# Patient Record
Sex: FEMALE | Race: BLACK OR AFRICAN AMERICAN | NOT HISPANIC OR LATINO | Employment: OTHER | URBAN - METROPOLITAN AREA
[De-identification: names, ages, dates, MRNs, and addresses within clinical notes are randomized per-mention and may not be internally consistent; named-entity substitution may affect disease eponyms.]

---

## 2017-01-17 ENCOUNTER — HISTORICAL (OUTPATIENT)
Dept: RADIOLOGY | Facility: HOSPITAL | Age: 62
End: 2017-01-17

## 2017-07-05 ENCOUNTER — HISTORICAL (OUTPATIENT)
Dept: ADMINISTRATIVE | Facility: HOSPITAL | Age: 62
End: 2017-07-05

## 2017-07-05 LAB
APPEARANCE, UA: CLEAR
BACTERIA #/AREA URNS AUTO: ABNORMAL /[HPF]
BILIRUB UR QL STRIP: NEGATIVE
COLOR UR: YELLOW
GLUCOSE (UA): NORMAL
HGB UR QL STRIP: NEGATIVE
HYALINE CASTS #/AREA URNS LPF: ABNORMAL /[LPF]
KETONES UR QL STRIP: NEGATIVE
LEUKOCYTE ESTERASE UR QL STRIP: 25 LEU/UL
NITRITE UR QL STRIP: NEGATIVE
PH UR STRIP: 5.5 [PH] (ref 4.5–8)
PROT UR QL STRIP: 20 MG/DL
RBC #/AREA URNS AUTO: ABNORMAL /[HPF]
SP GR UR STRIP: 1.01 (ref 1–1.03)
SQUAMOUS #/AREA URNS LPF: ABNORMAL /[LPF]
UROBILINOGEN UR STRIP-ACNC: NORMAL
WBC #/AREA URNS AUTO: ABNORMAL /HPF

## 2017-07-14 ENCOUNTER — HISTORICAL (OUTPATIENT)
Dept: ADMINISTRATIVE | Facility: HOSPITAL | Age: 62
End: 2017-07-14

## 2017-09-20 ENCOUNTER — HISTORICAL (OUTPATIENT)
Dept: ADMINISTRATIVE | Facility: HOSPITAL | Age: 62
End: 2017-09-20

## 2017-10-19 ENCOUNTER — HISTORICAL (OUTPATIENT)
Dept: ADMINISTRATIVE | Facility: HOSPITAL | Age: 62
End: 2017-10-19

## 2018-11-26 ENCOUNTER — HISTORICAL (OUTPATIENT)
Dept: RADIOLOGY | Facility: HOSPITAL | Age: 63
End: 2018-11-26

## 2018-12-06 ENCOUNTER — HISTORICAL (OUTPATIENT)
Dept: ADMINISTRATIVE | Facility: HOSPITAL | Age: 63
End: 2018-12-06

## 2019-01-09 ENCOUNTER — HISTORICAL (OUTPATIENT)
Dept: RADIOLOGY | Facility: HOSPITAL | Age: 64
End: 2019-01-09

## 2019-02-12 ENCOUNTER — HISTORICAL (OUTPATIENT)
Dept: ADMINISTRATIVE | Facility: HOSPITAL | Age: 64
End: 2019-02-12

## 2019-03-04 ENCOUNTER — HISTORICAL (OUTPATIENT)
Dept: ADMINISTRATIVE | Facility: HOSPITAL | Age: 64
End: 2019-03-04

## 2019-03-04 LAB
T3FREE SERPL-MCNC: 2.68 PG/ML (ref 2.18–3.98)
T4 FREE SERPL-MCNC: 1.23 NG/DL (ref 0.76–1.46)
TSH SERPL-ACNC: 0.82 MIU/L (ref 0.36–3.74)

## 2019-03-07 ENCOUNTER — HISTORICAL (OUTPATIENT)
Dept: ADMINISTRATIVE | Facility: HOSPITAL | Age: 64
End: 2019-03-07

## 2019-03-28 ENCOUNTER — HISTORICAL (OUTPATIENT)
Dept: LAB | Facility: HOSPITAL | Age: 64
End: 2019-03-28

## 2019-03-28 LAB
ALBUMIN SERPL-MCNC: 3.6 GM/DL (ref 3.4–5)
ALBUMIN/GLOB SERPL: 1 RATIO (ref 1.1–2)
ALP SERPL-CCNC: 96 UNIT/L (ref 45–117)
ALT SERPL-CCNC: 17 UNIT/L (ref 12–78)
AST SERPL-CCNC: 16 UNIT/L (ref 15–37)
BILIRUB SERPL-MCNC: 0.6 MG/DL (ref 0.2–1)
BILIRUBIN DIRECT+TOT PNL SERPL-MCNC: 0.2 MG/DL
BILIRUBIN DIRECT+TOT PNL SERPL-MCNC: 0.4 MG/DL
BUN SERPL-MCNC: 20 MG/DL (ref 7–18)
CALCIUM SERPL-MCNC: 8.9 MG/DL (ref 8.5–10.1)
CHLORIDE SERPL-SCNC: 110 MMOL/L (ref 98–107)
CHOLEST SERPL-MCNC: 176 MG/DL
CHOLEST/HDLC SERPL: 2.3 {RATIO} (ref 0–4.4)
CO2 SERPL-SCNC: 28 MMOL/L (ref 21–32)
CREAT SERPL-MCNC: 1.1 MG/DL (ref 0.6–1.3)
DEPRECATED CALCIDIOL+CALCIFEROL SERPL-MC: 11.18 NG/ML (ref 30–80)
ERYTHROCYTE [DISTWIDTH] IN BLOOD BY AUTOMATED COUNT: 12.2 % (ref 11.5–14.5)
FT4I SERPL CALC-MCNC: 4.78 (ref 2.6–3.6)
GLOBULIN SER-MCNC: 3.6 GM/ML (ref 2.3–3.5)
GLUCOSE SERPL-MCNC: 80 MG/DL (ref 74–106)
HCT VFR BLD AUTO: 38.3 % (ref 35–46)
HDLC SERPL-MCNC: 78 MG/DL
HGB BLD-MCNC: 12.7 GM/DL (ref 12–16)
LDLC SERPL CALC-MCNC: 78 MG/DL (ref 0–130)
MCH RBC QN AUTO: 29.3 PG (ref 26–34)
MCHC RBC AUTO-ENTMCNC: 33.2 GM/DL (ref 31–37)
MCV RBC AUTO: 88.5 FL (ref 80–100)
PLATELET # BLD AUTO: 177 X10(3)/MCL (ref 130–400)
PMV BLD AUTO: 10.4 FL (ref 7.4–10.4)
POTASSIUM SERPL-SCNC: 4.2 MMOL/L (ref 3.5–5.1)
PROT SERPL-MCNC: 7.2 GM/DL (ref 6.4–8.2)
RBC # BLD AUTO: 4.33 X10(6)/MCL (ref 4–5.2)
SODIUM SERPL-SCNC: 143 MMOL/L (ref 136–145)
T3RU NFR SERPL: 29 % (ref 31–39)
T4 SERPL-MCNC: 16.5 MCG/DL (ref 4.7–13.3)
TRIGL SERPL-MCNC: 102 MG/DL
TSH SERPL-ACNC: 0.43 MIU/L (ref 0.36–3.74)
VLDLC SERPL CALC-MCNC: 20 MG/DL
WBC # SPEC AUTO: 5.1 X10(3)/MCL (ref 4.5–11)

## 2019-07-09 ENCOUNTER — HISTORICAL (OUTPATIENT)
Dept: FAMILY MEDICINE | Facility: CLINIC | Age: 64
End: 2019-07-09

## 2019-07-09 LAB
ABS NEUT (OLG): 2.83 X10(3)/MCL (ref 2.1–9.2)
BASOPHILS # BLD AUTO: 0.05 X10(3)/MCL
BASOPHILS NFR BLD AUTO: 1 %
CHOLEST SERPL-MCNC: 175 MG/DL
CHOLEST/HDLC SERPL: 2.3 {RATIO} (ref 0–4.4)
DEPRECATED CALCIDIOL+CALCIFEROL SERPL-MC: 17.8 NG/ML (ref 30–80)
EOSINOPHIL # BLD AUTO: 0.26 X10(3)/MCL
EOSINOPHIL NFR BLD AUTO: 5 %
ERYTHROCYTE [DISTWIDTH] IN BLOOD BY AUTOMATED COUNT: 13 % (ref 11.5–14.5)
HCT VFR BLD AUTO: 37.6 % (ref 35–46)
HDLC SERPL-MCNC: 75 MG/DL
HGB BLD-MCNC: 12 GM/DL (ref 12–16)
IMM GRANULOCYTES # BLD AUTO: 0.01 10*3/UL
IMM GRANULOCYTES NFR BLD AUTO: 0 %
LDLC SERPL CALC-MCNC: 72 MG/DL (ref 0–130)
LYMPHOCYTES # BLD AUTO: 1.61 X10(3)/MCL
LYMPHOCYTES NFR BLD AUTO: 31 % (ref 13–40)
MCH RBC QN AUTO: 29 PG (ref 26–34)
MCHC RBC AUTO-ENTMCNC: 31.9 GM/DL (ref 31–37)
MCV RBC AUTO: 90.8 FL (ref 80–100)
MONOCYTES # BLD AUTO: 0.4 X10(3)/MCL
MONOCYTES NFR BLD AUTO: 8 % (ref 4–12)
NEUTROPHILS # BLD AUTO: 2.83 X10(3)/MCL
NEUTROPHILS NFR BLD AUTO: 55 X10(3)/MCL
PLATELET # BLD AUTO: 179 X10(3)/MCL (ref 130–400)
PMV BLD AUTO: 10.4 FL (ref 7.4–10.4)
RBC # BLD AUTO: 4.14 X10(6)/MCL (ref 4–5.2)
TRIGL SERPL-MCNC: 138 MG/DL
TSH SERPL-ACNC: 0.74 MIU/L (ref 0.36–3.74)
VLDLC SERPL CALC-MCNC: 28 MG/DL
WBC # SPEC AUTO: 5.2 X10(3)/MCL (ref 4.5–11)

## 2019-10-11 ENCOUNTER — HISTORICAL (OUTPATIENT)
Dept: ADMINISTRATIVE | Facility: HOSPITAL | Age: 64
End: 2019-10-11

## 2019-10-11 LAB
ABS NEUT (OLG): 3.56 X10(3)/MCL (ref 2.1–9.2)
ALBUMIN SERPL-MCNC: 3.7 GM/DL (ref 3.4–5)
ALBUMIN/GLOB SERPL: 1.1 RATIO (ref 1.1–2)
ALP SERPL-CCNC: 120 UNIT/L (ref 45–117)
ALT SERPL-CCNC: 23 UNIT/L (ref 12–78)
AST SERPL-CCNC: 20 UNIT/L (ref 15–37)
BASOPHILS # BLD AUTO: 0 X10(3)/MCL (ref 0–0.2)
BASOPHILS NFR BLD AUTO: 1 %
BILIRUB SERPL-MCNC: 0.5 MG/DL (ref 0.2–1)
BILIRUBIN DIRECT+TOT PNL SERPL-MCNC: 0.2 MG/DL (ref 0–0.2)
BILIRUBIN DIRECT+TOT PNL SERPL-MCNC: 0.3 MG/DL
BUN SERPL-MCNC: 17 MG/DL (ref 7–18)
CALCIUM SERPL-MCNC: 8.9 MG/DL (ref 8.5–10.1)
CHLORIDE SERPL-SCNC: 107 MMOL/L (ref 98–107)
CHOLEST SERPL-MCNC: 168 MG/DL
CHOLEST/HDLC SERPL: 2 {RATIO} (ref 0–4.4)
CO2 SERPL-SCNC: 31 MMOL/L (ref 21–32)
CREAT SERPL-MCNC: 1.1 MG/DL (ref 0.6–1.3)
DEPRECATED CALCIDIOL+CALCIFEROL SERPL-MC: 17 NG/ML (ref 30–80)
EOSINOPHIL # BLD AUTO: 0.3 X10(3)/MCL (ref 0–0.9)
EOSINOPHIL NFR BLD AUTO: 5 %
ERYTHROCYTE [DISTWIDTH] IN BLOOD BY AUTOMATED COUNT: 12 % (ref 11.5–14.5)
GLOBULIN SER-MCNC: 3.5 GM/ML (ref 2.3–3.5)
GLUCOSE SERPL-MCNC: 82 MG/DL (ref 74–106)
HCT VFR BLD AUTO: 39.3 % (ref 35–46)
HDLC SERPL-MCNC: 83 MG/DL (ref 40–59)
HGB BLD-MCNC: 12.7 GM/DL (ref 12–16)
IMM GRANULOCYTES # BLD AUTO: 0.01 10*3/UL
IMM GRANULOCYTES NFR BLD AUTO: 0 %
LDLC SERPL CALC-MCNC: 58 MG/DL
LYMPHOCYTES # BLD AUTO: 1.5 X10(3)/MCL (ref 0.6–4.6)
LYMPHOCYTES NFR BLD AUTO: 26 %
MCH RBC QN AUTO: 28.9 PG (ref 26–34)
MCHC RBC AUTO-ENTMCNC: 32.3 GM/DL (ref 31–37)
MCV RBC AUTO: 89.5 FL (ref 80–100)
MONOCYTES # BLD AUTO: 0.4 X10(3)/MCL (ref 0.1–1.3)
MONOCYTES NFR BLD AUTO: 7 %
NEUTROPHILS # BLD AUTO: 3.56 X10(3)/MCL (ref 2.1–9.2)
NEUTROPHILS NFR BLD AUTO: 62 %
PLATELET # BLD AUTO: 183 X10(3)/MCL (ref 130–400)
PMV BLD AUTO: 10.7 FL (ref 7.4–10.4)
POTASSIUM SERPL-SCNC: 4.6 MMOL/L (ref 3.5–5.1)
PROT SERPL-MCNC: 7.2 GM/DL (ref 6.4–8.2)
RBC # BLD AUTO: 4.39 X10(6)/MCL (ref 4–5.2)
SODIUM SERPL-SCNC: 143 MMOL/L (ref 136–145)
TRIGL SERPL-MCNC: 134 MG/DL
VLDLC SERPL CALC-MCNC: 27 MG/DL
WBC # SPEC AUTO: 5.8 X10(3)/MCL (ref 4.5–11)

## 2019-11-11 ENCOUNTER — HISTORICAL (OUTPATIENT)
Dept: RADIOLOGY | Facility: HOSPITAL | Age: 64
End: 2019-11-11

## 2020-06-09 ENCOUNTER — HISTORICAL (OUTPATIENT)
Dept: ADMINISTRATIVE | Facility: HOSPITAL | Age: 65
End: 2020-06-09

## 2020-06-09 LAB
ABS NEUT (OLG): 3.23 X10(3)/MCL (ref 2.1–9.2)
ALBUMIN SERPL-MCNC: 3.8 GM/DL (ref 3.4–5)
ALBUMIN/GLOB SERPL: 1 RATIO (ref 1.1–2)
ALP SERPL-CCNC: 118 UNIT/L (ref 45–117)
ALT SERPL-CCNC: 21 UNIT/L (ref 12–78)
AST SERPL-CCNC: 18 UNIT/L (ref 15–37)
BASOPHILS # BLD AUTO: 0 X10(3)/MCL (ref 0–0.2)
BASOPHILS NFR BLD AUTO: 1 %
BILIRUB SERPL-MCNC: 0.4 MG/DL (ref 0.2–1)
BILIRUBIN DIRECT+TOT PNL SERPL-MCNC: 0.2 MG/DL
BILIRUBIN DIRECT+TOT PNL SERPL-MCNC: 0.2 MG/DL (ref 0–0.2)
BUN SERPL-MCNC: 16 MG/DL (ref 7–18)
CALCIUM SERPL-MCNC: 9.1 MG/DL (ref 8.5–10.1)
CHLORIDE SERPL-SCNC: 107 MMOL/L (ref 98–107)
CHOLEST SERPL-MCNC: 164 MG/DL
CHOLEST/HDLC SERPL: 2.1 {RATIO} (ref 0–4.4)
CO2 SERPL-SCNC: 31 MMOL/L (ref 21–32)
CREAT SERPL-MCNC: 1.1 MG/DL (ref 0.6–1.3)
DEPRECATED CALCIDIOL+CALCIFEROL SERPL-MC: 14.7 NG/ML (ref 30–80)
EOSINOPHIL # BLD AUTO: 0.3 X10(3)/MCL (ref 0–0.9)
EOSINOPHIL NFR BLD AUTO: 5 %
ERYTHROCYTE [DISTWIDTH] IN BLOOD BY AUTOMATED COUNT: 12.6 % (ref 11.5–14.5)
GLOBULIN SER-MCNC: 3.9 GM/ML (ref 2.3–3.5)
GLUCOSE SERPL-MCNC: 82 MG/DL (ref 74–106)
HCT VFR BLD AUTO: 38.7 % (ref 35–46)
HDLC SERPL-MCNC: 77 MG/DL (ref 40–59)
HGB BLD-MCNC: 12.4 GM/DL (ref 12–16)
IMM GRANULOCYTES # BLD AUTO: 0.01 10*3/UL
IMM GRANULOCYTES NFR BLD AUTO: 0 %
LDLC SERPL CALC-MCNC: 63 MG/DL
LYMPHOCYTES # BLD AUTO: 1.7 X10(3)/MCL (ref 0.6–4.6)
LYMPHOCYTES NFR BLD AUTO: 30 %
MCH RBC QN AUTO: 29.1 PG (ref 26–34)
MCHC RBC AUTO-ENTMCNC: 32 GM/DL (ref 31–37)
MCV RBC AUTO: 90.8 FL (ref 80–100)
MONOCYTES # BLD AUTO: 0.4 X10(3)/MCL (ref 0.1–1.3)
MONOCYTES NFR BLD AUTO: 7 %
NEUTROPHILS # BLD AUTO: 3.23 X10(3)/MCL (ref 2.1–9.2)
NEUTROPHILS NFR BLD AUTO: 56 %
PLATELET # BLD AUTO: 200 X10(3)/MCL (ref 130–400)
PMV BLD AUTO: 11.2 FL (ref 7.4–10.4)
POTASSIUM SERPL-SCNC: 4.9 MMOL/L (ref 3.5–5.1)
PROT SERPL-MCNC: 7.7 GM/DL (ref 6.4–8.2)
RBC # BLD AUTO: 4.26 X10(6)/MCL (ref 4–5.2)
SODIUM SERPL-SCNC: 141 MMOL/L (ref 136–145)
TRIGL SERPL-MCNC: 120 MG/DL
VLDLC SERPL CALC-MCNC: 24 MG/DL
WBC # SPEC AUTO: 5.7 X10(3)/MCL (ref 4.5–11)

## 2020-07-21 ENCOUNTER — HISTORICAL (OUTPATIENT)
Dept: RADIOLOGY | Facility: HOSPITAL | Age: 65
End: 2020-07-21

## 2020-08-24 ENCOUNTER — HISTORICAL (OUTPATIENT)
Dept: RADIOLOGY | Facility: HOSPITAL | Age: 65
End: 2020-08-24

## 2020-09-01 ENCOUNTER — HISTORICAL (OUTPATIENT)
Dept: ADMINISTRATIVE | Facility: HOSPITAL | Age: 65
End: 2020-09-01

## 2020-09-01 LAB
APTT PPP: 29 SECOND(S) (ref 23.3–37)
INR PPP: 1 (ref 0.9–1.2)
PROTHROMBIN TIME: 12.8 SECOND(S) (ref 11.9–14.4)

## 2020-09-08 ENCOUNTER — HISTORICAL (OUTPATIENT)
Dept: ADMINISTRATIVE | Facility: HOSPITAL | Age: 65
End: 2020-09-08

## 2020-09-08 LAB
ABS NEUT (OLG): 2.83 X10(3)/MCL (ref 2.1–9.2)
ALBUMIN SERPL-MCNC: 3.7 GM/DL (ref 3.4–5)
ALBUMIN/GLOB SERPL: 0.9 RATIO (ref 1.1–2)
ALP SERPL-CCNC: 113 UNIT/L (ref 45–117)
ALT SERPL-CCNC: 22 UNIT/L (ref 12–78)
AST SERPL-CCNC: 19 UNIT/L (ref 15–37)
BASOPHILS # BLD AUTO: 0.1 X10(3)/MCL (ref 0–0.2)
BASOPHILS NFR BLD AUTO: 1 %
BILIRUB SERPL-MCNC: 0.6 MG/DL (ref 0.2–1)
BILIRUBIN DIRECT+TOT PNL SERPL-MCNC: 0.2 MG/DL (ref 0–0.2)
BILIRUBIN DIRECT+TOT PNL SERPL-MCNC: 0.4 MG/DL
BUN SERPL-MCNC: 14 MG/DL (ref 7–18)
CALCIUM SERPL-MCNC: 8.9 MG/DL (ref 8.5–10.1)
CHLORIDE SERPL-SCNC: 110 MMOL/L (ref 98–107)
CO2 SERPL-SCNC: 24 MMOL/L (ref 21–32)
CREAT SERPL-MCNC: 1.2 MG/DL (ref 0.6–1.3)
EOSINOPHIL # BLD AUTO: 0.5 X10(3)/MCL (ref 0–0.9)
EOSINOPHIL NFR BLD AUTO: 9 %
ERYTHROCYTE [DISTWIDTH] IN BLOOD BY AUTOMATED COUNT: 12.1 % (ref 11.5–14.5)
GLOBULIN SER-MCNC: 4.1 GM/ML (ref 2.3–3.5)
GLUCOSE SERPL-MCNC: 100 MG/DL (ref 74–106)
HCT VFR BLD AUTO: 39.5 % (ref 35–46)
HGB BLD-MCNC: 12.6 GM/DL (ref 12–16)
IMM GRANULOCYTES # BLD AUTO: 0.01 10*3/UL
IMM GRANULOCYTES NFR BLD AUTO: 0 %
LYMPHOCYTES # BLD AUTO: 1.5 X10(3)/MCL (ref 0.6–4.6)
LYMPHOCYTES NFR BLD AUTO: 28 %
MCH RBC QN AUTO: 28.6 PG (ref 26–34)
MCHC RBC AUTO-ENTMCNC: 31.9 GM/DL (ref 31–37)
MCV RBC AUTO: 89.6 FL (ref 80–100)
MONOCYTES # BLD AUTO: 0.3 X10(3)/MCL (ref 0.1–1.3)
MONOCYTES NFR BLD AUTO: 6 %
NEUTROPHILS # BLD AUTO: 2.83 X10(3)/MCL (ref 2.1–9.2)
NEUTROPHILS NFR BLD AUTO: 55 %
PLATELET # BLD AUTO: 178 X10(3)/MCL (ref 130–400)
PMV BLD AUTO: 10.7 FL (ref 7.4–10.4)
POTASSIUM SERPL-SCNC: 4.1 MMOL/L (ref 3.5–5.1)
PROT SERPL-MCNC: 7.8 GM/DL (ref 6.4–8.2)
RBC # BLD AUTO: 4.41 X10(6)/MCL (ref 4–5.2)
SODIUM SERPL-SCNC: 143 MMOL/L (ref 136–145)
T4 FREE SERPL-MCNC: 1.22 NG/DL (ref 0.76–1.46)
TSH SERPL-ACNC: 1.29 MIU/L (ref 0.36–3.74)
WBC # SPEC AUTO: 5.1 X10(3)/MCL (ref 4.5–11)

## 2020-10-15 ENCOUNTER — HISTORICAL (OUTPATIENT)
Dept: RADIOLOGY | Facility: HOSPITAL | Age: 65
End: 2020-10-15

## 2020-10-15 LAB
ABS NEUT (OLG): 2.93 X10(3)/MCL (ref 2.1–9.2)
ALBUMIN SERPL-MCNC: 3.6 GM/DL (ref 3.4–5)
ALBUMIN/GLOB SERPL: 1 RATIO (ref 1.1–2)
ALP SERPL-CCNC: 128 UNIT/L (ref 45–117)
ALT SERPL-CCNC: 20 UNIT/L (ref 12–78)
AST SERPL-CCNC: 14 UNIT/L (ref 15–37)
BASOPHILS # BLD AUTO: 0 X10(3)/MCL (ref 0–0.2)
BASOPHILS NFR BLD AUTO: 1 %
BILIRUB SERPL-MCNC: 0.5 MG/DL (ref 0.2–1)
BILIRUBIN DIRECT+TOT PNL SERPL-MCNC: 0.2 MG/DL (ref 0–0.2)
BILIRUBIN DIRECT+TOT PNL SERPL-MCNC: 0.3 MG/DL
BUN SERPL-MCNC: 26 MG/DL (ref 7–18)
CALCIUM SERPL-MCNC: 8.8 MG/DL (ref 8.5–10.1)
CHLORIDE SERPL-SCNC: 109 MMOL/L (ref 98–107)
CO2 SERPL-SCNC: 28 MMOL/L (ref 21–32)
CREAT SERPL-MCNC: 1.1 MG/DL (ref 0.6–1.3)
EOSINOPHIL # BLD AUTO: 0.4 X10(3)/MCL (ref 0–0.9)
EOSINOPHIL NFR BLD AUTO: 7 %
ERYTHROCYTE [DISTWIDTH] IN BLOOD BY AUTOMATED COUNT: 12.6 % (ref 11.5–14.5)
FERRITIN SERPL-MCNC: 103.7 NG/ML (ref 8–388)
GLOBULIN SER-MCNC: 3.7 GM/ML (ref 2.3–3.5)
GLUCOSE SERPL-MCNC: 87 MG/DL (ref 74–106)
HAV AB SER QL IA: NONREACTIVE
HAV IGM SERPL QL IA: NONREACTIVE
HBV CORE AB SERPL QL IA: NONREACTIVE
HBV SURFACE AB SER-ACNC: 0 M[IU]/ML
HBV SURFACE AB SERPL IA-ACNC: NONREACTIVE M[IU]/ML
HBV SURFACE AG SERPL QL IA: NONREACTIVE
HCT VFR BLD AUTO: 39.1 % (ref 35–46)
HGB BLD-MCNC: 12.4 GM/DL (ref 12–16)
HIV 1+2 AB+HIV1 P24 AG SERPL QL IA: NONREACTIVE
IMM GRANULOCYTES # BLD AUTO: 0.01 10*3/UL
IMM GRANULOCYTES NFR BLD AUTO: 0 %
INR PPP: 0.97 (ref 0.9–1.2)
LYMPHOCYTES # BLD AUTO: 1.5 X10(3)/MCL (ref 0.6–4.6)
LYMPHOCYTES NFR BLD AUTO: 29 %
MCH RBC QN AUTO: 28.6 PG (ref 26–34)
MCHC RBC AUTO-ENTMCNC: 31.7 GM/DL (ref 31–37)
MCV RBC AUTO: 90.1 FL (ref 80–100)
MONOCYTES # BLD AUTO: 0.4 X10(3)/MCL (ref 0.1–1.3)
MONOCYTES NFR BLD AUTO: 7 %
NEUTROPHILS # BLD AUTO: 2.93 X10(3)/MCL (ref 2.1–9.2)
NEUTROPHILS NFR BLD AUTO: 56 %
PLATELET # BLD AUTO: 167 X10(3)/MCL (ref 130–400)
PMV BLD AUTO: 10.8 FL (ref 7.4–10.4)
POTASSIUM SERPL-SCNC: 4.5 MMOL/L (ref 3.5–5.1)
PROT SERPL-MCNC: 7.3 GM/DL (ref 6.4–8.2)
PROTHROMBIN TIME: 12.5 SECOND(S) (ref 11.9–14.4)
RBC # BLD AUTO: 4.34 X10(6)/MCL (ref 4–5.2)
SODIUM SERPL-SCNC: 143 MMOL/L (ref 136–145)
T PALLIDUM AB SER QL: NONREACTIVE
WBC # SPEC AUTO: 5.2 X10(3)/MCL (ref 4.5–11)

## 2020-10-27 ENCOUNTER — HISTORICAL (OUTPATIENT)
Dept: ADMINISTRATIVE | Facility: HOSPITAL | Age: 65
End: 2020-10-27

## 2020-10-27 LAB
CRP SERPL-MCNC: <0.1 MG/DL
ERYTHROCYTE [SEDIMENTATION RATE] IN BLOOD: 6 MM/HR (ref 0–20)
RHEUMATOID FACT SERPL-ACNC: <13 IU/ML
SSA(RO) ANTIBODY (OHS): NEGATIVE

## 2021-01-05 ENCOUNTER — HISTORICAL (OUTPATIENT)
Dept: ADMINISTRATIVE | Facility: HOSPITAL | Age: 66
End: 2021-01-05

## 2021-01-05 LAB
ABS NEUT (OLG): 3.19 X10(3)/MCL (ref 2.1–9.2)
ALBUMIN SERPL-MCNC: 4.2 GM/DL (ref 3.4–4.8)
ALBUMIN/GLOB SERPL: 1.2 RATIO (ref 1.1–2)
ALP SERPL-CCNC: 80 UNIT/L (ref 40–150)
ALT SERPL-CCNC: 11 UNIT/L (ref 0–55)
AST SERPL-CCNC: 14 UNIT/L (ref 5–34)
BASOPHILS # BLD AUTO: 0.1 X10(3)/MCL (ref 0–0.2)
BASOPHILS NFR BLD AUTO: 1 %
BILIRUB SERPL-MCNC: 0.7 MG/DL
BILIRUBIN DIRECT+TOT PNL SERPL-MCNC: 0.3 MG/DL (ref 0–0.5)
BILIRUBIN DIRECT+TOT PNL SERPL-MCNC: 0.4 MG/DL (ref 0–0.8)
BUN SERPL-MCNC: 20 MG/DL (ref 9.8–20.1)
CALCIUM SERPL-MCNC: 9.2 MG/DL (ref 8.4–10.2)
CHLORIDE SERPL-SCNC: 106 MMOL/L (ref 98–107)
CO2 SERPL-SCNC: 27 MMOL/L (ref 23–31)
CREAT SERPL-MCNC: 1.48 MG/DL (ref 0.55–1.02)
EOSINOPHIL # BLD AUTO: 0.4 X10(3)/MCL (ref 0–0.9)
EOSINOPHIL NFR BLD AUTO: 6 %
ERYTHROCYTE [DISTWIDTH] IN BLOOD BY AUTOMATED COUNT: 12.3 % (ref 11.5–14.5)
GLOBULIN SER-MCNC: 3.5 GM/DL (ref 2.4–3.5)
GLUCOSE SERPL-MCNC: 107 MG/DL (ref 82–115)
HCT VFR BLD AUTO: 38.7 % (ref 35–46)
HGB BLD-MCNC: 12.9 GM/DL (ref 12–16)
IMM GRANULOCYTES # BLD AUTO: 0.02 10*3/UL
IMM GRANULOCYTES NFR BLD AUTO: 0 %
LYMPHOCYTES # BLD AUTO: 2.1 X10(3)/MCL (ref 0.6–4.6)
LYMPHOCYTES NFR BLD AUTO: 34 %
MCH RBC QN AUTO: 29.6 PG (ref 26–34)
MCHC RBC AUTO-ENTMCNC: 33.3 GM/DL (ref 31–37)
MCV RBC AUTO: 88.8 FL (ref 80–100)
MONOCYTES # BLD AUTO: 0.4 X10(3)/MCL (ref 0.1–1.3)
MONOCYTES NFR BLD AUTO: 6 %
NEUTROPHILS # BLD AUTO: 3.19 X10(3)/MCL (ref 2.1–9.2)
NEUTROPHILS NFR BLD AUTO: 52 %
PLATELET # BLD AUTO: 180 X10(3)/MCL (ref 130–400)
PMV BLD AUTO: 10.4 FL (ref 7.4–10.4)
POTASSIUM SERPL-SCNC: 5.1 MMOL/L (ref 3.5–5.1)
PROT SERPL-MCNC: 7.7 GM/DL (ref 5.8–7.6)
RBC # BLD AUTO: 4.36 X10(6)/MCL (ref 4–5.2)
SODIUM SERPL-SCNC: 142 MMOL/L (ref 136–145)
WBC # SPEC AUTO: 6.2 X10(3)/MCL (ref 4.5–11)

## 2021-01-19 ENCOUNTER — HISTORICAL (OUTPATIENT)
Dept: FAMILY MEDICINE | Facility: CLINIC | Age: 66
End: 2021-01-19

## 2021-01-19 LAB
ABS NEUT (OLG): 2.97 X10(3)/MCL (ref 2.1–9.2)
ALBUMIN SERPL-MCNC: 4 GM/DL (ref 3.4–4.8)
ALBUMIN/GLOB SERPL: 1.2 RATIO (ref 1.1–2)
ALP SERPL-CCNC: 68 UNIT/L (ref 40–150)
ALT SERPL-CCNC: 9 UNIT/L (ref 0–55)
AST SERPL-CCNC: 14 UNIT/L (ref 5–34)
BASOPHILS # BLD AUTO: 0 X10(3)/MCL (ref 0–0.2)
BASOPHILS NFR BLD AUTO: 1 %
BILIRUB SERPL-MCNC: 0.6 MG/DL
BILIRUBIN DIRECT+TOT PNL SERPL-MCNC: 0.2 MG/DL (ref 0–0.5)
BILIRUBIN DIRECT+TOT PNL SERPL-MCNC: 0.4 MG/DL (ref 0–0.8)
BUN SERPL-MCNC: 12 MG/DL (ref 9.8–20.1)
CALCIUM SERPL-MCNC: 9.1 MG/DL (ref 8.4–10.2)
CHLORIDE SERPL-SCNC: 107 MMOL/L (ref 98–107)
CHOLEST SERPL-MCNC: 167 MG/DL
CHOLEST/HDLC SERPL: 4 {RATIO} (ref 0–5)
CO2 SERPL-SCNC: 25 MMOL/L (ref 23–31)
CREAT SERPL-MCNC: 1.2 MG/DL (ref 0.55–1.02)
EOSINOPHIL # BLD AUTO: 0.4 X10(3)/MCL (ref 0–0.9)
EOSINOPHIL NFR BLD AUTO: 6 %
ERYTHROCYTE [DISTWIDTH] IN BLOOD BY AUTOMATED COUNT: 12.1 % (ref 11.5–14.5)
EST. AVERAGE GLUCOSE BLD GHB EST-MCNC: 119.8 MG/DL
GLOBULIN SER-MCNC: 3.2 GM/DL (ref 2.4–3.5)
GLUCOSE SERPL-MCNC: 92 MG/DL (ref 82–115)
HBA1C MFR BLD: 5.8 %
HCT VFR BLD AUTO: 36.8 % (ref 35–46)
HDLC SERPL-MCNC: 42 MG/DL (ref 35–60)
HGB BLD-MCNC: 12 GM/DL (ref 12–16)
LDLC SERPL CALC-MCNC: 98 MG/DL (ref 50–140)
LYMPHOCYTES # BLD AUTO: 1.9 X10(3)/MCL (ref 0.6–4.6)
LYMPHOCYTES NFR BLD AUTO: 34 %
MCH RBC QN AUTO: 28.9 PG (ref 26–34)
MCHC RBC AUTO-ENTMCNC: 32.6 GM/DL (ref 31–37)
MCV RBC AUTO: 88.7 FL (ref 80–100)
MONOCYTES # BLD AUTO: 0.3 X10(3)/MCL (ref 0.1–1.3)
MONOCYTES NFR BLD AUTO: 6 %
NEUTROPHILS # BLD AUTO: 2.97 X10(3)/MCL (ref 2.1–9.2)
NEUTROPHILS NFR BLD AUTO: 53 %
PLATELET # BLD AUTO: 156 X10(3)/MCL (ref 130–400)
PMV BLD AUTO: 11 FL (ref 7.4–10.4)
POTASSIUM SERPL-SCNC: 4.6 MMOL/L (ref 3.5–5.1)
PROT SERPL-MCNC: 7.2 GM/DL (ref 5.8–7.6)
RBC # BLD AUTO: 4.15 X10(6)/MCL (ref 4–5.2)
SODIUM SERPL-SCNC: 142 MMOL/L (ref 136–145)
T4 FREE SERPL-MCNC: 0.97 NG/DL (ref 0.7–1.48)
TRIGL SERPL-MCNC: 137 MG/DL (ref 37–140)
TSH SERPL-ACNC: 0.94 UIU/ML (ref 0.35–4.94)
VLDLC SERPL CALC-MCNC: 27 MG/DL
WBC # SPEC AUTO: 5.6 X10(3)/MCL (ref 4.5–11)

## 2021-02-02 ENCOUNTER — HISTORICAL (OUTPATIENT)
Dept: ADMINISTRATIVE | Facility: HOSPITAL | Age: 66
End: 2021-02-02

## 2021-03-03 ENCOUNTER — HISTORICAL (OUTPATIENT)
Dept: ADMINISTRATIVE | Facility: HOSPITAL | Age: 66
End: 2021-03-03

## 2021-03-03 LAB
ABS NEUT (OLG): 2.88 X10(3)/MCL (ref 2.1–9.2)
ALBUMIN SERPL-MCNC: 3.9 GM/DL (ref 3.4–4.8)
ALBUMIN/GLOB SERPL: 1.3 RATIO (ref 1.1–2)
ALP SERPL-CCNC: 80 UNIT/L (ref 40–150)
ALT SERPL-CCNC: 8 UNIT/L (ref 0–55)
AST SERPL-CCNC: 13 UNIT/L (ref 5–34)
BASOPHILS # BLD AUTO: 0 X10(3)/MCL (ref 0–0.2)
BASOPHILS NFR BLD AUTO: 1 %
BILIRUB SERPL-MCNC: 1.1 MG/DL
BILIRUBIN DIRECT+TOT PNL SERPL-MCNC: 0.3 MG/DL (ref 0–0.5)
BILIRUBIN DIRECT+TOT PNL SERPL-MCNC: 0.8 MG/DL (ref 0–0.8)
BUN SERPL-MCNC: 21 MG/DL (ref 9.8–20.1)
CALCIUM SERPL-MCNC: 9 MG/DL (ref 8.4–10.2)
CHLORIDE SERPL-SCNC: 105 MMOL/L (ref 98–107)
CO2 SERPL-SCNC: 25 MMOL/L (ref 23–31)
CREAT SERPL-MCNC: 1.11 MG/DL (ref 0.55–1.02)
EOSINOPHIL # BLD AUTO: 0.3 X10(3)/MCL (ref 0–0.9)
EOSINOPHIL NFR BLD AUTO: 5 %
ERYTHROCYTE [DISTWIDTH] IN BLOOD BY AUTOMATED COUNT: 13 % (ref 11.5–14.5)
GLOBULIN SER-MCNC: 3 GM/DL (ref 2.4–3.5)
GLUCOSE SERPL-MCNC: 80 MG/DL (ref 82–115)
HCT VFR BLD AUTO: 36.4 % (ref 35–46)
HGB BLD-MCNC: 11.8 GM/DL (ref 12–16)
IMM GRANULOCYTES # BLD AUTO: 0.01 10*3/UL
IMM GRANULOCYTES NFR BLD AUTO: 0 %
LYMPHOCYTES # BLD AUTO: 1.7 X10(3)/MCL (ref 0.6–4.6)
LYMPHOCYTES NFR BLD AUTO: 32 %
MCH RBC QN AUTO: 28.8 PG (ref 26–34)
MCHC RBC AUTO-ENTMCNC: 32.4 GM/DL (ref 31–37)
MCV RBC AUTO: 88.8 FL (ref 80–100)
MONOCYTES # BLD AUTO: 0.4 X10(3)/MCL (ref 0.1–1.3)
MONOCYTES NFR BLD AUTO: 7 %
NEUTROPHILS # BLD AUTO: 2.88 X10(3)/MCL (ref 2.1–9.2)
NEUTROPHILS NFR BLD AUTO: 55 %
PLATELET # BLD AUTO: 171 X10(3)/MCL (ref 130–400)
PMV BLD AUTO: 10.2 FL (ref 7.4–10.4)
POTASSIUM SERPL-SCNC: 5 MMOL/L (ref 3.5–5.1)
PROT SERPL-MCNC: 6.9 GM/DL (ref 5.8–7.6)
RBC # BLD AUTO: 4.1 X10(6)/MCL (ref 4–5.2)
SODIUM SERPL-SCNC: 140 MMOL/L (ref 136–145)
WBC # SPEC AUTO: 5.3 X10(3)/MCL (ref 4.5–11)

## 2021-03-16 ENCOUNTER — HISTORICAL (OUTPATIENT)
Dept: ADMINISTRATIVE | Facility: HOSPITAL | Age: 66
End: 2021-03-16

## 2021-03-30 ENCOUNTER — HISTORICAL (OUTPATIENT)
Dept: ADMINISTRATIVE | Facility: HOSPITAL | Age: 66
End: 2021-03-30

## 2021-04-12 ENCOUNTER — HISTORICAL (OUTPATIENT)
Dept: RADIOLOGY | Facility: HOSPITAL | Age: 66
End: 2021-04-12

## 2021-06-10 ENCOUNTER — HISTORICAL (OUTPATIENT)
Dept: ADMINISTRATIVE | Facility: HOSPITAL | Age: 66
End: 2021-06-10

## 2021-10-27 ENCOUNTER — HISTORICAL (OUTPATIENT)
Dept: FAMILY MEDICINE | Facility: CLINIC | Age: 66
End: 2021-10-27

## 2021-10-27 LAB
ABS NEUT (OLG): 3.33 X10(3)/MCL (ref 2.1–9.2)
ALBUMIN SERPL-MCNC: 3.9 GM/DL (ref 3.4–4.8)
ALBUMIN/GLOB SERPL: 1.2 RATIO (ref 1.1–2)
ALP SERPL-CCNC: 85 UNIT/L (ref 40–150)
ALT SERPL-CCNC: 7 UNIT/L (ref 0–55)
AST SERPL-CCNC: 11 UNIT/L (ref 5–34)
BASOPHILS # BLD AUTO: 0 X10(3)/MCL (ref 0–0.2)
BASOPHILS NFR BLD AUTO: 1 %
BILIRUB SERPL-MCNC: 0.7 MG/DL
BILIRUBIN DIRECT+TOT PNL SERPL-MCNC: 0.2 MG/DL (ref 0–0.5)
BILIRUBIN DIRECT+TOT PNL SERPL-MCNC: 0.5 MG/DL (ref 0–0.8)
BUN SERPL-MCNC: 12 MG/DL (ref 9.8–20.1)
CALCIUM SERPL-MCNC: 9.5 MG/DL (ref 8.7–10.5)
CHLORIDE SERPL-SCNC: 110 MMOL/L (ref 98–107)
CHOLEST SERPL-MCNC: 200 MG/DL
CHOLEST/HDLC SERPL: 4 {RATIO} (ref 0–5)
CO2 SERPL-SCNC: 28 MMOL/L (ref 23–31)
CREAT SERPL-MCNC: 1.08 MG/DL (ref 0.55–1.02)
EOSINOPHIL # BLD AUTO: 0.2 X10(3)/MCL (ref 0–0.9)
EOSINOPHIL NFR BLD AUTO: 4 %
ERYTHROCYTE [DISTWIDTH] IN BLOOD BY AUTOMATED COUNT: 12.8 % (ref 11.5–14.5)
GLOBULIN SER-MCNC: 3.2 GM/DL (ref 2.4–3.5)
GLUCOSE SERPL-MCNC: 88 MG/DL (ref 82–115)
HCT VFR BLD AUTO: 34.6 % (ref 35–46)
HDLC SERPL-MCNC: 45 MG/DL (ref 35–60)
HGB BLD-MCNC: 11.5 GM/DL (ref 12–16)
IMM GRANULOCYTES # BLD AUTO: 0.01 10*3/UL
IMM GRANULOCYTES NFR BLD AUTO: 0 %
LDLC SERPL CALC-MCNC: 130 MG/DL (ref 50–140)
LYMPHOCYTES # BLD AUTO: 1.6 X10(3)/MCL (ref 0.6–4.6)
LYMPHOCYTES NFR BLD AUTO: 29 %
MCH RBC QN AUTO: 28.9 PG (ref 26–34)
MCHC RBC AUTO-ENTMCNC: 33.2 GM/DL (ref 31–37)
MCV RBC AUTO: 86.9 FL (ref 80–100)
MONOCYTES # BLD AUTO: 0.4 X10(3)/MCL (ref 0.1–1.3)
MONOCYTES NFR BLD AUTO: 6 %
NEUTROPHILS # BLD AUTO: 3.33 X10(3)/MCL (ref 2.1–9.2)
NEUTROPHILS NFR BLD AUTO: 59 %
NRBC BLD AUTO-RTO: 0 % (ref 0–0.2)
PLATELET # BLD AUTO: 178 X10(3)/MCL (ref 130–400)
PMV BLD AUTO: 10.7 FL (ref 7.4–10.4)
POTASSIUM SERPL-SCNC: 4.1 MMOL/L (ref 3.5–5.1)
PROT SERPL-MCNC: 7.1 GM/DL (ref 5.8–7.6)
RBC # BLD AUTO: 3.98 X10(6)/MCL (ref 4–5.2)
SODIUM SERPL-SCNC: 143 MMOL/L (ref 136–145)
T4 FREE SERPL-MCNC: 0.99 NG/DL (ref 0.7–1.48)
TRIGL SERPL-MCNC: 127 MG/DL (ref 37–140)
TSH SERPL-ACNC: 0.78 UIU/ML (ref 0.35–4.94)
VLDLC SERPL CALC-MCNC: 25 MG/DL
WBC # SPEC AUTO: 5.6 X10(3)/MCL (ref 4.5–11)

## 2022-01-31 ENCOUNTER — HISTORICAL (OUTPATIENT)
Dept: ADMINISTRATIVE | Facility: HOSPITAL | Age: 67
End: 2022-01-31

## 2022-01-31 ENCOUNTER — HISTORICAL (OUTPATIENT)
Dept: RADIOLOGY | Facility: HOSPITAL | Age: 67
End: 2022-01-31

## 2022-04-10 ENCOUNTER — HISTORICAL (OUTPATIENT)
Dept: ADMINISTRATIVE | Facility: HOSPITAL | Age: 67
End: 2022-04-10
Payer: MEDICARE

## 2022-04-19 ENCOUNTER — HISTORICAL (OUTPATIENT)
Dept: FAMILY MEDICINE | Facility: CLINIC | Age: 67
End: 2022-04-19
Payer: MEDICARE

## 2022-04-19 LAB
ABS NEUT (OLG): 3.05 (ref 2.1–9.2)
ALBUMIN SERPL-MCNC: 4 G/DL (ref 3.4–4.8)
ALBUMIN/GLOB SERPL: 1.1 {RATIO} (ref 1.1–2)
ALP SERPL-CCNC: 100 U/L (ref 40–150)
ALT SERPL-CCNC: 8 U/L (ref 0–55)
APPEARANCE, UA: CLEAR
AST SERPL-CCNC: 12 U/L (ref 5–34)
BACTERIA SPEC CULT: NORMAL
BASOPHILS # BLD AUTO: 0.1 10*3/UL (ref 0–0.2)
BASOPHILS NFR BLD AUTO: 1 %
BILIRUB SERPL-MCNC: 0.6 MG/DL
BILIRUB UR QL STRIP: NEGATIVE
BILIRUBIN DIRECT+TOT PNL SERPL-MCNC: 0.3 (ref 0–0.5)
BILIRUBIN DIRECT+TOT PNL SERPL-MCNC: 0.3 (ref 0–0.8)
BUN SERPL-MCNC: 13.4 MG/DL (ref 9.8–20.1)
CALCIUM SERPL-MCNC: 9.4 MG/DL (ref 8.7–10.5)
CHLORIDE SERPL-SCNC: 107 MMOL/L (ref 98–107)
CHOLEST SERPL-MCNC: 175 MG/DL
CHOLEST/HDLC SERPL: 4 {RATIO} (ref 0–5)
CO2 SERPL-SCNC: 27 MMOL/L (ref 23–31)
COLOR UR: NORMAL
CREAT SERPL-MCNC: 1.01 MG/DL (ref 0.55–1.02)
EOSINOPHIL # BLD AUTO: 0.3 10*3/UL (ref 0–0.9)
EOSINOPHIL NFR BLD AUTO: 5 %
ERYTHROCYTE [DISTWIDTH] IN BLOOD BY AUTOMATED COUNT: 13.1 % (ref 11.5–14.5)
EST. AVERAGE GLUCOSE BLD GHB EST-MCNC: 114 MG/DL
FLAG2 (OHS): 60
FLAG3 (OHS): 80
FLAGS (OHS): 80
GLOBULIN SER-MCNC: 3.5 G/DL (ref 2.4–3.5)
GLUCOSE (UA): NORMAL
GLUCOSE SERPL-MCNC: 78 MG/DL (ref 82–115)
HBA1C MFR BLD: 5.6 %
HCT VFR BLD AUTO: 35.6 % (ref 35–46)
HDLC SERPL-MCNC: 49 MG/DL (ref 35–60)
HEMOLYSIS INTERF INDEX SERPL-ACNC: 1
HGB BLD-MCNC: 11.3 G/DL (ref 12–16)
HGB UR QL STRIP: NORMAL
HYALINE CASTS #/AREA URNS LPF: NORMAL /[LPF]
ICTERIC INTERF INDEX SERPL-ACNC: 1
IMM GRANULOCYTES # BLD AUTO: 0.01 10*3/UL
IMM GRANULOCYTES NFR BLD AUTO: 0 %
IMM. NE 2 SUSPECT FLAG (OHS): 10
KETONES UR QL STRIP: NEGATIVE
LDLC SERPL CALC-MCNC: 103 MG/DL (ref 50–140)
LEUKOCYTE ESTERASE UR QL STRIP: NEGATIVE
LIPEMIC INTERF INDEX SERPL-ACNC: 2
LOW EVENT # SUSPECT FLAG (OHS): 80
LYMPHOCYTES # BLD AUTO: 1.8 10*3/UL (ref 0.6–4.6)
LYMPHOCYTES NFR BLD AUTO: 33 %
MANUAL DIFF? (OHS): NO
MCH RBC QN AUTO: 27.8 PG (ref 26–34)
MCHC RBC AUTO-ENTMCNC: 31.7 G/DL (ref 31–37)
MCV RBC AUTO: 87.7 FL (ref 80–100)
MO BLASTS SUSPECT FLAG (OHS): 40
MONOCYTES # BLD AUTO: 0.3 10*3/UL (ref 0.1–1.3)
MONOCYTES NFR BLD AUTO: 6 %
NEUTROPHILS # BLD AUTO: 3.05 10*3/UL (ref 2.1–9.2)
NEUTROPHILS NFR BLD AUTO: 55 %
NITRITE UR QL STRIP: NEGATIVE
NRBC BLD AUTO-RTO: 0 % (ref 0–0.2)
PH UR STRIP: 5.5 [PH] (ref 4.5–8)
PLATELET # BLD AUTO: 175 10*3/UL (ref 130–400)
PLATELET CLUMPS SUSPECT FLAG (OHS): 10
PMV BLD AUTO: 11.7 FL (ref 7.4–10.4)
POTASSIUM SERPL-SCNC: 4.7 MMOL/L (ref 3.5–5.1)
PROT SERPL-MCNC: 7.5 G/DL (ref 5.8–7.6)
PROT UR QL STRIP: NORMAL
RBC # BLD AUTO: 4.06 10*6/UL (ref 4–5.2)
RBC #/AREA URNS HPF: NORMAL /[HPF] (ref 0–5)
SODIUM SERPL-SCNC: 143 MMOL/L (ref 136–145)
SP GR UR STRIP: 1.02 (ref 1–1.03)
SQUAMOUS EPITHELIAL, UA: NORMAL
TRIGL SERPL-MCNC: 113 MG/DL (ref 37–140)
TSH SERPL-ACNC: 1.27 M[IU]/L (ref 0.35–4.94)
UROBILINOGEN UR STRIP-ACNC: NORMAL
VLDLC SERPL CALC-MCNC: 23 MG/DL
WBC # SPEC AUTO: 5.6 10*3/UL (ref 4.5–11)
WBC #/AREA URNS HPF: NORMAL /[HPF] (ref 0–5)

## 2022-04-26 VITALS
HEIGHT: 71 IN | BODY MASS INDEX: 38.98 KG/M2 | OXYGEN SATURATION: 100 % | WEIGHT: 278.44 LBS | SYSTOLIC BLOOD PRESSURE: 133 MMHG | DIASTOLIC BLOOD PRESSURE: 62 MMHG

## 2022-04-30 NOTE — PROGRESS NOTES
MNT OUTPATIENT EDUCATION   Nutrition Diagnosis  Problem:   Food & Nutrition Related Knowledge Deficit       Related to:  Medical Diagnosis  As Evidenced by:  Limited prior knowledge / health professional referral    Nutrition Prescription / Intervention  MNT Education Completed on:    Healthy Eating:  Instructed patient on heart healthy eating and weight management; low fat, cholesterol, and sodium foods; better food choices; portion sizes; healthy choices when cooking and / or eating out; reading food labels; increasing activity.       Level of Understanding:   good  Expected Compliance:   good  Appropriate Handouts Given: (age specific / literacy): yes  Barriers to Learning: none    Nutrition Prescription:  Diet order prescribed per MD / RD    Goal:  Patient will adhere to prescribed MNT meal plan as instructed by RD    Monitoring / Evaluation    R.DRomeo will monitor: MOE

## 2022-05-02 NOTE — HISTORICAL [facility] CERNER
This is a historical note converted from Rocco. Formatting and pictures may have been removed.  Please reference Rocco for original formatting and attached multimedia. 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 Reviewed and discussed?labs from?9/1/2020 with the patient  ?   Patient states that?he?is adhering to a?low fat and ?low sodium and medications.  ?   HTN/CAD: Pt adheres to low sodium eating habit. Currently on Coreg 12.5 mg po BID and Entresto 24mg/26 mg po daily. Pt denies HA, chest pain, dizziness, SOB, MEYER. ?She states the cardiologist is trying to increase her Entresto?to taking 2 pills daily. ?Patients blood pressure has been consistently stable?within the clinic with Entresto taking 1 tab p.o. daily.? Patient has?hesitant to take the increased dose. ?Patient?requested that?[facility] to contact??[facility] office?in regards to the increase in the Entresto.  ?   Hepatitis C:?Noticed Hep C AB reactive with RNA 2.9 million. Reviewed with pt hepatitis C genotype (9/1/2020)?was 1 a within normal PT/PTT and INR.discussed with patient who was not aware of her Hep C status. She has no risky behavior-no IVDA, no tattoos, no illicit drug use, and no risk sexual behavior. Did have a blood transfusion in 1970s and 1986. with hysterectomy. ?Reviewed liver US on 10/15/2020?was unremarkable. Reviewed ID? note 10/23/2020 stating patient is a candidate for hepatitis C treatment and?hepatitis C?pretreatment work-up detected a positive SRUTHI?which?ID wants?PCP?to follow-up. ?Pt hesitant to start treatment because of concerns about affects of medication on renal function.  ?   CKD: ( 10/15/2020): GFR 65 and BUN/Cr 26.1.10.  ?   Polyarthropathy/ +SRUTHI: Pain in  hands in b/l hands, neck and right foot intermittently. Pt not taking anything for pain. Stabbing pain from lower left buttock to her lower leg. ?Discussed with patient findings from?10/15/2020?pretreatment work-up by?[facility]?ID?in which a positive SRUTHI was found.? Discussed with the patient?that?the SRUTHI was positive?with a speckled 1:160?ratio and with her issues of polyarthropathy,?this should be followed up?to determine?whether or not the patient has an autoimmune arthritis.? Patient verbally expressed understanding and agreement with the?work-up.  ?   Right ear?tinnitus:?Patient has?a referral to? [facility] audiology from previous visit.? Patient states that she removed?cerumen from her right ear?and thinks she may have increased cerumen production.? Patient denies hearing loss or?pain. ?Patient was advised to follow-up with audiology?in regards to an appointment date.  ?   Patient denies chest pain, shortness of breath,?dyspnea on exertion, palpitations, peripheral edema,?abdominal pain, nausea, vomiting,?diarrhea,?constipation, fatigue, fever, chills,?dysuria,? hematuria, melena,?or hematochezia.  Review of Systems  GENERAL:?Negative for abnormal weight loss or weight gain, fatigue, fever, or chills. Negative except for stated in HPI.  HEENT:??Negative for head trauma,?blurred vision, rhinorrhea, ?nasal congestion, sore throat or hearing deficit. Negative except for stated in HPI.  CARDIOVASCULAR: Negative for? chest pain, palpitations, MEYER, PND, orthopnea, peripheral edema or syncope. Negative except for stated in HPI.  RESPIRATORY: Negative for SOB, MEYER, cough or?wheezing.? Negative except for stated in HPI.  GASTROINTESTINAL: Negative for abdominal pain, nausea,??vomiting, diarrhea, constipation, heartburn, ?hematochezia? or melena.? Negative except for stated in HPI.  GENITOURINARY: Negative for dysuria, hematuria, urinary frequency, urinary urgency, urinary hesitancy or flank pain. Negative except for stated in  HPI.  ENDOCRINE: Negative for fatigue, heat intolerance, cold intolerance, polyuria, polydipsia, or polyphagia.? Negative except for stated in HPI.  PSYCHOLOGICAL: Negative for depression, anxiety, suicidal or homicidal ideations, hallucinations??or?yi.? Negative except for?stated in HPI.  MUSCULOSKELETAL:? Negative except for?stated in HPI.  INTEGUMENT: Negative for rash?or lesions. Negative except for stated in HPI.  NEUROLOGY: Negative for headaches, dizziness, numbness, tingling,?vertigo?or gait disturbances. Negative except for stated in HPI.  ?  ?  Physical Exam  Vitals & Measurements  T:?36.6? ?C (Oral)? HR:?60(Apical)? RR:?18? BP:?110/62? SpO2:?100%?  HT:?180.00?cm? WT:?120.800?kg? BMI:?37.28?  GENERAL:?Alert and oriented to person, place, time and situation,?NAD  HEENT: NCAT, Pupils equally round and reactive to light accommodation,?normal sclera, ?moist mucous membranes,?,??TM clear bilaterally, neck?supple,?thyroid normal,?no lymphadenopathy; right?ear cerumen impaction..  CARDIOVASCULAR:?Regular rateand?rhythm,? S1 and S2 normal;?no murmurs, no rubs, or no gallops; no carotid bruit.  RESPIRATORY:??Lungs clear to auscultation bilaterally;?no wheezes,?no rhonchi,?or? no crackles  ABDOMEN: Soft/ Nontender/ Nondistended; Bowel Sounds x4 - normoactive; no abdominal pulsatile mass, no masses, no hernias; no hepatosplenomegaly  EXTREMITY:? No clubbing, no cyanosis and?no peripheral edema; Dorsalis pedis and tibial ?pulses 2+  MUSCULOSKELETAL: FROM of Upper and Lower extremities; Strength 5/5 of Upper and Lower extremities; slight MCP swelling at 2nd and 3rd digits right hand  PSYCHOLOGICAL: Good judgement and insight; normal affect and mood.  NEUROLOGICAL: Normal gait and ?normal sensation;?no focal deficits; Cranial Nerves 2 -12 grossly intact  ?  Assessment/Plan  1.?Hypertension?I10  Chronic issue/ stable  Goal BP <140/90; goal met!  Continue to?monitor BP daily and bring in log at next visit  Continue low  sodium eating habit of ?less than 2 grams/ day/Dash eating habit  Encourage exercise for 30 minutes 5 days/ week (total 150 minutes/week)  Continue with Coreg 12.5 mg po BID,??Entresto 24mg/26 mg po daily , and ASA 81 mg po daily  Ordered:  Clinic Follow up, *Est. 20 3:00:00 CST, Cerumin disimpaction needing 30 minute visit with chronic issues, Order for future visit, Hypertension  Hepatitis C virus infection  SRUTHI positive  Cerumen impaction, [facility]  Office/Outpatient Visit Level 4 Established [id number] PC, Hypertension  SRUTHI positive  Hepatitis C virus infection  CAD (coronary artery disease)  CKD (chronic kidney disease) stage 2, GFR 60-89 ml/min  Cerumen impaction, [facility], 10/27/20 10:57:00 CDT  ?  2.?CAD (coronary artery disease)?I25.10  Chronic issue  Maintain healthy weight (BMI)  Discussed compliance with low sodium diet, low fat diet, and low carbohydrate ADA diet  Control modifiable risk factors: BP <130/80, LDL <70, and if DM2 HgA1c <7  Continue medications:  -Coreg 12.5 mg po BID,??Entresto 24mg/26 mg po daily , and ASA 81 mg po daily  Keep?follow up appointment?with Cardiologist Dr. Latrell Raymond  Ordered:  Office/Outpatient Visit Level 4 Established [id number] PC, Hypertension  SRUTHI positive  Hepatitis C virus infection  CAD (coronary artery disease)  CKD (chronic kidney disease) stage 2, GFR 60-89 ml/min  Cerumen impaction, [facility], 10/27/20 10:57:00 CDT  ?  3.?Hepatitis C virus infection?B19.20  New problem/ requiring further work up  Internal referral to [facility] ID for new onset Hep C infection-Hep C Ab with HCV RNA Quantitative RCR 2.9 million IU  HCV Genotypin a  Ultrasound Abdomen Limited (10/15/2020)ordered: Liver unremarkable  Contacted via [facility] Elsi VASQUEZ NP about pt concerns about hep C medications affecting renal function (see HPI).  Ordered:  Clinic Follow up, *Est. 20 3:00:00 CST, Cerumin disimpaction needing 30 minute visit with  chronic issues, Order for future visit, Hypertension  Hepatitis C virus infection  SRUTHI positive  Cerumen impaction, [facility]  Office/Outpatient Visit Level 4 Established [id number] PC, Hypertension  SRUTHI positive  Hepatitis C virus infection  CAD (coronary artery disease)  CKD (chronic kidney disease) stage 2, GFR 60-89 ml/min  Cerumen impaction, [facility], 10/27/20 10:57:00 CDT  ?  4.?CKD (chronic kidney disease) stage 2, GFR 60-89 ml/min?N18.2  Chronic issue/stable  GFR?with BUN/ Creatinine  Follow low sodium diet (less than 2 grams a day)  Control diabetes (goal A1C <7.0%)  Control high blood pressure ( goal BP < 130/80, please record BP at home every day and bring log to next office visit)  Exercise at least 30 minutes a day, 5 days a week.  Maintain healthy weight.  Stay well hydrated with water  Receive Pneumovax and Flu? if indicated.  Do not take NSAIDs (Ibuprofen, Naproxen, Aleve, Advil, Toradol, Mobic), may take only Tylenol as needed for pain/headaches.  Take cholesterol-lowering medications as prescribed (LDL goal <100)  Continue Entresto 24mg/26 mg po daily  Ordered:  Office/Outpatient Visit Level 4 Established [id number] PC, Hypertension  SRUTHI positive  Hepatitis C virus infection  CAD (coronary artery disease)  CKD (chronic kidney disease) stage 2, GFR 60-89 ml/min  Cerumen impaction, [facility], 10/27/20 10:57:00 CDT  ?  5.?SRUTHI positive?R76.8  New problem  Found by ID on eval of Hepatitis C  SRUTHI/ Autoimmune workup ordered (See labs ordered below?)  Ordered:  Anti-dsDNA (Double-stranded) Abs-[facility] [id number], Routine collect, *Est. 10/27/20 3:00:00 CDT, Blood, Order for future visit, *Est. Stop date 10/27/20 3:00:00 CDT, Lab Collect, No Charge, SRUTHI positive, 10/27/20 10:54:00 CDT  Anti-Nuclear(SRUTHI) IgG Ab w/ Rflx to [id number]-[facility] [id number], Routine collect, *Est. 10/27/20 3:00:00 CDT, Blood, Order for future visit, *Est. Stop date 10/27/20 3:00:00 CDT, Lab Collect, No Charge, SRUTHI  positive, 10/27/20 10:54:00 CDT  Clinic Follow up, *Est. 11/24/20 3:00:00 CST, Cerumin disimpaction needing 30 minute visit with chronic issues, Order for future visit, Hypertension  Hepatitis C virus infection  SRUTHI positive  Cerumen impaction, [facility]  CRP, Routine collect, *Est. 10/27/20 3:00:00 CDT, Blood, Order for future visit, *Est. Stop date 10/27/20 3:00:00 CDT, Lab Collect, SRUTHI positive, 10/27/20 10:54:00 CDT  HLA B27 Disease [facility] [id number], Routine collect, *Est. 10/27/20 3:00:00 CDT, Blood, Order for future visit, *Est. Stop date 10/27/20 3:00:00 CDT, Lab Collect, No Charge, SRUTHI positive, 10/27/20 10:54:00 CDT  Office/Outpatient Visit Level 4 Established [id number] PC, Hypertension  SRUTHI positive  Hepatitis C virus infection  CAD (coronary artery disease)  CKD (chronic kidney disease) stage 2, GFR 60-89 ml/min  Cerumen impaction, [facility], 10/27/20 10:57:00 CDT  Rheumatoid Factor IgM, IgG, IgA by [id number], Routine collect, *Est. 10/27/20 3:00:00 CDT, Blood, Order for future visit, *Est. Stop date 10/27/20 3:00:00 CDT, Lab Collect, No Charge, SRUTHI positive, 10/27/20 10:54:00 CDT  Rheumatoid Factor Quantitative, Routine collect, *Est. 10/27/20 3:00:00 CDT, Blood, Order for future visit, *Est. Stop date 10/27/20 3:00:00 CDT, Lab Collect, No Charge, SRUTHI positive, 10/27/20 10:54:00 CDT  Sedimentation Rate, Routine collect, *Est. 10/27/20 3:00:00 CDT, Blood, Order for future visit, *Est. Stop date 10/27/20 3:00:00 CDT, Lab Collect, No Charge, SRUTHI positive, 10/27/20 10:54:00 CDT  Smith Antibodies-[facility] [id number], Routine collect, *Est. 10/27/20 3:00:00 CDT, Blood, Order for future visit, *Est. Stop date 10/27/20 3:00:00 CDT, Lab Collect, No Charge, SRUTHI positive, 10/27/20 10:54:00 CDT  SSA(RO) Antibody, Routine collect, *Est. 10/27/20 3:00:00 CDT, Blood, Order for future visit, *Est. Stop date 10/27/20 3:00:00 CDT, Lab Collect, SRUTHI positive, 10/27/20 10:55:00 CDT  SSB  (La) (SUMMER) IgG Antibody-[facility] [id number], Routine collect, *Est. 10/27/20 3:00:00 CDT, Blood, Order for future visit, *Est. Stop date 10/27/20 3:00:00 CDT, Lab Collect, SRUTHI positive, 10/27/20 10:55:00 CDT  ?  6.?Cerumen impaction?H61.20  Chronic issue/reoccurring  Apply Debrox drops?taking 5 drops milliliters 3 times daily?at least 4 days ?prior to?disimpaction/next visit  Ordered:  carbamide peroxide otic, 5 drop(s), Ear-both, TID, 4 days prior to cerumen disimpaction/ next visit, X 4 day(s), # 30 mL, 0 Refill(s), Pharmacy: [id number], 180, cm, Height/Length Dosing, 10/27/20 9:25:00 CDT, 120.8, kg, Weight Dosing, 10/27/20 9:25:00 CDT  Clinic Follow up, *Est. 11/24/20 3:00:00 CST, Cerumin disimpaction needing 30 minute visit with chronic issues, Order for future visit, Hypertension  Hepatitis C virus infection  SRUTHI positive  Cerumen impaction, [facility]  Office/Outpatient Visit Level 4 Established [id number] PC, Hypertension  SRUTHI positive  Hepatitis C virus infection  CAD (coronary artery disease)  CKD (chronic kidney disease) stage 2, GFR 60-89 ml/min  Cerumen impaction, [facility], 10/27/20 10:57:00 CDT  ?  Return to clinic in 6 weeks?for cerumen disimpaction and SRUTHI positive?laboratory work-up?polyarthritis  Referrals  Clinic Follow up, *Est. 11/24/20 3:00:00 CST, Cerumin disimpaction needing 30 minute visit with chronic issues, Order for future visit, Hypertension  Hepatitis C virus infection  SRUTHI positive  Cerumen impaction, [facility]   Problem List/Past Medical History  Ongoing  Ankle pain, right  CAD (coronary artery disease)  CKD (chronic kidney disease) stage 2, GFR 60-89 ml/min  Degenerative disk disease  Dizziness(  Confirmed  )  Foot pain, right  Glomus tympanicum tumor  H/O ventricular tachycardia  History of automatic internal cardiac defibrillator (AICD)  Hypertension  Impacted cerumen of both ears  Irregular heartbeat  Noncompliance with  medication regimen  Nonischemic cardiomyopathy  Obesity  Seborrheic keratoses  Swelling of hand  Tinea corporis  Tinnitus  Vitamin D deficiency  Historical  Conductive hearing loss  Dermatophytosis of nail  Hyperlipidemia  Otitis media  Pregnant  Pregnant  Ventricular fibrillation 427.41  Procedure/Surgical History  Reposition Pacing Defibrillator Lead (10/21/2015)  Repositioning of previously implanted transvenous pacemaker or implantable defibrillator (right atrial or right ventricular) electrode (10/21/2015)  Revision of Cardiac Lead in Heart, Percutaneous Approach (10/21/2015)  Implantation or replacement of automatic cardioverter/defibrillator, total system [AICD] (07/24/2015)  Insertion Single/Dual Anahi Pulse Generator Leads (07/24/2015)  Angiocardiography of left heart structures (07/20/2015)  Cardiopulmonary resuscitation, not otherwise specified (07/20/2015)  Continuous invasive mechanical ventilation for less than 96 consecutive hours (07/20/2015)  Coronary arteriography using two catheters (07/20/2015)  Insertion of endotracheal tube (07/20/2015)  Left heart cardiac catheterization (07/20/2015)  Left Heart Cath w/COR Angio Ventriculography (None) (07/20/2015)  Benign neoplasm of skin of arm (1980)  Dermoid cyst (1980)  Hysterectomy (1980)  Dental extraction   Medications  carvedilol 12.5 mg oral tablet, See Instructions  Debrox 6.5% otic solution, 5 drop(s), Ear-Both, TID  Entresto 24 mg-26 mg oral tablet, 1 tab(s), Oral, BID  Epclusa 400 mg-100 mg oral tablet, 1 tab(s), Oral, Daily, 2 refills,? ?Not taking  EQ Aspirin Adult Low Dose 81 MG Oral Tablet Delayed Release, See Instructions  Allergies  Vitamin D3?(Swelling - edema - symptom, Hand and joint of hands swelling)  Toradol?(Tongue finding)  penicillins?(Hives)  Vitamin D2?(Pain)  Social History  Abuse/Neglect  No, No, Yes, 10/27/2020  Alcohol - Denies Alcohol Use, 07/14/2014  Never, 04/09/2019  Employment/School - No Risk, 02/28/2015  Unemployed,  04/09/2019  Unemployed, Highest education level: High school., 01/04/2017  Exercise - Does not exercise, 02/28/2015  Exercise duration: 10. Exercise frequency: Daily. Self assessment: Fair condition. Exercise type: Walking., 01/04/2017  Financial/Legal Situation  None, 09/15/2020  Home/Environment  Lives with Children., 04/09/2019  Lives with Children. Living situation: Home/Independent. Home equipment: Walker/Cane, blood pressure machine. Alcohol abuse in household: No. Substance abuse in household: No. Smoker in household: No. Injuries/Abuse/Neglect in household: No. Feels unsafe at home: No. Safe place to go: Yes. Agency(s)/Others notified: No. Family/Friends available for support: Yes. Concern for family members at home: No. Major illness in household: No. Financial concerns: Yes. TV/Computer concerns: No., 01/04/2017    Never in , 08/03/2020  Nutrition/Health  Type of diet: Low Sodium Diet. Regular, Caffeine intake amount: Coffee and Soda on occassion. Diet restrictions: None. Vitamin/Supplements: Vitamin D3. Wants to lose weight: Yes. Sleeping concerns: No., 04/09/2019  fruits and vegetables, Wants to lose weight: Yes. Sleeping concerns: Yes. Feels highly stressed: No., 01/04/2017  Sexual  Spiritual/Cultural  [facility], 12/03/2019  Substance Use - Denies Substance Abuse, 07/14/2014  Never, 04/09/2019  Tobacco - Denies Tobacco Use, 07/14/2014  Former smoker, quit more than 30 days ago, N/A, 10/27/2020  Family History  Diabetes mellitus type 2: Sister.  Hypertension.: Mother and Father.  Kidney failure: Father.  Thyroid: Mother.  Health Maintenance  Health Maintenance  ???Pending?(in the next year)  ??? ??Due?  ??? ? ? ?Aspirin Therapy for CVD Prevention due??10/11/20??and every 1??year(s)  ??? ? ? ?Colorectal Screening due??10/27/20??Unknown Frequency  ??? ? ? ?Medicare Annual Wellness Exam due??10/27/20??and every 1??year(s)  ??? ? ? ?Zoster Vaccine due??10/27/20??Unknown  Frequency  ??? ??Refused?  ??? ? ? ?Tetanus Vaccine due??10/27/20??and every 10??year(s)  ??? ??Due In Future?  ??? ? ? ?Obesity Screening not due until??01/01/21??and every 1??year(s)  ??? ? ? ?Alcohol Misuse Screening not due until??01/02/21??and every 1??year(s)  ??? ? ? ?ADL Screening not due until??09/15/21??and every 1??year(s)  ??? ? ? ?Hypertension Management-BMP not due until??10/15/21??and every 1??year(s)  ??? ? ? ?Depression Screening not due until??10/23/21??and every 1??year(s)  ???Satisfied?(in the past 1 year)  ??? ??Satisfied?  ??? ? ? ?ADL Screening on??09/15/20.??Satisfied by Melinda Bolaños LPN  ??? ? ? ?Alcohol Misuse Screening on??06/16/20.??Satisfied by Chela Alas  ??? ? ? ?Blood Pressure Screening on??10/27/20.??Satisfied by Melinda Bolaños LPN  ??? ? ? ?Body Mass Index Check on??10/27/20.??Satisfied by Melinda Bolaños LPN  ??? ? ? ?Breast Cancer Screening on??07/21/20.??Satisfied by Ashlee Martinez  ??? ? ? ?Depression Screening on??10/23/20.??Satisfied by Kemi Walsh  ??? ? ? ?Diabetes Screening on??10/15/20.??Satisfied by Roe Ortiz Jr.  ??? ? ? ?Hypertension Management-Blood Pressure on??10/27/20.??Satisfied by Melinda Bolaños LPN  ??? ? ? ?Influenza Vaccine on??10/27/20.??Satisfied by Melinda Bolaños LPN  ??? ? ? ?Lipid Screening on??10/15/20.??Satisfied by [facility]  ??? ? ? ?Obesity Screening on??10/27/20.??Satisfied by Melinda Bolaños LPN  ??? ??Refused?  ??? ? ? ?Influenza Vaccine on??12/05/19.??Recorded by Jai Rosado MD  ??? ? ? ?Tetanus Vaccine on??10/27/20.??Recorded by Melinda Bolaños LPN??Reason: Patient Refuses  ??? ? ? ?Zoster Vaccine on??10/27/20.??Recorded by Melinda Bolaños LPN??Reason: Patient Refuses  ?  Diagnostic Results  (10/15/2020 08:33 CDT US Abdomen Limited)  * Final Report *  ?  Reason For Exam  B19.20;Hepatitis  ?  Radiology Report  LIMITED ABDOMINAL ULTRASOUND 10/15/2020  ?  INDICATION: Hepatitis.  ?  TECHNIQUE: Multiple  longitudinal and transverse grayscale and color  and spectral Doppler images were obtained for sonographic evaluation  of the right upper abdomen.  ?  COMPARISON: None available.  ?  ?  FINDINGS:  The imaged segments of the inferior vena cava in the upper abdomen are  unremarkable.  ?  Portions of the pancreas are obscured by bowel gas. The visualized  pancreatic parenchyma is normal in echogenicity and without suspicious  mass lesion, pancreatic ductal dilatation, or peripancreatic free  fluid.  ?  The liver is normal in size, right lobe measuring approximately 16.2  cm in length. Hepatic contour is smooth, and parenchymal echogenicity  is within normal limits. No suspicious hepatic parenchymal mass lesion  is identified. There is no intrahepatic biliary ductal dilatation or  perihepatic free fluid. The main portal vein is patent, with  hepatopedal flow and grossly normal waveform by color and spectral  Doppler.  ?  The gallbladder is moderately distended with fluid and without  echogenic intraluminal sludge or shadowing intraluminal gallstones.  There is no gallbladder wall thickening, wall hyperemia, or  pericholecystic free fluid. Sonographic Mi sign is negative. The  common bile duct measures within normal limits at 0.2 cm.  ?  The right kidney is diminished in size, measuring approximately 8.6 cm  in length. Right renal parenchymal echogenicity is within normal  limits. No suspicious right renal parenchymal mass lesions or  shadowing stones are identified. There is no right hydronephrosis.  Color Doppler demonstrates flow to the right kidney.  ?  ?  IMPRESSION:  1. Normal size and echotexture of the liver. No suspicious hepatic  mass lesion or intrahepatic/extrahepatic biliary ductal dilatation.  Patent main portal vein.  2. No biliary sludge, cholelithiasis, or sonographic evidence of acute  cholecystitis.  3. Unremarkable sonographic evaluation of the right kidney.  ?  Signature Line  Electronically  Signed By: Jeremi Cordero MD  Date/Time Signed: 10/15/2020 09:02  ?  ? [1]     [1]?US Abdomen Limited; Jeremi Cordero MD 10/15/2020 08:33 CDT

## 2022-05-02 NOTE — HISTORICAL [facility] CERNER
This is a historical note converted from [place]. Formatting and pictures may have been removed.  Please reference [place] for original formatting and attached multimedia. 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 ?Review of patients outside cardiology records states she has nonischemic cardiomyopathy?and??4/24/19 encounter ?reflects pt was on Coreg 12.5 mg po BID, lisinopril 20 mg q day as well as lasix and vit D. Pt states her cardiologist took her off vit D and lasix on last visit. Pt stated on her initial [facility] clinic visit on 7/8/2019 that she was taking her Coreg?12.5 mg once daily??and? she had decreased her?lisinopril 40 mg down to?lisinopril 20 mg p.o. daily?because of dizziness. ?She is currently on Coreg 6.25 mg twice daily and lisinopril 20 mg daily.?? [employer] checked AICD and per pt, her cardiologist states it is functioning well. Patient states that ?did a stress test in January 2019?and wants her to do?cardiac?angiogram.? Patient is hesitant to the cardiac angiogram because she takes care of her?grandchildren?while her daughter is?holding?in the . ?She states has a full non working schedule and refuses the angiogram.  ?  HTN: Blood pressure is uncontrolled?180/84. ?Pt is on Coreg?6.25 ?mg po BID and lisinopril 20 mg po q day.? Patient states that she?adheres to a low-sodium diet.?  ?   Vit D deficiency. Pt has been standing in the sun per pt increase her vitamin D.??She states that her?fingers have swollen up from?the vitamin D supplements; therefore, she?stopped her vitamin D supplements.??Pt Patient refuses to take a vitamin D supplement despite having a?vitamin D level of?17.8?on 7/9/19.  ?  ?  Review of Systems  GENERAL:?Negative for abnormal weight loss or weight gain, fatigue, fever,  or chills. Rest of system Negative except for stated in HPI.  HEENT:??Negative for head trauma, blurred vision,??sore throat, earache, sinus congestion,??or hearing loss. Rest of system Negative except for stated in HPI.  CARDIOVASCULAR: Negative for palpitations,MEYER, ?or chest pain. Rest of system Negative except for stated in HPI.  RESPIRATORY: Negative for SOB, MEYER, cough or?wheezing. Rest of system Negative except for stated in HPI.  GASTROINTESTINAL: Negative for abdominal pain, nausea, vomiting, hematochezia, melena, ?change in bowel patterns,?constipation or diarrhea. Rest of system Negative except for stated in HPI.  GENITOURINARY: Negative for dysuria, hematuria, urinary frequency, urinary urgency, urinary hesitancy or flank pain. Rest of system Negative except for stated in HPI.  ENDOCRINE: Negative for fatigue, heat intolerance, cold intolerance, polyuria, polydipsia, or polyphagia. Rest of system Negative except for stated in HPI.  PSYCHOLOGICAL: Negative for depression, anxiety, suicidal or homicidal ideations, or ?yi. Rest of system Negative except for?stated in HPI.  MUSCULOSKELETAL: Negative for myalgia, joint pain, or joint swelling. Rest of system Negative except for?stated in HPI.  INTEGUMENT: Negative for rash and lesions. Negative except for stated in HPI.  NEUROLOGY: Negative for headaches, dizziness, numbness/ tingling in extremities, syncope, vertigo, ?or gait disturbances. Rest of system Negative except for stated in HPI.  ?  ?  Physical Exam  Vitals & Measurements  T:?36.7? ?C (Oral)? HR:?91(Peripheral)? RR:?18? BP:?180/84? SpO2:?100%?  HT:?180?cm? WT:?114.7?kg? BMI:?35.4?  GENERAL: Alert and oriented to person, place, time and situation, NAD  HEENT: NCAT, Pupils equally round and reactive to light accommodation,?normal sclera, ?moist mucous membranes, oropharynx normal, normal?nasal turbinates/ nares, ?TM clear bilaterally, neck?supple, thyroid normal, no cervical LAD.  CARDIOVASCULAR:  Regular rate and rhythm,? S1 and S2 normal; no murmurs, no rubs, or no gallops; no carotid bruit.  RESPIRATORY:? Lungs clear to auscultation bilaterally; no wheezes, no rales, or no crackles  ABDOMEN: Soft/ Nontender/ Nondistended; Bowel Sounds- normoactive; no abdominal pulsatile mass, no masses, no hernias  EXTREMITY:? No clubbing, no cyanosis and no edema; Dorsalis pedis pulses 2+  MUSCULOSKELETAL: FROM of Upper and Lower extremities; Strength 5/5 of Upper and Lower extremities  PSYCHOLOGICAL: Good judgement and insight; normal affect and mood.  NEUROLOGICAL: Normal gait and ?normal sensation;?no focal deficits; Cranial Nerves 2 -12 grossly intact  ?  Assessment/Plan  1.?Hypertension?I10  ?Pt agrees to take Coreg 12. 5 mg po BID. Continue lisinopril 20 mg po q day. ?CBC with differential and CMP ordered. ?Labs resulted after?clinic visit?within normal limits.  Ordered:  1160F- Medication reconciliation completed during visit, Hypertension  Vitamin D deficiency  Nonischemic cardiomyopathy  H/O ventricular tachycardia  History of automatic internal cardiac defibrillator (AICD), [facility], 10/11/19 10:49:00 CDT  Clinic Follow Up [facility], *Est. 10/25/19 10:15:00 CDT, Order for future visit, [facility], [facility]  Office/Outpatient Visit Level 4 Established [id number] PC, Hypertension  Vitamin D deficiency  Nonischemic cardiomyopathy  H/O ventricular tachycardia  History of automatic internal cardiac defibrillator (AICD), [facility], 10/11/19 10:49:00 CDT  ?  2.?Vitamin D deficiency?E55.9  ?Vitamin D level ordered.  Ordered:  1160F- Medication reconciliation completed during visit, Hypertension  Vitamin D deficiency  Nonischemic cardiomyopathy  H/O ventricular tachycardia  History of automatic internal cardiac defibrillator (AICD), [facility], 10/11/19 10:49:00 CDT  Clinic Follow Up [facility], *Est. 10/25/19 10:15:00 CDT, Order for future visit, [facility]  Office/Outpatient Visit Level 4 Established [id number] PC, Hypertension  Vitamin D deficiency  Nonischemic cardiomyopathy  H/O ventricular tachycardia  History of automatic internal cardiac defibrillator (AICD), [facility], 10/11/19 10:49:00 CDT  Vitamin D, 25-Hydroxy Level, Routine collect, 10/11/19 14:34:00 CDT, Blood, Order for future visit, Stop date 10/11/19 14:34:00 CDT, Lab Collect, Vitamin D deficiency, 10/11/19 14:34:00 CDT  ?  3.?Nonischemic cardiomyopathy?I42.8  Follow-up with ?Oscar Raymond, cardiologist. ?FLP ordered and within normal limits.? Start Coreg?12.5 mg?taking 1 tab p.o. twice daily?and continue lisinopril 20 mg?taking 1 tab p.o.?daily. ?Continue aspirin daily.  Ordered:  1160F- Medication reconciliation completed during visit, Hypertension  Vitamin D deficiency  Nonischemic cardiomyopathy  H/O ventricular tachycardia  History of automatic internal cardiac defibrillator (AICD), [facility], 10/11/19 10:49:00 CDT  [place] [facility]Est. 10/25/19 10:15:00 CDT, Order for future visit, Hypertension, [facility]  Office/Outpatient Visit Level 4 Established [id number] PC, Hypertension  Vitamin D deficiency  Nonischemic cardiomyopathy  H/O ventricular tachycardia  History of automatic internal cardiac defibrillator (AICD), [facility], 10/11/19 10:49:00 CDT  ?  4.?H/O ventricular tachycardia?Z86.79  ?Follow-up with Dr. Oscar Raymond.? Patient in regular rate and rhythm?on examination.  Ordered:  1160F- Medication reconciliation completed during visit, Hypertension  Vitamin D deficiency  Nonischemic cardiomyopathy  H/O ventricular tachycardia  History of automatic internal cardiac defibrillator (AICD), [facility], 10/11/19 10:49:00 CDT  Clinic Follow [place] [facility], *Est. 10/25/19 10:15:00 CDT, Order for future visit, Hypertension, [facility]  Office/Outpatient Visit Level 4 Established [id number] PC, Hypertension  Vitamin D deficiency  Nonischemic  cardiomyopathy  H/O ventricular tachycardia  History of automatic internal cardiac defibrillator (AICD), [facility], 10/11/19 10:49:00 CDT  ?  5.?History of automatic internal cardiac defibrillator (AICD)?Z95.810  ?Continue to follow-up with Dr. Oscar Raymond and check?AICD?as needed with [employer].  Ordered:  1160F- Medication reconciliation completed during visit, Hypertension  Vitamin D deficiency  Nonischemic cardiomyopathy  H/O ventricular tachycardia  History of automatic internal cardiac defibrillator (AICD), [facility], 10/11/19 10:49:00 CDT  Clinic Follow Up [facility], *Est. 10/25/19 10:15:00 CDT, Order for future visit, Hypertension, [facility]  Office/Outpatient Visit Level 4 Established [id number] PC, Hypertension  Vitamin D deficiency  Nonischemic cardiomyopathy  H/O ventricular tachycardia  History of automatic internal cardiac defibrillator (AICD), [facility], 10/11/19 10:49:00 CDT  ?  Orders:  aspirin, 81 mg = 1 tab(s), Oral, Daily, # 30 tab(s), 0 Refill(s), Pharmacy: [id number]  carvedilol, 12.5 mg = 1 tab(s), Oral, BID, # 60 tab(s), 0 Refill(s), Pharmacy: [id number]  lisinopril, 20 mg = 1 tab(s), Oral, Daily, # 30 tab(s), 0 Refill(s), Pharmacy: [id number]  Routine Spec Collect Venipuncture - Lab blood collect, 10/11/19 11:39:00 CDT, [facility], Routine, 10/11/19 11:39:00 CDT  RTC in 2 weeks for HTN  Referrals  Clinic Follow Up [facility], *Est. 10/25/19 10:15:00 CDT, Order for future visit, Hypertension, [facility]   Problem List/Past Medical History  Ongoing  Degenerative disk disease  Dizziness(  Confirmed  )  Glomus tympanicum tumor  H/O ventricular tachycardia  History of automatic internal cardiac defibrillator (AICD)  Hypertension  Irregular heartbeat  Noncompliance with medication regimen  Nonischemic cardiomyopathy  Obesity  Tinnitus  Vitamin D deficiency  Historical  Conductive hearing loss  Dermatophytosis of  nail  Hyperlipidemia  Otitis media  Ventricular fibrillation 427.41  Procedure/Surgical History  Reposition Pacing Defibrillator Lead (10/21/2015)  Repositioning of previously implanted transvenous pacemaker or implantable defibrillator (right atrial or right ventricular) electrode (10/21/2015)  Revision of Cardiac Lead in Heart, Percutaneous Approach (10/21/2015)  Implantation or replacement of automatic cardioverter/defibrillator, total system [AICD] (07/24/2015)  Insertion Single/Dual Anahi Pulse Generator Leads (07/24/2015)  Angiocardiography of left heart structures (07/20/2015)  Cardiopulmonary resuscitation, not otherwise specified (07/20/2015)  Continuous invasive mechanical ventilation for less than 96 consecutive hours (07/20/2015)  Coronary arteriography using two catheters (07/20/2015)  Insertion of endotracheal tube (07/20/2015)  Left heart cardiac catheterization (07/20/2015)  Left Heart Cath w/COR Angio Ventriculography (None) (07/20/2015)  Benign neoplasm of skin of arm (1980)  Dermoid cyst (1980)  Hysterectomy (1980)  Dental extraction   Medications  aspirin 81 mg oral Delayed Release (EC) tablet, 81 mg= 1 tab(s), Oral, Daily  Coreg 12.5 mg oral tablet, 12.5 mg= 1 tab(s), Oral, BID  lisinopril 20 mg oral tablet, 20 mg= 1 tab(s), Oral, Daily  Allergies  Toradol?(Tongue finding)  penicillins?(Hives)  Social History  Alcohol - Denies Alcohol Use, 07/14/2014  Never, 04/09/2019  Employment/School - No Risk, 02/28/2015  Unemployed, 04/09/2019  Unemployed, Highest education level: High school., 01/04/2017  Exercise - Does not exercise, 02/28/2015  Exercise frequency: Daily. Self assessment: Fair condition. Exercise type: Walking., 04/09/2019  Exercise duration: 10. Exercise frequency: Daily. Self assessment: Fair condition. Exercise type: Walking., 01/04/2017  Home/Environment  Lives with Children., 04/09/2019  Lives with Children. Living situation: Home/Independent. Home equipment: Walker/Cane, blood  pressure machine. Alcohol abuse in household: No. Substance abuse in household: No. Smoker in household: No. Injuries/Abuse/Neglect in household: No. Feels unsafe at home: No. Safe place to go: Yes. Agency(s)/Others notified: No. Family/Friends available for support: Yes. Concern for family members at home: No. Major illness in household: No. Financial concerns: Yes. TV/Computer concerns: No., 01/04/2017  Nutrition/Health  Type of diet: Low Sodium Diet. Regular, Caffeine intake amount: Coffee and Soda on occassion. Diet restrictions: None. Vitamin/Supplements: Vitamin D3. Wants to lose weight: Yes. Sleeping concerns: No., 04/09/2019  fruits and vegetables, Wants to lose weight: Yes. Sleeping concerns: Yes. Feels highly stressed: No., 01/04/2017  Sexual  Substance Use - Denies Substance Abuse, 07/14/2014  Never, 04/09/2019  Tobacco - Denies Tobacco Use, 07/14/2014  Former smoker, quit more than 30 days ago, Cigarettes, N/A, 07/08/2019  Family History  Diabetes mellitus type 2: Sister.  Hypertension.: Mother and Father.  Kidney failure: Father.  Thyroid: Mother.  Health Maintenance  Health Maintenance  ???Pending?(in the next year)  ??? ??OverDue  ??? ? ? ?Coronary Artery Disease Maintenance-Antiplatelet Agent Prescribed due??and every?  ??? ? ? ?Diabetes Screening due??and every?  ??? ??Due?  ??? ? ? ?Colorectal Screening due??10/11/19??and every?  ??? ? ? ?Coronary Artery Disease Maintenance-Lipid Lowering Therapy due??10/11/19??and every?  ??? ? ? ?Influenza Vaccine due??10/11/19??and every?  ??? ? ? ?Tetanus Vaccine due??10/11/19??and every 10??year(s)  ??? ? ? ?Zoster Vaccine due??10/11/19??and every 100??year(s)  ??? ??Refused?  ??? ? ? ?Alcohol Misuse Screening due??01/01/19??and every 1??year(s)  ??? ??Due In Future?  ??? ? ? ?Obesity Screening not due until??01/01/20??and every 1??year(s)  ??? ? ? ?Depression Screening not due until??07/07/20??and every 1??year(s)  ??? ? ? ?ADL Screening not due  until??07/08/20??and every 1??year(s)  ??? ? ? ?Blood Pressure Screening not due until??10/10/20??and every 1??year(s)  ??? ? ? ?Body Mass Index Check not due until??10/10/20??and every 1??year(s)  ??? ? ? ?Hypertension Management-Blood Pressure not due until??10/10/20??and every 1??year(s)  ??? ? ? ?Hypertension Management-BMP not due until??10/10/20??and every 1??year(s)  ???Satisfied?(in the past 1 year)  ??? ??Satisfied?  ??? ? ? ?ADL Screening on??07/08/19.??Satisfied by Tawanna Zeng LPN  ??? ? ? ?Aspirin Therapy for CVD Prevention on??10/11/19.??Satisfied by Jai Rosado MD  ??? ? ? ?Blood Pressure Screening on??10/11/19.??Satisfied by Serene Navas  ??? ? ? ?Body Mass Index Check on??10/11/19.??Satisfied by Serene Navas  ??? ? ? ?Breast Cancer Screening on??01/09/19.??Satisfied by Ashlee Martinez  ??? ? ? ?Coronary Artery Disease Maintenance-Antiplatelet Agent Prescribed on??10/11/19.??Satisfied by Jai Rosado MD  ??? ? ? ?Depression Screening on??07/08/19.??Satisfied by Tawanna Zeng LPN  ??? ? ? ?Diabetes Screening on??10/11/19.??Satisfied by Josselyn Mariee  ??? ? ? ?Hypertension Management-BMP on??10/11/19.??Satisfied by Josselyn Mariee  ??? ? ? ?Influenza Vaccine on??12/06/18.??Satisfied by Bishnu Hopson RN  ??? ? ? ?Lipid Screening on??10/11/19.??Satisfied by Josselyn Mariee  ??? ? ? ?Obesity Screening on??10/11/19.??Satisfied by Serene Navas  ??? ??Refused?  ??? ? ? ?Alcohol Misuse Screening on??10/11/19.??Recorded by Serene Navas??Reason: Patient Refuses  ?

## 2022-05-02 NOTE — HISTORICAL [facility] CERNER
This is a historical note converted from [employer]. Formatting and pictures may have been removed.  Please reference Rocco for original formatting and attached multimedia. 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 Reviewed and discussed?labs from 8/17/2020 ordered by Cardiologist.  ?  Patient states that?he?is adhering to a?low fat and ?low sodium and medications.  ?   HTN: Pt adheres to low sodium eating habit. Currently on Coreg 12.5 mg po BID and Entresto 24mg/26 mg po daily. Pt denies HA, chest pain, dizziness, SOB, MEYER.  ?  Hepatitis C:?Noticed Hep C AB reactive with RNA 2.9 million. discussed with patient who was not aware of her Hep C status. She has no risky behavior-no IVDA, no tattoos, no illicit drug use, and no risk sexual behavior. Did have a blood transfusion in 1970s and 1986. with hysterectomy.  ?  Left thumb pain/ De Quervains tenosynovitis: Duration:? 1wk; associated ?with swelling and?aching pain with slight numbness and tingling in the left thumb. Pt states hurts to ; nothing makes it feel better. Pain 7/10. Pt denies trauma.  ?  SK: Duration: 12 yrs?; has grown; no pruritics, no bleeding of lesion.  ?  Vit D deficiency: Vitamin D (8/17/2020):?18.9. ?Patient was prescribed? ergocalciferol 68339 units q week? for Vit D deficiency?by cardiologist.? Discussed this with the patient and the patient refuses to take the ergocalciferol because she states?it makes her hands swell.  ?  Review of Systems  GENERAL:?Negative for abnormal weight loss or weight gain, fatigue, fever, or chills. Negative except for stated in HPI.  HEENT:??Negative for head trauma,?blurred vision, rhinorrhea, ?nasal congestion, sore throat or hearing  deficit. Negative except for stated in HPI.  CARDIOVASCULAR: Negative for? chest pain, palpitations, MEYRE, PND, orthopnea, peripheral edema or syncope. Negative except for stated in HPI.  RESPIRATORY: Negative for SOB, MEYER, cough or?wheezing.? Negative except for stated in HPI.  GASTROINTESTINAL: Negative for abdominal pain, nausea,??vomiting, diarrhea, constipation, heartburn, ?hematochezia? or melena.? Negative except for stated in HPI.  GENITOURINARY: Negative for dysuria, hematuria, urinary frequency, urinary urgency, urinary hesitancy or flank pain. Negative except for stated in HPI.  ENDOCRINE: Negative for fatigue, heat intolerance, cold intolerance, polyuria, polydipsia, or polyphagia.? Negative except for stated in HPI.  PSYCHOLOGICAL: Negative for depression, anxiety, suicidal or homicidal ideations, hallucinations??or?yi.? Negative except for?stated in HPI.  MUSCULOSKELETAL:? Negative except for?stated in HPI.  INTEGUMENT: .Negative except for stated in HPI.  NEUROLOGY: Negative for headaches, dizziness, numbness, tingling,?vertigo?or gait disturbances. Negative except for stated in HPI.  ?  ?  Physical Exam  Vitals & Measurements  T:?36.9? ?C (Oral)? HR:?36(Peripheral)? RR:?18? BP:?128/61? SpO2:?100%?  HT:?180.00?cm? WT:?120.100?kg? BMI:?37.07?  GENERAL:?Alert and oriented to person, place, time and situation,?NAD  HEENT: NCAT, Pupils equally round and reactive to light accommodation,?normal sclera, ?moist mucous membranes,??TM clear bilaterally, neck?supple,?thyroid normal,?no lymphadenopathy.  CARDIOVASCULAR:?Regular rateand?Regular irregular rhythm,? S1 and S2 normal;?no murmurs, no rubs, or no gallops; no carotid bruit.  RESPIRATORY:??Lungs clear to auscultation bilaterally;?no wheezes,?no rhonchi,?or? no crackles  ABDOMEN: Soft/ Nontender/ Nondistended; Bowel Sounds x4 - normoactive; no abdominal pulsatile mass, no masses, no hernias; no hepatosplenomegaly  EXTREMITY:? No clubbing, no cyanosis  and?no peripheral edema; Dorsalis pedis and tibial ?pulses 2+  MUSCULOSKELETAL: FROM of Upper and Lower extremities; Strength 5/5 of Upper and Lower extremities; Left thumb: + Finkelstein.  PSYCHOLOGICAL: Good judgement and insight; normal affect and mood.  NEUROLOGICAL: Normal gait and ?normal sensation;?no focal deficits; Cranial Nerves 2 -12 grossly intact  INTEGUMENTARY: 1.5 x 1.5 stuck on waxy lesion at 5 oclock on right breast  Assessment/Plan  1.?Hypertension?I10  Chronic issue/ stable  Goal BP <140/90  Continue to?monitor BP daily and bring in log at next visit  Continue low sodium eating habit of ?less than 2 grams/ day/Dash eating habit  Encourage exercise for 30 minutes 5 days/ week (total 150 minutes/week)  Continue Coreg 12.5 mg po BID,??Entresto 24mg/26 mg po daily , and ASA 81 mg po daily  CBC with differential, CMP, TSH, FT4, FLP and HgA1c ordered in 3 months  Ordered:  CBC w/ Auto Diff, Routine collect, *Est. 12/01/20 3:00:00 CST, Blood, Order for future visit, *Est. Stop date 12/01/20 3:00:00 CST, Lab Collect, Hypertension  CAD (coronary artery disease)  CKD (chronic kidney disease) stage 2, GFR 60-89 ml/min  Hyperglycemia  Hepa...  Comprehensive Metabolic Panel, Routine collect, *Est. 12/01/20 3:00:00 CST, Blood, Order for future visit, *Est. Stop date 12/01/20 3:00:00 CST, Lab Collect, Hypertension  CAD (coronary artery disease)  CKD (chronic kidney disease) stage 2, GFR 60-89 ml/min  Hyperglycemia  Hepa...  Free T4, Routine collect, *Est. 12/01/20 3:00:00 CST, Blood, Order for future visit, *Est. Stop date 12/01/20 3:00:00 CST, Lab Collect, Hypertension, 09/01/20 15:02:00 CDT  Hemoglobin A1C [facility], Routine collect, *Est. 12/01/20 3:00:00 CST, Blood, Order for future visit, *Est. Stop date 12/01/20 3:00:00 CST, Lab Collect, Hypertension, 09/01/20 15:02:00 CDT  Lipid Panel, Routine collect, *Est. 12/01/20 3:00:00 CST, Blood, Order for future visit, *Est. Stop date 12/01/20 3:00:00 CST, Lab  Collect, Hypertension, 09/01/20 15:01:00 CDT  Thyroid Stimulating Hormone, Routine collect, *Est. 12/01/20 3:00:00 CST, Blood, Order for future visit, *Est. Stop date 12/01/20 3:00:00 CST, Lab Collect, Hypertension, 09/01/20 15:01:00 CDT  ?  2.?Seborrheic keratoses?L82.1  Chronic issue/stable  Asymptomatic- non pruritic  Will continue to monitor  ?  3.?CAD (coronary artery disease)?I25.10  Chronic issue  Maintain healthy weight (BMI)  Discussed compliance with low sodium diet, low fat diet, and low carbohydrate ADA diet  Control modifiable risk factors: BP <130/80, LDL <70, and if DM2 HgA1c <7  Continue medications:  -Coreg 12.5 mg po BID, ?lisinopril 10 mg po daily, and ASA 81 mg po daily  Keep?follow up appointment?with Cardiologist Dr. Latrell Raymond  CBC with differential and CMPordered in 3 months  Ordered:  CBC w/ Auto Diff, Routine collect, *Est. 12/01/20 3:00:00 CST, Blood, Order for future visit, *Est. Stop date 12/01/20 3:00:00 CST, Lab Collect, Hypertension  CAD (coronary artery disease)  CKD (chronic kidney disease) stage 2, GFR 60-89 ml/min  Hyperglycemia  Hepa...  Comprehensive Metabolic Panel, Routine collect, *Est. 12/01/20 3:00:00 CST, Blood, Order for future visit, *Est. Stop date 12/01/20 3:00:00 CST, Lab Collect, Hypertension  CAD (coronary artery disease)  CKD (chronic kidney disease) stage 2, GFR 60-89 ml/min  Hyperglycemia  Hepa...  ?  4.?CKD (chronic kidney disease) stage 2, GFR 60-89 ml/min?N18.2  Chronic issue/stable  GFR?with BUN/ Creatinine  Follow low sodium diet (less than 2 grams a day)  Control diabetes (goal A1C <7.0%)  Control high blood pressure ( goal BP < 130/80, please record BP at home every day and bring log to next office visit)  Exercise at least 30 minutes a day, 5 days a week.  Maintain healthy weight.  Stay well hydrated with water  Receive Pneumovax and Flu? if indicated.  Do not take NSAIDs (Ibuprofen, Naproxen, Aleve, Advil, Toradol, Mobic), may take only  Tylenol as needed for pain/headaches.  Take cholesterol-lowering medications as prescribed (LDL goal <100)  Continue lisinopril 10 mg po daily  CBC with differential and CMP ordered in 3 months  Ordered:  CBC w/ Auto Diff, Routine collect, *Est. 12/01/20 3:00:00 CST, Blood, Order for future visit, *Est. Stop date 12/01/20 3:00:00 CST, Lab Collect, Hypertension  CAD (coronary artery disease)  CKD (chronic kidney disease) stage 2, GFR 60-89 ml/min  Hyperglycemia  Hepa...  Comprehensive Metabolic Panel, Routine collect, *Est. 12/01/20 3:00:00 CST, Blood, Order for future visit, *Est. Stop date 12/01/20 3:00:00 CST, Lab Collect, Hypertension  CAD (coronary artery disease)  CKD (chronic kidney disease) stage 2, GFR 60-89 ml/min  Hyperglycemia  Hepa...  ?  5.?Hepatitis C virus infection?B19.20  New problem/ requiring further work up  Internal referral to [facility] for new onset Hep C infection-Hep C Ab with HCV RNA Quantitative RCR 2.9 million IU  HCV Genotyping with PCR, PT, PTT?ordered  Ultrasound Abdomen Limited ordered.  CBC with differential and CMPordered in 3 months  Ordered:  CBC w/ Auto Diff, Routine collect, *Est. 12/01/20 3:00:00 CST, Blood, Order for future visit, *Est. Stop date 12/01/20 3:00:00 CST, Lab Collect, Hypertension  CAD (coronary artery disease)  CKD (chronic kidney disease) stage 2, GFR 60-89 ml/min  Hyperglycemia  Hepa...  Comprehensive Metabolic Panel, Routine collect, *Est. 12/01/20 3:00:00 CST, Blood, Order for future visit, *Est. Stop date 12/01/20 3:00:00 CST, Lab Collect, Hypertension  CAD (coronary artery disease)  CKD (chronic kidney disease) stage 2, GFR 60-89 ml/min  Hyperglycemia  Hepa...  HCV Genotyping by PCR and Sequencing-[facility] [id number], Routine collect, 09/01/20 14:46:00 CDT, Blood, Order for future visit, Stop date 09/01/20 14:46:00 CDT, Lab Collect, Hepatitis C virus infection, 09/01/20 14:46:00 CDT  PT, Routine collect, 09/01/20 15:04:00 CDT, Blood, Order for  future visit, Stop date 09/01/20 15:04:00 CDT, Lab Collect, Hepatitis C virus infection, 09/01/20 15:04:00 CDT  PTT, Routine collect, 09/01/20 15:04:00 CDT, Blood, Order for future visit, Stop date 09/01/20 15:04:00 CDT, Lab Collect, Hepatitis C virus infection, 09/01/20 15:04:00 CDT  Referral to Infectious Disease, Hepatitis C infection, 64 yo female with new onset HepC infection-Hebp C Ab with HCV RNA Quantiative RCR 2.9 million IU. Please eval and treat. Thank you, Dr. JACKELINE Rosado, Hepatitis C virus infection  US Abdomen Limited, Routine, *Est. 09/15/20 3:00:00 CDT, Hepatitis, None, Ambulatory, Rad Type, Order for future visit, Hepatitis C virus infection, Schedule this test, [facility], *Est. 09/15/20 3:00:00 CDT  ?  6.?Hyperglycemia?R73.9  New Problem  HgA1c (8/17/2020): ?5.7  Discussed and encouraged?low carbohydrate eating habit  Hemoglobin A1c ordered in 3 months  Ordered:  CBC w/ Auto Diff, Routine collect, *Est. 12/01/20 3:00:00 CST, Blood, Order for future visit, *Est. Stop date 12/01/20 3:00:00 CST, Lab Collect, Hypertension  CAD (coronary artery disease)  CKD (chronic kidney disease) stage 2, GFR 60-89 ml/min  Hyperglycemia  Hepa...  Comprehensive Metabolic Panel, Routine collect, *Est. 12/01/20 3:00:00 CST, Blood, Order for future visit, *Est. Stop date 12/01/20 3:00:00 CST, Lab Collect, Hypertension  CAD (coronary artery disease)  CKD (chronic kidney disease) stage 2, GFR 60-89 ml/min  Hyperglycemia  Hepa...  ?  RTC in 3 months CAD, HTN, HLD, Hep C and lab results  ?  Referrals  Referral to Infectious Disease, Hepatitis C infection, 64 yo female with new onset HepC infection-Hebp C Ab with HCV RNA Quantiative RCR 2.9 million IU. Please eval and treat. Thank you, Dr. JACKELINE Rosado, Hepatitis C virus infection   Problem List/Past Medical History  Ongoing  Ankle pain, right  CAD (coronary artery disease)  CKD (chronic kidney disease) stage 2, GFR 60-89  ml/min  Degenerative disk disease  Dizziness(  Confirmed  )  Foot pain, right  Glomus tympanicum tumor  H/O ventricular tachycardia  History of automatic internal cardiac defibrillator (AICD)  Hypertension  Impacted cerumen of both ears  Irregular heartbeat  Noncompliance with medication regimen  Nonischemic cardiomyopathy  Obesity  Seborrheic keratoses  Swelling of hand  Tinea corporis  Tinnitus  Vitamin D deficiency  Historical  Conductive hearing loss  Dermatophytosis of nail  Hyperlipidemia  Otitis media  Pregnant  Pregnant  Ventricular fibrillation 427.41  Procedure/Surgical History  Reposition Pacing Defibrillator Lead (10/21/2015)  Repositioning of previously implanted transvenous pacemaker or implantable defibrillator (right atrial or right ventricular) electrode (10/21/2015)  Revision of Cardiac Lead in Heart, Percutaneous Approach (10/21/2015)  Implantation or replacement of automatic cardioverter/defibrillator, total system [AICD] (07/24/2015)  Insertion Single/Dual Anahi Pulse Generator Leads (07/24/2015)  Angiocardiography of left heart structures (07/20/2015)  Cardiopulmonary resuscitation, not otherwise specified (07/20/2015)  Continuous invasive mechanical ventilation for less than 96 consecutive hours (07/20/2015)  Coronary arteriography using two catheters (07/20/2015)  Insertion of endotracheal tube (07/20/2015)  Left heart cardiac catheterization (07/20/2015)  Left Heart Cath w/COR Angio Ventriculography (None) (07/20/2015)  Benign neoplasm of skin of arm (1980)  Dermoid cyst (1980)  Hysterectomy (1980)  Dental extraction   Medications  carvedilol 12.5 mg oral tablet, See Instructions  Entresto 24 mg-26 mg oral tablet, 1 tab(s), Oral, BID  EQ Aspirin Adult Low Dose 81 MG Oral Tablet Delayed Release, See Instructions  left thumb spica splint, See Instructions,? ?Not taking: Has not received yet  Allergies  Toradol?(Tongue finding)  penicillins?(Hives)  Vitamin D2?(Pain)  Social  History  Abuse/Neglect  No, No, Yes, 09/01/2020  Alcohol - Denies Alcohol Use, 07/14/2014  Never, 04/09/2019  Employment/School - No Risk, 02/28/2015  Unemployed, 04/09/2019  Unemployed, Highest education level: High school., 01/04/2017  Exercise - Does not exercise, 02/28/2015  Exercise duration: 10. Exercise frequency: Daily. Self assessment: Fair condition. Exercise type: Walking., 01/04/2017  Home/Environment  Lives with Children., 04/09/2019  Lives with Children. Living situation: Home/Independent. Home equipment: Walker/Cane, blood pressure machine. Alcohol abuse in household: No. Substance abuse in household: No. Smoker in household: No. Injuries/Abuse/Neglect in household: No. Feels unsafe at home: No. Safe place to go: Yes. Agency(s)/Others notified: No. Family/Friends available for support: Yes. Concern for family members at home: No. Major illness in household: No. Financial concerns: Yes. TV/Computer concerns: No., 01/04/2017    Never in , 08/03/2020  Nutrition/Health  Type of diet: Low Sodium Diet. Regular, Caffeine intake amount: Coffee and Soda on occassion. Diet restrictions: None. Vitamin/Supplements: Vitamin D3. Wants to lose weight: Yes. Sleeping concerns: No., 04/09/2019  fruits and vegetables, Wants to lose weight: Yes. Sleeping concerns: Yes. Feels highly stressed: No., 01/04/2017  Sexual  Spiritual/Cultural  [employer], 12/03/2019  Substance Use - Denies Substance Abuse, 07/14/2014  Never, 04/09/2019  Tobacco - Denies Tobacco Use, 07/14/2014  Former smoker, quit more than 30 days ago, No, 09/01/2020  Family History  Diabetes mellitus type 2: Sister.  Hypertension.: Mother and Father.  Kidney failure: Father.  Thyroid: Mother.  Health Maintenance  Health Maintenance  ???Pending?(in the next year)  ??? ??OverDue  ??? ? ? ?Coronary Artery Disease Maintenance-Antiplatelet Agent Prescribed due??and every?  ??? ??Due?  ??? ? ? ?Colorectal Screening due??09/01/20??and  every?  ??? ? ? ?Medicare Annual Wellness Exam due??09/01/20??and every 1??year(s)  ??? ? ? ?Zoster Vaccine due??09/01/20??and every?  ??? ??Refused?  ??? ? ? ?Tetanus Vaccine due??09/01/20??and every 10??year(s)  ??? ??Due In Future?  ??? ? ? ?Aspirin Therapy for CVD Prevention not due until??10/11/20??and every 1??year(s)  ??? ? ? ?Obesity Screening not due until??01/01/21??and every 1??year(s)  ??? ? ? ?Alcohol Misuse Screening not due until??01/02/21??and every 1??year(s)  ??? ? ? ?Hypertension Management-BMP not due until??06/09/21??and every 1??year(s)  ???Satisfied?(in the past 1 year)  ??? ??Satisfied?  ??? ? ? ?ADL Screening on??09/01/20.??Satisfied by Melinda Bolaños LPN  ??? ? ? ?Alcohol Misuse Screening on??06/16/20.??Satisfied by Chela Alas  ??? ? ? ?Aspirin Therapy for CVD Prevention on??10/11/19.??Satisfied by Jai Rosado MD  ??? ? ? ?Blood Pressure Screening on??09/01/20.??Satisfied by Melinda Bolaños LPN  ??? ? ? ?Body Mass Index Check on??09/01/20.??Satisfied by Melinda Bolaños LPN  ??? ? ? ?Breast Cancer Screening on??07/21/20.??Satisfied by Ashlee Martinez  ??? ? ? ?Coronary Artery Disease Maintenance-Lipid Lowering Therapy on??10/25/19.??Satisfied by Jai Rosado MD??Reason: Expectation Satisfied Elsewhere  ??? ? ? ?Depression Screening on??09/01/20.??Satisfied by Melinda Bolaños LPN  ??? ? ? ?Diabetes Screening on??06/09/20.??Satisfied by Roly Whitman  ??? ? ? ?Hypertension Management-Blood Pressure on??09/01/20.??Satisfied by Melinda Bolaños LPN  ??? ? ? ?Lipid Screening on??06/09/20.??Satisfied by Roly Whitman  ??? ? ? ?Obesity Screening on??09/01/20.??Satisfied by Melinda Bolaños LPN  ??? ??Refused?  ??? ? ? ?Alcohol Misuse Screening on??10/11/19.??Recorded by Serene Navas LPN??Reason: Patient Refuses  ??? ? ? ?Coronary Artery Disease Maintenance-Lipid Lowering Therapy on??10/25/19.??Recorded by Alonzo LUZ, Jai BARRY  ??? ? ? ?Influenza Vaccine  on??12/05/19.??Recorded by Alonzo LUZ, Jai BARRY  ??? ? ? ?Tetanus Vaccine on??09/01/20.??Recorded by Melinda Bolaños LPN??Reason: Patient Refuses  ??? ? ? ?Zoster Vaccine on??09/01/20.??Recorded by Melinda Bolaños LPN??Reason: Patient Refuses  ?

## 2022-05-02 NOTE — HISTORICAL [facility]ER
This is a historical note converted from [place]. Formatting and pictures may have been removed.  Please reference [employer] for original formatting and attached multimedia. Chief Complaint  ANNUAL  History of Present Illness  Pt is  here for annual gyn exam. Denies hx of dysplasia. Hx of hyst with BSO in  secondary to AUB-L. States took premarin for one year post surgery. Denies vaginal bleeding or discharge. C/o stress/urge incontinence. Denies dysuria/hematuria. Reports urinary frequency and nocturia. Pt currently on lasix. Pt also c/o left breast pain. States onset of pain was after ICD placement in . States she thinks I feel something. Denies change in size. Denies skin changes. Denies nipple discharge. Denies fly hx of breast, ovarian, or uterine cancer.  Review of Systems  Negative except HPI  Physical Exam  Vitals & Measurements  T:?97.8? ?F ?(Oral)? HR:?70?(Peripheral)? BP:?138/90? HT:?180?cm? HT:?180?cm? WT:?124?kg? WT:?124?kg? BMI:?38.27?  General: Alert and oriented, No acute distress.  Breast: rt-No mass, No tenderness, No discharge. left ICD noted; mild tenderness along ICD site; no palpable mass,nipple discharge, or skin changes  Gastrointestinal: Soft, Non-tender.  Gynecology:  Labia: Bilateral, Majora, Minora, Within normal limits.  Vagina: Within normal limits.  Cervix: absent.  Uterus:absent.  Ovaries:absent.  Integumentary: Warm, Dry.  Neurologic: Alert, Oriented.  Psychiatric: Cooperative, Appropriate mood & affect.  Assessment/Plan  1.?Visit for routine gyn exam  ?pelvic; pap deferred secondary to hyst for benign reasons  rtc one year  Ordered:  Clinic Follow up, *Est. 18 3:00:00 CDT, 1 Year, Order for future visit, Visit for routine gyn exam, [facility]  Preventative Health Care Est 40-64 years [id number] PC, Visit for routine gyn exam, [facility], 17 8:43:00 CDT  ?  2.?Breast pain  ?not likely related to breast as pain is localized near ICD site;will check  diagnostic mammo-f/u with cardiology  Ordered:  MG Diagnostic Left, Routine, *Est. 07/12/17 3:00:00 CDT, Other (please specify), None, Ambulatory, Rad Type, Order for future visit, Breast pain, Schedule this test, [facility], day(s), 1, week(s), In Approximately, *Est. 07/12/17 3:00:00 CDT  ?  3.?Stress incontinence  ?kegel exercises; wt loss; avoid caffeine; refer to uro/gyn  Ordered:  Internal Referral to Uro-Gynecology, see diagnosis, *Est. 08/04/17 3:00:00 CDT, Future Visit?, Stress incontinence  Urge incontinence  Urinalysis with Microscopic if Indicated, Routine collect, Urine, Order for future visit, 07/05/17 8:43:00 CDT, Stop date 07/05/17 8:43:00 CDT, Nurse collect, Stress incontinence  Urge incontinence, 07/05/17 8:43:00 CDT  Urine Culture [id number], Routine collect, 07/05/17 8:43:00 CDT, Order for future visit, Urine, Clean Catch, Nurse collect, Stop date 07/05/17 8:43:00 CDT, Stress incontinence  Urge incontinence  ?  4.?Urge incontinence  Ordered:  Internal Referral to Uro-Gynecology, see diagnosis, *Est. 08/04/17 3:00:00 CDT, Future Visit?, Stress incontinence  Urge incontinence  Urinalysis with Microscopic if Indicated, Routine collect, Urine, Order for future visit, 07/05/17 8:43:00 CDT, Stop date 07/05/17 8:43:00 CDT, Nurse collect, Stress incontinence  Urge incontinence, 07/05/17 8:43:00 CDT  Urine Culture [id number], Routine collect, 07/05/17 8:43:00 CDT, Order for future visit, Urine, Clean Catch, Nurse collect, Stop date 07/05/17 8:43:00 CDT, Stress incontinence  Urge incontinence  ?  5.?Obesity  ??Encouraged healthy diet and exercise. Recommended? limiting fried and fast foods, processed foods, and foods containing high amounts of saturated fats and sodium. Also encouraged limiting sugar intake and avoiding carbonated beverages. Recommend a diet rich in protein, vegetables, and fruits.  Ordered:  Internal Referral to Nutrition, wt loss, *Est. 08/04/17 3:00:00 CDT, Future  Visit?, Obesity  ?   Problem List/Past Medical History  Degenerative disk disease  Dizziness(  Confirmed  )  Glomus tympanicum tumor  Hypertension  Irregular heartbeat  Historical  Conductive hearing loss  Dermatophytosis of nail  Hyperlipidemia  Obesity  Otitis media  Tinnitus  Vitamin D deficiency  Procedure/Surgical History  Reposition Pacing Defibrillator Lead (10/21/2015), Repositioning of previously implanted transvenous pacemaker or implantable defibrillator (right atrial or right ventricular) electrode (10/21/2015), Revision of Cardiac Lead in Heart, Percutaneous Approach (10/21/2015), Implantation or replacement of automatic cardioverter/defibrillator, total system [AICD] (07/24/2015), Insertion Single/Dual Anahi Pulse Generator Leads (07/24/2015), Angiocardiography of left heart structures (07/20/2015), Cardiopulmonary resuscitation, not otherwise specified (07/20/2015), Continuous invasive mechanical ventilation for less than 96 consecutive hours (07/20/2015), Coronary arteriography using two catheters (07/20/2015), Insertion of endotracheal tube (07/20/2015), Left heart cardiac catheterization (07/20/2015), Left Heart Cath w/COR Angio Ventriculography (None) (07/20/2015), Abdominal hysterectomy, Benign neoplasm of skin of arm, Dental extraction, Dermoid cyst., hysterectomy.  Medications  amiodarone 200 mg oral Tab, 200 mg, 1 tab(s), Oral, Daily,? ?Not Taking per Prescriber: patient states takes daily  carvedilol 25 mg oral tablet, 25 mg, 1 tab(s), Oral, BID  furosemide 20 mg oral tablet, 20 mg, 1 tab(s), Oral, Daily  lisinopril 20 mg oral tablet, 20 mg, 1 tab(s), Oral, Daily  spironolactone 25 mg oral tablet, 25 mg, 1 tab(s), Oral, Daily,? ?Not Taking per Prescriber: patient states taking daily  Allergies  Toradol  penicillins  Social History  Alcohol - Denies Alcohol Use  Employment/School - No Risk  Unemployed, Highest education level: High school.  Exercise - Does not exercise  Exercise duration: 10.  Exercise frequency: Daily. Self assessment: Fair condition. Exercise type: Walking.  Home/Environment  Lives with Children. Living situation: Home/Independent. Home equipment: Walker/Cane, blood pressure machine. Alcohol abuse in household: No. Substance abuse in household: No. Smoker in household: No. Injuries/Abuse/Neglect in household: No. Feels unsafe at home: No. Safe place to go: Yes. Agency(s)/Others notified: No. Family/Friends available for support: Yes. Concern for family members at home: No. Major illness in household: No. Financial concerns: Yes. TV/Computer concerns: No.  Nutrition/Health  fruits and vegetables, Wants to lose weight: Yes. Sleeping concerns: Yes. Feels highly stressed: No.  Substance Abuse - Denies Substance Abuse  Tobacco - Denies Tobacco Use  Former smoker  Family History  Diabetes mellitus type 2: Sister.  Hypertension.: Mother and Father.  Kidney failure: Father.  Thyroid: Mother.  Diagnostic Results  ?  * Final Report *  ?   Reason For Exam  Encounter for screening mammogram for malignant neoplasm of breast;Screening  ?   Radiology Report  Findings: Digital screening views of both breasts were performed in CC  and MLO projections. Computer-assisted diagnosis was also utilized.  This study is compared to prior mammograms dated 10/6/2014 and  12/22/2015.  ?   There is stable scattered fibroglandular density visible bilaterally  with no suspicious microcalcification or architectural distortion  identified. No focal masses are appreciated except for a stable 10 mm  ovoid mass consistent with an intramammary lymph node in the lower  inner quadrant of the right breast. No abnormal skin thickening or  nipple retraction is seen.  ?  IMPRESSION: No mammographic findings suspicious for malignancy.  Routine follow-up annual screening mammography is recommended.  ?  BI-RADS classification:  Category 1-negative  ?  ?  ?  ?  ?  ?  Signature Line  Assessment: 1-Negative  Recommendation: Normal  interval follow-up  ?  Electronically Signed By: Declan Thompson MD  Date/Time Signed: 01/18/2017 10:57  ?  ?  This document has an image  ?  Result type:???????  MG Screening  Result date:???????  January 17, 2017 10:35 CST  Result status:???????  Auth (Verified)  Result title:???????  MG Screening  Performed by:???????  Declan Thompson MD on January 18, 2017 10:54 CST  Verified by:???????  Declan Thompson MD on January 18, 2017 10:57 CST  Encounter info:???????  [id number], [facility], Outpatient, 1/17/2017 - 1/17/2017 [1]     [1]?MG Screening; Haley Schaefer 01/17/2017 10:35 CST

## 2022-05-20 ENCOUNTER — HOSPITAL ENCOUNTER (EMERGENCY)
Facility: HOSPITAL | Age: 67
Discharge: HOME | End: 2022-05-20
Attending: STUDENT IN AN ORGANIZED HEALTH CARE EDUCATION/TRAINING PROGRAM
Payer: MEDICARE

## 2022-05-20 VITALS
TEMPERATURE: 98 F | SYSTOLIC BLOOD PRESSURE: 150 MMHG | DIASTOLIC BLOOD PRESSURE: 114 MMHG | HEART RATE: 75 BPM | RESPIRATION RATE: 16 BRPM | OXYGEN SATURATION: 100 %

## 2022-05-20 DIAGNOSIS — Z95.0 PACEMAKER: ICD-10-CM

## 2022-05-20 DIAGNOSIS — T82.9XXA PACEMAKER COMPLICATIONS: ICD-10-CM

## 2022-05-20 DIAGNOSIS — T82.9XXA DISORDER OF CARDIAC PACEMAKER SYSTEM, INITIAL ENCOUNTER: Primary | ICD-10-CM

## 2022-05-20 LAB
ALBUMIN SERPL-MCNC: 4 GM/DL (ref 3.4–4.8)
ALBUMIN/GLOB SERPL: 1.4 RATIO (ref 1.1–2)
ALP SERPL-CCNC: 112 UNIT/L (ref 40–150)
ALT SERPL-CCNC: 10 UNIT/L (ref 0–55)
APTT PPP: 32.3 SECONDS (ref 23.2–33.7)
AST SERPL-CCNC: 12 UNIT/L (ref 5–34)
BASOPHILS # BLD AUTO: 0.05 X10(3)/MCL (ref 0–0.2)
BASOPHILS NFR BLD AUTO: 0.9 %
BILIRUBIN DIRECT+TOT PNL SERPL-MCNC: 0.4 MG/DL
BNP BLD-MCNC: 242.7 PG/ML
BUN SERPL-MCNC: 14.7 MG/DL (ref 9.8–20.1)
CALCIUM SERPL-MCNC: 9.1 MG/DL (ref 8.4–10.2)
CHLORIDE SERPL-SCNC: 106 MMOL/L (ref 98–107)
CO2 SERPL-SCNC: 28 MMOL/L (ref 23–31)
CREAT SERPL-MCNC: 1.28 MG/DL (ref 0.55–1.02)
EOSINOPHIL # BLD AUTO: 0.39 X10(3)/MCL (ref 0–0.9)
EOSINOPHIL NFR BLD AUTO: 7.1 %
ERYTHROCYTE [DISTWIDTH] IN BLOOD BY AUTOMATED COUNT: 13.2 % (ref 11.5–17)
GLOBULIN SER-MCNC: 2.9 GM/DL (ref 2.4–3.5)
GLUCOSE SERPL-MCNC: 124 MG/DL (ref 82–115)
HCT VFR BLD AUTO: 37.7 % (ref 37–47)
HGB BLD-MCNC: 11.8 GM/DL (ref 12–16)
IMM GRANULOCYTES # BLD AUTO: 0.02 X10(3)/MCL (ref 0–0.02)
IMM GRANULOCYTES NFR BLD AUTO: 0.4 % (ref 0–0.43)
INR BLD: 1.1 (ref 0–1.3)
LYMPHOCYTES # BLD AUTO: 1.9 X10(3)/MCL (ref 0.6–4.6)
LYMPHOCYTES NFR BLD AUTO: 34.7 %
MAGNESIUM SERPL-MCNC: 2.2 MG/DL (ref 1.6–2.6)
MCH RBC QN AUTO: 28.3 PG (ref 27–31)
MCHC RBC AUTO-ENTMCNC: 31.3 MG/DL (ref 33–36)
MCV RBC AUTO: 90.4 FL (ref 80–94)
MONOCYTES # BLD AUTO: 0.33 X10(3)/MCL (ref 0.1–1.3)
MONOCYTES NFR BLD AUTO: 6 %
NEUTROPHILS # BLD AUTO: 2.8 X10(3)/MCL (ref 2.1–9.2)
NEUTROPHILS NFR BLD AUTO: 50.9 %
NRBC BLD AUTO-RTO: 0 %
PLATELET # BLD AUTO: 155 X10(3)/MCL (ref 130–400)
PMV BLD AUTO: 11.1 FL (ref 9.4–12.4)
POTASSIUM SERPL-SCNC: 3.9 MMOL/L (ref 3.5–5.1)
PROT SERPL-MCNC: 6.9 GM/DL (ref 5.8–7.6)
PROTHROMBIN TIME: 13.9 SECONDS (ref 12.5–14.5)
RBC # BLD AUTO: 4.17 X10(6)/MCL (ref 4.2–5.4)
SODIUM SERPL-SCNC: 141 MMOL/L (ref 136–145)
TROPONIN I SERPL-MCNC: <0.01 NG/ML (ref 0–0.04)
WBC # SPEC AUTO: 5.5 X10(3)/MCL (ref 4.5–11.5)

## 2022-05-20 PROCEDURE — 36415 COLL VENOUS BLD VENIPUNCTURE: CPT | Performed by: STUDENT IN AN ORGANIZED HEALTH CARE EDUCATION/TRAINING PROGRAM

## 2022-05-20 PROCEDURE — 99285 EMERGENCY DEPT VISIT HI MDM: CPT | Mod: 25

## 2022-05-20 PROCEDURE — 93005 ELECTROCARDIOGRAM TRACING: CPT

## 2022-05-20 PROCEDURE — 93010 ELECTROCARDIOGRAM REPORT: CPT | Mod: ,,, | Performed by: INTERNAL MEDICINE

## 2022-05-20 PROCEDURE — 85730 THROMBOPLASTIN TIME PARTIAL: CPT | Performed by: STUDENT IN AN ORGANIZED HEALTH CARE EDUCATION/TRAINING PROGRAM

## 2022-05-20 PROCEDURE — 83880 ASSAY OF NATRIURETIC PEPTIDE: CPT | Performed by: STUDENT IN AN ORGANIZED HEALTH CARE EDUCATION/TRAINING PROGRAM

## 2022-05-20 PROCEDURE — 84484 ASSAY OF TROPONIN QUANT: CPT | Performed by: STUDENT IN AN ORGANIZED HEALTH CARE EDUCATION/TRAINING PROGRAM

## 2022-05-20 PROCEDURE — 85610 PROTHROMBIN TIME: CPT | Performed by: STUDENT IN AN ORGANIZED HEALTH CARE EDUCATION/TRAINING PROGRAM

## 2022-05-20 PROCEDURE — 85025 COMPLETE CBC W/AUTO DIFF WBC: CPT | Performed by: STUDENT IN AN ORGANIZED HEALTH CARE EDUCATION/TRAINING PROGRAM

## 2022-05-20 PROCEDURE — 93010 EKG 12-LEAD: ICD-10-PCS | Mod: ,,, | Performed by: INTERNAL MEDICINE

## 2022-05-20 PROCEDURE — 83735 ASSAY OF MAGNESIUM: CPT | Performed by: STUDENT IN AN ORGANIZED HEALTH CARE EDUCATION/TRAINING PROGRAM

## 2022-05-20 PROCEDURE — 80053 COMPREHEN METABOLIC PANEL: CPT | Performed by: STUDENT IN AN ORGANIZED HEALTH CARE EDUCATION/TRAINING PROGRAM

## 2022-05-20 NOTE — ED PROVIDER NOTES
Encounter Date: 5/20/2022    SCRIBE #1 NOTE: I, Steven Bhatia, am scribing for, and in the presence of,  Dr. Maxwell. I have scribed the following portions of the note - the EKG reading. Other sections scribed: HPI, ROS, Physical Exam, MDM, Attending.       History     Chief Complaint   Patient presents with    Pacemaker Problem     Pt states since the afternoon her pacemaker has been beeping intermittently, htn on arrival, denies cp     66 y/o AAF presents to ED via EMS stating that her pacemaker has been beeping intermittently since yesterday while she was shopping.  She says that it last beeped just prior to EMS picking her up.  It is a [employer] pacemaker.  Pt says that this has never happened before.  Her last appointment with Dr. Oscar Raymond, her cardiologist, was earlier this month.  She says that she had a pace check after the appointment, during which changes were made to the pacemaker.    The history is provided by the patient.   General Illness   The current episode started yesterday. The problem occurs occasionally. The pain is at a severity of 0/10. Nothing relieves the symptoms. Nothing aggravates the symptoms. Pertinent negatives include no fever, no abdominal pain, no sore throat, no shortness of breath and no rash. Recently, medical care has been given by a specialist.     Review of patient's allergies indicates:   Allergen Reactions    Ketorolac      Other reaction(s): Tongue finding  slurred speech    Penicillins Hives    Cholecalciferol (vitamin d3) Edema     Other reaction(s): Hand and joint of hands swelling     Past Medical History:   Diagnosis Date    SRUTHI positive     CAD (coronary artery disease)     CKD (chronic kidney disease)     Degenerative joint disease     Fibromyalgia     Glomus tympanicum tumor     Hepatitis C     Hypertension     ICD (implantable cardioverter-defibrillator) in place     Left sided sciatica     Noncompliance with medication regimen     Nonischemic  cardiomyopathy     Seborrheic keratoses     Tinea corporis     Undifferentiated connective tissue disease     Ventricular fibrillation     Vitamin D deficiency      Past Surgical History:   Procedure Laterality Date    CARDIAC DEFIBRILLATOR PLACEMENT      DERMOID CYST  EXCISION      HYSTERECTOMY       Family History   Problem Relation Age of Onset    Hypertension Mother     Hypertension Father     Kidney failure Father     Diabetes Sister      Social History     Tobacco Use    Smoking status: Never Smoker    Smokeless tobacco: Never Used     Review of Systems   Constitutional: Negative for fever.   HENT: Negative for sore throat.    Eyes: Negative for visual disturbance.   Respiratory: Negative for shortness of breath.    Cardiovascular: Negative for chest pain.   Gastrointestinal: Negative for abdominal pain.   Genitourinary: Negative for dysuria.   Musculoskeletal: Negative for joint swelling.   Skin: Negative for rash.   Neurological: Negative for weakness.   Psychiatric/Behavioral: Negative for confusion.       Physical Exam     Initial Vitals [05/20/22 0057]   BP Pulse Resp Temp SpO2   (!) 196/115 85 16 98.4 °F (36.9 °C) 100 %      MAP       --         Physical Exam    Nursing note and vitals reviewed.  Constitutional: She appears well-developed and well-nourished.   HENT:   Head: Normocephalic and atraumatic.   Eyes: EOM are normal. Pupils are equal, round, and reactive to light.   Neck:   Normal range of motion.  Cardiovascular: Normal rate, regular rhythm, normal heart sounds and intact distal pulses.   No murmur heard.  Pulmonary/Chest: Breath sounds normal. No respiratory distress. She has no wheezes. She has no rales.   Pacemaker LCW   Abdominal: Abdomen is soft. She exhibits no distension. There is no abdominal tenderness. There is no rebound.   Musculoskeletal:         General: No tenderness or edema. Normal range of motion.      Cervical back: Normal range of motion.     Neurological: She  is alert. She has normal strength. No cranial nerve deficit. GCS score is 15. GCS eye subscore is 4. GCS verbal subscore is 5. GCS motor subscore is 6.   Skin: Skin is warm and dry. Capillary refill takes less than 2 seconds. No rash noted. No erythema.   Psychiatric: She has a normal mood and affect.         ED Course   Procedures  Labs Reviewed   COMPREHENSIVE METABOLIC PANEL - Abnormal; Notable for the following components:       Result Value    Glucose Level 124 (*)     Creatinine 1.28 (*)     All other components within normal limits   B-TYPE NATRIURETIC PEPTIDE - Abnormal; Notable for the following components:    Natriuretic Peptide 242.7 (*)     All other components within normal limits   CBC WITH DIFFERENTIAL - Abnormal; Notable for the following components:    RBC 4.17 (*)     Hgb 11.8 (*)     MCHC 31.3 (*)     IG# 0.02 (*)     All other components within normal limits   TROPONIN I - Normal   MAGNESIUM - Normal   PROTIME-INR - Normal   APTT - Normal   CBC W/ AUTO DIFFERENTIAL    Narrative:     The following orders were created for panel order CBC auto differential.  Procedure                               Abnormality         Status                     ---------                               -----------         ------                     CBC with Differential[[id number]]        Abnormal            Final result                 Please view results for these tests on the individual orders.     EKG Readings: (Independently Interpreted)   Initial Reading: No STEMI. Rhythm: Normal Sinus Rhythm. Heart Rate: 72. Ectopy: PVCs Frequent. Conduction: Normal. ST Segments: Normal ST Segments. T Waves: Normal. Axis: Normal.   0106   Other EKG Interpretations: 2nd EKG at 0425 - rate 63 with frequent PVC's.  No STEMI.     ECG Results          EKG 12-lead (Final result)  Result time 05/20/22 22:43:48    Final result by Interface, Lab In [facility] (05/20/22 22:43:48)                 Narrative:    Test Reason : T82.9XXA,    Vent.  Rate : 063 BPM     Atrial Rate : 063 BPM     P-R Int : 156 ms          QRS Dur : 076 ms      QT Int : 442 ms       P-R-T Axes : 076 060 070 degrees     QTc Int : 452 ms    Sinus rhythm with frequent Premature ventricular complexes  Otherwise normal ECG  When compared with ECG of 20-MAY-2022 01:06,  No significant change was found  Confirmed by Rafa Pendleton MD ([id number]) on 5/20/2022 10:43:43 PM    Referred By: FERMIN   SELF           Confirmed By:Rafa Pendleton MD                             EKG 12-lead (Final result)  Result time 05/20/22 22:42:31    Final result by Interface, Lab In [facility] (05/20/22 22:42:31)                 Narrative:    Test Reason : T82.9XXA,    Vent. Rate : 072 BPM     Atrial Rate : 072 BPM     P-R Int : 152 ms          QRS Dur : 086 ms      QT Int : 418 ms       P-R-T Axes : 070 059 072 degrees     QTc Int : 457 ms    Sinus rhythm with frequent and consecutive Premature ventricular complexes  Nonspecific ST abnormality  Abnormal ECG  No previous ECGs available  Confirmed by Rafa Pendleton MD ([id number]) on 5/20/2022 10:42:18 PM    Referred By: [email or web address]   SELF           Confirmed By:Rafa Pendleton MD                            Imaging Results          X-Ray Chest 1 View (Final result)  Result time 05/20/22 06:37:32    Final result by Brian Weber MD (05/20/22 06:37:32)                 Impression:      No acute cardiopulmonary process.      Electronically signed by: Brian Weber  Date:    05/20/2022  Time:    06:37             Narrative:    EXAMINATION:  XR CHEST 1 VIEW    CLINICAL HISTORY:  Presence of cardiac pacemaker    TECHNIQUE:  Single view of the chest    COMPARISON:  No prior imaging available for comparison.    FINDINGS:  No focal opacification, pleural effusion, or pneumothorax.    The cardiomediastinal silhouette is within normal limits.  Left-sided cardiac device is noted.    No acute osseous abnormality.                                 Medications - No data to display  Medical  Decision Making:   Clinical Tests:   Lab Tests: Ordered and Reviewed  Medical Tests: Ordered and Reviewed    Patient is a 68 y/o female presents for pacemaker issue.  See HPI/exam.  Interrogated, discussed with rep; discussed with cardiology.  Reassessed patient.  Patient is resting comfortably.  Discussed all results.  Discussed need for follow-up.  Discussed return precautions.  Answered all questions at this time.  Hemodynamically stable for continued outpatient management with strict return precautions.  Patient and family verbalized understanding agreed to plan.            Scribe Attestation:   Scribe #1: I performed the above scribed service and the documentation accurately describes the services I performed. I attest to the accuracy of the note.        ED Course as of 05/28/22 1043   Fri May 20, 2022   0151 Discussed with Rosalino Haque, with [employer]. Potential lead issue.   Lead integrity issue, short v-v internal, fast events, may deliver shock inappropriately. Increase in jump in impedence.  Recommends consultation with Dr. Oscar Raymond.  First time it has happened.  [RP]   0500 Discussed with Dr. Oscar Raymond.  F/u in clinic on Monday.   [RP]      ED Course User Index  [RP] Jose Maxwell MD             Clinical Impression:   Final diagnoses:  [T82.9XXA] Disorder of cardiac pacemaker system, initial encounter (Primary)  [Z95.0] Pacemaker          ED Disposition Condition    Discharge Stable        ED Prescriptions     None        Follow-up Information     Follow up With Specialties Details Why Contact Noam Raymond DO Cardiology   [address] [place] [address]  [phone]             Jose Maxwell MD  05/28/22 1043

## 2022-05-20 NOTE — DISCHARGE INSTRUCTIONS
Follow up with your cardiologist.      Return to the emergency if you have any chest pain, shortness of breath, nausea, vomiting, pacemaker alarming, or any other symptoms.

## 2022-05-27 ENCOUNTER — OFFICE VISIT (OUTPATIENT)
Dept: FAMILY MEDICINE | Facility: CLINIC | Age: 67
End: 2022-05-27
Payer: MEDICARE

## 2022-05-27 VITALS
HEIGHT: 72 IN | SYSTOLIC BLOOD PRESSURE: 127 MMHG | OXYGEN SATURATION: 99 % | HEART RATE: 66 BPM | WEIGHT: 271 LBS | DIASTOLIC BLOOD PRESSURE: 82 MMHG | RESPIRATION RATE: 18 BRPM | BODY MASS INDEX: 36.7 KG/M2 | TEMPERATURE: 98 F

## 2022-05-27 DIAGNOSIS — Z97.2 ILL-FITTING DENTURES: ICD-10-CM

## 2022-05-27 DIAGNOSIS — I10 PRIMARY HYPERTENSION: Primary | ICD-10-CM

## 2022-05-27 DIAGNOSIS — I25.10 CORONARY ARTERY DISEASE INVOLVING NATIVE CORONARY ARTERY OF NATIVE HEART WITHOUT ANGINA PECTORIS: ICD-10-CM

## 2022-05-27 DIAGNOSIS — B00.52 HERPES KERATITIS OF LEFT EYE: ICD-10-CM

## 2022-05-27 DIAGNOSIS — K08.89 ILL-FITTING DENTURES: ICD-10-CM

## 2022-05-27 PROBLEM — M79.7 FIBROMYALGIA: Status: ACTIVE | Noted: 2022-05-27

## 2022-05-27 PROBLEM — Z95.810 IMPLANTABLE CARDIOVERTER-DEFIBRILLATOR (ICD) IN SITU: Status: ACTIVE | Noted: 2022-05-27

## 2022-05-27 PROBLEM — M54.30 SCIATICA: Status: ACTIVE | Noted: 2022-05-27

## 2022-05-27 PROBLEM — H93.19 TINNITUS: Status: ACTIVE | Noted: 2022-05-27

## 2022-05-27 PROBLEM — Z86.79 H/O VENTRICULAR TACHYCARDIA: Status: ACTIVE | Noted: 2022-05-27

## 2022-05-27 PROBLEM — I42.9 CARDIOMYOPATHY: Status: ACTIVE | Noted: 2022-05-27

## 2022-05-27 PROBLEM — D18.09 GLOMUS TUMOR OF MIDDLE EAR: Status: ACTIVE | Noted: 2022-05-27

## 2022-05-27 PROBLEM — M35.9 UNDIFFERENTIATED CONNECTIVE TISSUE DISEASE: Status: ACTIVE | Noted: 2022-05-27

## 2022-05-27 PROBLEM — E66.9 OBESITY: Status: ACTIVE | Noted: 2022-05-27

## 2022-05-27 PROBLEM — B35.4 TINEA CORPORIS: Status: ACTIVE | Noted: 2022-05-27

## 2022-05-27 PROBLEM — L82.1 SEBORRHEIC KERATOSIS: Status: ACTIVE | Noted: 2022-05-27

## 2022-05-27 PROBLEM — M79.89 SWELLING OF HAND: Status: ACTIVE | Noted: 2022-05-27

## 2022-05-27 PROBLEM — B19.20 HEPATITIS C VIRUS INFECTION: Status: ACTIVE | Noted: 2022-05-27

## 2022-05-27 PROBLEM — E55.9 VITAMIN D DEFICIENCY: Status: ACTIVE | Noted: 2022-05-27

## 2022-05-27 PROBLEM — R76.8 ELEVATED RHEUMATOID FACTOR: Status: ACTIVE | Noted: 2022-05-27

## 2022-05-27 PROBLEM — N18.2 STAGE 2 CHRONIC KIDNEY DISEASE: Status: ACTIVE | Noted: 2022-05-27

## 2022-05-27 PROBLEM — I49.9 IRREGULAR HEART RHYTHM: Status: ACTIVE | Noted: 2022-05-27

## 2022-05-27 PROBLEM — M79.673 PAIN OF FOOT: Status: ACTIVE | Noted: 2022-05-27

## 2022-05-27 PROBLEM — R76.8 POSITIVE ANTINUCLEAR ANTIBODY: Status: ACTIVE | Noted: 2022-05-27

## 2022-05-27 PROCEDURE — 99215 PR OFFICE/OUTPT VISIT, EST, LEVL V, 40-54 MIN: ICD-10-PCS | Mod: S$PBB,,, | Performed by: FAMILY MEDICINE

## 2022-05-27 PROCEDURE — 99215 OFFICE O/P EST HI 40 MIN: CPT | Mod: PBBFAC | Performed by: FAMILY MEDICINE

## 2022-05-27 PROCEDURE — 99215 OFFICE O/P EST HI 40 MIN: CPT | Mod: S$PBB,,, | Performed by: FAMILY MEDICINE

## 2022-05-27 RX ORDER — CYCLOBENZAPRINE HCL 10 MG
10 TABLET ORAL NIGHTLY
COMMUNITY
Start: 2021-06-28 | End: 2022-09-08 | Stop reason: SINTOL

## 2022-05-27 RX ORDER — NITROGLYCERIN 0.4 MG/1
0.4 TABLET SUBLINGUAL EVERY 5 MIN PRN
Qty: 100 TABLET | Refills: 2 | Status: SHIPPED | OUTPATIENT
Start: 2022-05-27 | End: 2023-09-27

## 2022-05-27 RX ORDER — PREDNISOLONE ACETATE 10 MG/ML
1 SUSPENSION/ DROPS OPHTHALMIC 4 TIMES DAILY
COMMUNITY
Start: 2021-09-16 | End: 2022-09-01

## 2022-05-27 RX ORDER — HYDROXYCHLOROQUINE SULFATE 200 MG/1
200 TABLET, FILM COATED ORAL 2 TIMES DAILY
COMMUNITY
Start: 2021-06-28 | End: 2022-09-08

## 2022-05-27 RX ORDER — LANOLIN ALCOHOL/MO/W.PET/CERES
400 CREAM (GRAM) TOPICAL 2 TIMES DAILY
Status: ON HOLD | COMMUNITY
Start: 2022-01-14 | End: 2022-09-12 | Stop reason: CLARIF

## 2022-05-27 RX ORDER — ACYCLOVIR 800 MG/1
800 TABLET ORAL
Status: ON HOLD | COMMUNITY
Start: 2022-05-26 | End: 2022-09-12 | Stop reason: CLARIF

## 2022-05-27 RX ORDER — SACUBITRIL AND VALSARTAN 24; 26 MG/1; MG/1
1 TABLET, FILM COATED ORAL 2 TIMES DAILY
COMMUNITY
Start: 2022-05-02 | End: 2023-09-08

## 2022-05-27 RX ORDER — CARVEDILOL 12.5 MG/1
1 TABLET ORAL 2 TIMES DAILY
COMMUNITY
Start: 2021-11-15 | End: 2022-06-01 | Stop reason: HOSPADM

## 2022-05-27 RX ORDER — GANCICLOVIR 1.5 MG/G
1 GEL OPHTHALMIC
COMMUNITY
Start: 2022-05-25 | End: 2022-09-01

## 2022-05-27 NOTE — LETTER
Documentation for Travel     Patient: Jacqueline Miller  YOB: 1955  Date: 05/27/2022     To Whom It May Concern:     Per the guidance of the Center for Disease Control, the [place] State Department, and World Health Organization,  patients with immunocompromised systems and chronic disease should not travel during this time. The risk of COVID-19 is increased on long plane rides and/or masses of people in close proximity.    As the COVID-19 virus continues to spread throughout the [place], including [place], patients are encouraged to avoid large crowds of people, avoid non-essential travel, and embarking cruise trips.      If you have any questions or concerns, or if I can be of further assistance, please do not hesitate to contact me.     Sincerely,     Jai Rosado MD

## 2022-05-27 NOTE — PROGRESS NOTES
"[facility]  [address] [address]  Phone: [phone]   Subjective:      Patient ID: Jacqueline Miller is a 67 y.o. female.    Chief Complaint: Medication Concerns+    Problem List Items Addressed This Visit        Ophtho    Herpes keratitis of left eye    Overview     Pt has left herpes keratitis. Followed by Dr. Crain in Ophthalmology clinic. Spoke with Dr. Crain yesterday about pt treatment. Pt was hesitant to take Acyclovir 800 mg po 5 times a day. After talking with the patient on speaker phoen on 5/26/2022 with Dr. Crain present, treatment was discussed and pt agreed to take acyclovir for left herpes keratitis.              ENT    Ill-fitting dentures    Overview     Patient had dentures designed in Nov 2020 paid by Medicare.  The patient told the venders that both top and bottom dentures were ill fitting- mandibular denture was too short in length and tight on bottom left  Mandible causing deterioration of the dental gingiva (per pt) and maxillary dentures- too big. Pt was told to take the dentures home for a week to see if it adjusted and she was told to put in powder to fix the slippage from the top dentures and to aid in affixing the  Lower dentures. The patient brought the dentures back a week later  But office was closed due to COVID -19 pandemic. Pt drove by 2 weeks later but dental office was closed. She was finally able to return  Dentures on 3/2/2020. The  wrote on the billing slip "  I, Juan Cruz, took dentures from patient because patient was not satisfied.  We will return on monies  as directly to [facility]. " Documented on the patient informed consent a g and consent for treatment plan with planned surface inflammation in billing is a signature Romeo Juan Cruz. Pt states she went to another vendor but vendor refused to service her for new dentures stating she had dentures paid for by the CMS system within 96 months which was " not accurate because the funds were returned per Mr. Cruz. Pt has been denied dentures by CMS because of the assumption that the patient has dentures but she does not have the dentures and the monies were supposed to be refunded by the initial vendor. Pt states the initial vendor filed down her old pair of dentures she purchased years ago to match their dentures which is now ill fitted. Pt states she has pain in the mandible left side. She states the left manibular area is deteriorating due to the filing down of her older dentures. She states she cannot chew on left mandibular side because of pain. She wears  Older dentures on bottom only to talk the she takes them off. Her older top dentures fits. She has had to modify her diet to soft foods and cook meats down to pureed like consistency- she cannot eat salads and other foods. Pt    request appeal for dentures and will need referral to periodontitis.     Pt signed medical release today to release this note and any information pertaining to this diagnosis for appeal to CMS/ Medicare and medicaid.   Pt verbally agrees to have an appeals letter faxed on her  behalf to the appeals fax number detailed in the denial letter.               Cardiac/Vascular    Hypertension - Primary    CAD (coronary artery disease)    Relevant Medications    nitroGLYCERIN (NITROSTAT) 0.4 MG SL tablet          The patient's Health Maintenance was reviewed and the following appears to be due:   Health Maintenance Due   Topic Date Due    Hepatitis C Screening  Never done    TETANUS VACCINE  10/27/1978    DEXA Scan  Never done    Colorectal Cancer Screening  Never done    Shingles Vaccine (1 of 2) Never done    Pneumococcal Vaccines (Age 65+) (1 - PCV) Never done    COVID-19 Vaccine (4 - Booster for Moderna series) 05/24/2022       (PHQ-2 data if performed)  Depression Patient Health Questionnaire 5/27/2022   Over the last two weeks how often have you been bothered by little interest  or pleasure in doing things 0   Over the last two weeks how often have you been bothered by feeling down, depressed or hopeless 0   PHQ-2 Total Score 0     (PHQ-9 data if performed)  No flowsheet data found.  (MINA data if performed)  No flowsheet data found.     Past Medical History:  Past Medical History:   Diagnosis Date    SRUTHI positive     CAD (coronary artery disease)     CKD (chronic kidney disease)     Degenerative joint disease     Fibromyalgia     Glomus tympanicum tumor     Hepatitis C     Hypertension     ICD (implantable cardioverter-defibrillator) in place     Left sided sciatica     Noncompliance with medication regimen     Nonischemic cardiomyopathy     Seborrheic keratoses     Tinea corporis     Undifferentiated connective tissue disease     Ventricular fibrillation     Vitamin D deficiency      Past Surgical History:   Procedure Laterality Date    CARDIAC DEFIBRILLATOR PLACEMENT      DERMOID CYST  EXCISION      HYSTERECTOMY       Review of patient's allergies indicates:   Allergen Reactions    Ketorolac      Other reaction(s): Tongue finding  slurred speech    Penicillins Hives    Cholecalciferol (vitamin d3) Edema     Other reaction(s): Hand and joint of hands swelling     Current Outpatient Medications on File Prior to Visit   Medication Sig Dispense Refill    acyclovir (ZOVIRAX) 800 MG Tab Take 800 mg by mouth 5 (five) times daily.      aspirin (ECOTRIN) 81 MG EC tablet Take 1 tablet by mouth once daily 90 tablet 0    carvediloL (COREG) 12.5 MG tablet Take 1 tablet by mouth twice daily 180 tablet 0    carvediloL (COREG) 12.5 MG tablet Take 1 tablet (12.5 mg total) by mouth 2 (two) times daily with meals. 60 tablet 11    carvediloL (COREG) 12.5 MG tablet Take 1 tablet by mouth 2 (two) times a day.      cyclobenzaprine (FLEXERIL) 10 MG tablet Take 10 mg by mouth every evening.      ENTRESTO 24-26 mg per tablet Take 1 tablet by mouth 2 (two) times daily.       hydrOXYchloroQUINE (PLAQUENIL) 200 mg tablet Take 200 mg by mouth 2 (two) times a day.      magnesium oxide (MAG-OX) 400 mg (241.3 mg magnesium) tablet Take 400 mg by mouth 2 (two) times a day.      prednisoLONE acetate (PRED FORTE) 1 % DrpS Apply 1 drop to eye 4 (four) times daily.      ZIRGAN 0.15 % Gel Place 1 drop into the left eye 5 (five) times daily.       No current facility-administered medications on file prior to visit.     Social History     Socioeconomic History    Marital status:    Tobacco Use    Smoking status: Never Smoker    Smokeless tobacco: Never Used     Family History   Problem Relation Age of Onset    Hypertension Mother     Hypertension Father     Kidney failure Father     Diabetes Sister        Review of Systems   Constitutional: Negative for chills, fatigue and fever.   HENT: Positive for dental problem. Negative for congestion, hearing loss, rhinorrhea, sore throat and voice change.         Gingival swelling and erosion reported.   Eyes: Negative for visual disturbance.   Respiratory: Negative for cough, shortness of breath and wheezing.    Cardiovascular: Negative for chest pain, palpitations and leg swelling.   Gastrointestinal: Negative for abdominal pain, blood in stool, constipation, diarrhea, nausea and vomiting.   Endocrine: Negative for cold intolerance, heat intolerance, polydipsia, polyphagia and polyuria.   Genitourinary: Negative for decreased urine volume, difficulty urinating, flank pain, frequency, hematuria and urgency.   Musculoskeletal: Negative for arthralgias, gait problem, joint swelling and myalgias.   Skin: Negative for rash.   Neurological: Negative for dizziness, seizures, speech difficulty, weakness, numbness and headaches.   Hematological: Negative for adenopathy.   Psychiatric/Behavioral: Negative.  Negative for decreased concentration, dysphoric mood, hallucinations and suicidal ideas. The patient is not nervous/anxious.    All other systems  reviewed and are negative.      Objective:   /82 (BP Location: Left arm, Patient Position: Sitting)   Pulse 66   Temp 98.2 °F (36.8 °C) (Oral)   Resp 18   Ht 6' (1.829 m)   Wt 122.9 kg (271 lb)   SpO2 99%   BMI 36.75 kg/m²   Physical Exam  Vitals and nursing note reviewed.   Constitutional:       General: She is not in acute distress.     Appearance: Normal appearance. She is normal weight. She is not ill-appearing.   HENT:      Head: Normocephalic and atraumatic.      Mouth/Throat:      Comments: Due to COVID -19 pandemic oral and nasal exams not conducted. Will refer to periodontist.   Eyes:      Extraocular Movements: Extraocular movements intact.      Conjunctiva/sclera: Conjunctivae normal.      Pupils: Pupils are equal, round, and reactive to light.      Comments: Left eye with halo type affect around the pupil from herpes keratitis   Cardiovascular:      Rate and Rhythm: Normal rate and regular rhythm.      Pulses: Normal pulses.      Heart sounds: Normal heart sounds. No murmur heard.    No friction rub. No gallop.   Pulmonary:      Effort: Pulmonary effort is normal. No respiratory distress.      Breath sounds: Normal breath sounds. No wheezing, rhonchi or rales.   Abdominal:      General: Abdomen is flat. Bowel sounds are normal.      Palpations: Abdomen is soft. There is no mass.      Tenderness: There is no abdominal tenderness. There is no guarding or rebound.      Hernia: No hernia is present.   Musculoskeletal:         General: No swelling. Normal range of motion.      Cervical back: Normal range of motion and neck supple.      Right lower leg: No edema.      Left lower leg: No edema.   Skin:     General: Skin is warm and dry.      Capillary Refill: Capillary refill takes less than 2 seconds.      Findings: No lesion or rash.   Neurological:      General: No focal deficit present.      Mental Status: She is alert and oriented to person, place, and time. Mental status is at baseline.       Cranial Nerves: No cranial nerve deficit.      Sensory: No sensory deficit.      Gait: Gait normal.   Psychiatric:         Mood and Affect: Mood normal.         Behavior: Behavior normal.         Thought Content: Thought content normal.         Judgment: Judgment normal.      Discussed labs with the patient.  Admission on 05/20/2022, Discharged on 05/20/2022   Component Date Value Ref Range Status    Sodium Level 05/20/2022 141  136 - 145 mmol/L Final    Potassium Level 05/20/2022 3.9  3.5 - 5.1 mmol/L Final    Chloride 05/20/2022 106  98 - 107 mmol/L Final    Carbon Dioxide 05/20/2022 28  23 - 31 mmol/L Final    Glucose Level 05/20/2022 124 (A) 82 - 115 mg/dL Final    Blood Urea Nitrogen 05/20/2022 14.7  9.8 - 20.1 mg/dL Final    Creatinine 05/20/2022 1.28 (A) 0.55 - 1.02 mg/dL Final    Calcium Level Total 05/20/2022 9.1  8.4 - 10.2 mg/dL Final    Protein Total 05/20/2022 6.9  5.8 - 7.6 gm/dL Final    Albumin Level 05/20/2022 4.0  3.4 - 4.8 gm/dL Final    Globulin 05/20/2022 2.9  2.4 - 3.5 gm/dL Final    Albumin/Globulin Ratio 05/20/2022 1.4  1.1 - 2.0 ratio Final    Bilirubin Total 05/20/2022 0.4  <=1.5 mg/dL Final    Alkaline Phosphatase 05/20/2022 112  40 - 150 unit/L Final    Alanine Aminotransferase 05/20/2022 10  0 - 55 unit/L Final    Aspartate Aminotransferase 05/20/2022 12  5 - 34 unit/L Final    Estimated GFR- 05/20/2022 54  mls/min/1.73/m2 Final    Natriuretic Peptide 05/20/2022 242.7 (A) <=100.0 pg/mL Final    Troponin-I 05/20/2022 <0.010  0.000 - 0.045 ng/mL Final    Magnesium Level 05/20/2022 2.20  1.60 - 2.60 mg/dL Final    PT 05/20/2022 13.9  12.5 - 14.5 seconds Final    INR 05/20/2022 1.10  0.00 - 1.30 Final    PTT 05/20/2022 32.3  23.2 - 33.7 seconds Final    For Minimal Heparin Infusion, the goal aPTT 64-85 seconds corresponds to an anti-Xa of 0.3-0.5.    For Low Intensity and High Intensity Heparin, the goal aPTT  seconds corresponds to an anti-Xa of  0.3-0.7    WBC 05/20/2022 5.5  4.5 - 11.5 x10(3)/mcL Final    RBC 05/20/2022 4.17 (A) 4.20 - 5.40 x10(6)/mcL Final    Hgb 05/20/2022 11.8 (A) 12.0 - 16.0 gm/dL Final    Hct 05/20/2022 37.7  37.0 - 47.0 % Final    MCV 05/20/2022 90.4  80.0 - 94.0 fL Final    MCH 05/20/2022 28.3  27.0 - 31.0 pg Final    MCHC 05/20/2022 31.3 (A) 33.0 - 36.0 mg/dL Final    RDW 05/20/2022 13.2  11.5 - 17.0 % Final    Platelet 05/20/2022 155  130 - 400 x10(3)/mcL Final    MPV 05/20/2022 11.1  9.4 - 12.4 fL Final    Neut % 05/20/2022 50.9  % Final    Lymph % 05/20/2022 34.7  % Final    Mono % 05/20/2022 6.0  % Final    Eos % 05/20/2022 7.1  % Final    Basophil % 05/20/2022 0.9  % Final    Lymph # 05/20/2022 1.90  0.6 - 4.6 x10(3)/mcL Final    Neut # 05/20/2022 2.8  2.1 - 9.2 x10(3)/mcL Final    Mono # 05/20/2022 0.33  0.1 - 1.3 x10(3)/mcL Final    Eos # 05/20/2022 0.39  0 - 0.9 x10(3)/mcL Final    Baso # 05/20/2022 0.05  0 - 0.2 x10(3)/mcL Final    IG# 05/20/2022 0.02 (A) 0 - 0.0155 x10(3)/mcL Final    IG% 05/20/2022 0.4  0 - 0.43 % Final    NRBC% 05/20/2022 0.0  % Final      Assessment:     1. Primary hypertension    2. Ill-fitting dentures    3. Herpes keratitis of left eye    4. Coronary artery disease involving native coronary artery of native heart without angina pectoris      Plan:   I am having Jacqueline Miller start on nitroGLYCERIN. I am also having her maintain her carvediloL, carvediloL, aspirin, acyclovir, cyclobenzaprine, ZIRGAN, hydrOXYchloroQUINE, prednisoLONE acetate, magnesium oxide, ENTRESTO, and carvediloL.  Problem List Items Addressed This Visit        Ophtho    Herpes keratitis of left eye  Follow up with Dr. Crain in [facility] Ophthalmology clinic  Pt agrees to take the acyclovir for the above as directed by [facility]       ENT    Ill-fitting dentures  Pt will need to appeal to medicare/medicaid for reimbursement from the company above for ill fitted dentures. Awaiting records from the  institution that made and fitted the dentures.       Cardiac/Vascular    Hypertension - Primary    CAD (coronary artery disease)    Relevant Medications    nitroGLYCERIN (NITROSTAT) 0.4 MG SL tablet  Follow up with Cardiologist, Dr. Latrell Vasquez          Medication List with Changes/Refills   New Medications    NITROGLYCERIN (NITROSTAT) 0.4 MG SL TABLET    Place 1 tablet (0.4 mg total) under the tongue every 5 (five) minutes as needed for Chest pain.   Current Medications    ACYCLOVIR (ZOVIRAX) 800 MG TAB    Take 800 mg by mouth 5 (five) times daily.    ASPIRIN (ECOTRIN) 81 MG EC TABLET    Take 1 tablet by mouth once daily    CARVEDILOL (COREG) 12.5 MG TABLET    Take 1 tablet by mouth twice daily    CARVEDILOL (COREG) 12.5 MG TABLET    Take 1 tablet (12.5 mg total) by mouth 2 (two) times daily with meals.    CARVEDILOL (COREG) 12.5 MG TABLET    Take 1 tablet by mouth 2 (two) times a day.    CYCLOBENZAPRINE (FLEXERIL) 10 MG TABLET    Take 10 mg by mouth every evening.    ENTRESTO 24-26 MG PER TABLET    Take 1 tablet by mouth 2 (two) times daily.    HYDROXYCHLOROQUINE (PLAQUENIL) 200 MG TABLET    Take 200 mg by mouth 2 (two) times a day.    MAGNESIUM OXIDE (MAG-OX) 400 MG (241.3 MG MAGNESIUM) TABLET    Take 400 mg by mouth 2 (two) times a day.    PREDNISOLONE ACETATE (PRED FORTE) 1 % DRPS    Apply 1 drop to eye 4 (four) times daily.    ZIRGAN 0.15 % GEL    Place 1 drop into the left eye 5 (five) times daily.       Previous medical history/lab work/radiology reviewed and considered during medical management decisions.   Medication list reviewed and medication reconciliation performed.  Patient was provided  and care about his/her current diagnosis (es) and medications including risk/benefit and side effects/adverse events, over the counter medication uses/doses, home self-care and contact precautions,  and red flags and indications for when to seek immediate medical attention.   Patient was advised to continue  compliance with current medication list and medical recommendations.  Recommended/ Advised continued compliance with recommended eating habits/ diets for medical conditions and exercise 150 minutes/ week (if possible) for medical condition (s).  Educational handouts and instructions on selected disease management in AVS (After Visit Summary).  All of the patient's questions were answered to patient's satisfaction.   The patient was receptive, expressed verbal understanding and agreement the above plan.      Portions of this encounter dictated using M*Modal Fluency Direct voice recognition software. Please excuse any typographical errors that might have transpired.      Follow up with  administrative management referral to Internal Medicine or Family Medicine (as of 7/8/2022) for  medical conditions above in 6 weeks (around 7/8/2022) for ill fittin  dentures.       I spent 54 minutes in preparing to see the patient, performing a medical interview/ history to collect relevant patient information pertaining to disease processes involving reviewing documentation from other specialists with: Management of the patient, performing a physical exam, counseling and educating the patient in detail about her disease process, discussed medications with potential side effects, ordering medications and tests for disease processes, independently interpreting results and discussing results with the patient and documentation of patient care regarding diagnosis, medications, side effects and patient education.  All the patient's questions were answered to the patient's satisfaction.              Jai Rosado MD

## 2022-05-28 ENCOUNTER — HOSPITAL ENCOUNTER (EMERGENCY)
Facility: HOSPITAL | Age: 67
Discharge: HOME | End: 2022-05-28
Attending: STUDENT IN AN ORGANIZED HEALTH CARE EDUCATION/TRAINING PROGRAM
Payer: MEDICARE

## 2022-05-28 VITALS
BODY MASS INDEX: 36.84 KG/M2 | TEMPERATURE: 99 F | HEART RATE: 60 BPM | HEIGHT: 72 IN | SYSTOLIC BLOOD PRESSURE: 122 MMHG | RESPIRATION RATE: 16 BRPM | WEIGHT: 272 LBS | OXYGEN SATURATION: 97 % | DIASTOLIC BLOOD PRESSURE: 67 MMHG

## 2022-05-28 DIAGNOSIS — R07.9 CHEST PAIN: ICD-10-CM

## 2022-05-28 DIAGNOSIS — T82.9XXA DISORDER OF CARDIAC PACEMAKER SYSTEM, INITIAL ENCOUNTER: Primary | ICD-10-CM

## 2022-05-28 LAB
ALBUMIN SERPL-MCNC: 4 GM/DL (ref 3.4–4.8)
ALBUMIN/GLOB SERPL: 1.2 RATIO (ref 1.1–2)
ALP SERPL-CCNC: 112 UNIT/L (ref 40–150)
ALT SERPL-CCNC: 9 UNIT/L (ref 0–55)
AST SERPL-CCNC: 11 UNIT/L (ref 5–34)
BASOPHILS # BLD AUTO: 0.05 X10(3)/MCL (ref 0–0.2)
BASOPHILS NFR BLD AUTO: 1 %
BILIRUBIN DIRECT+TOT PNL SERPL-MCNC: 0.4 MG/DL
BUN SERPL-MCNC: 12.5 MG/DL (ref 9.8–20.1)
CALCIUM SERPL-MCNC: 9.2 MG/DL (ref 8.4–10.2)
CHLORIDE SERPL-SCNC: 111 MMOL/L (ref 98–107)
CO2 SERPL-SCNC: 27 MMOL/L (ref 23–31)
CREAT SERPL-MCNC: 1.11 MG/DL (ref 0.55–1.02)
EOSINOPHIL # BLD AUTO: 0.35 X10(3)/MCL (ref 0–0.9)
EOSINOPHIL NFR BLD AUTO: 6.7 %
ERYTHROCYTE [DISTWIDTH] IN BLOOD BY AUTOMATED COUNT: 13.1 % (ref 11.5–17)
GLOBULIN SER-MCNC: 3.3 GM/DL (ref 2.4–3.5)
GLUCOSE SERPL-MCNC: 95 MG/DL (ref 82–115)
HCT VFR BLD AUTO: 41.7 % (ref 37–47)
HGB BLD-MCNC: 13.1 GM/DL (ref 12–16)
IMM GRANULOCYTES # BLD AUTO: 0.01 X10(3)/MCL (ref 0–0.02)
IMM GRANULOCYTES NFR BLD AUTO: 0.2 % (ref 0–0.43)
LYMPHOCYTES # BLD AUTO: 1.52 X10(3)/MCL (ref 0.6–4.6)
LYMPHOCYTES NFR BLD AUTO: 28.9 %
MCH RBC QN AUTO: 28.4 PG (ref 27–31)
MCHC RBC AUTO-ENTMCNC: 31.4 MG/DL (ref 33–36)
MCV RBC AUTO: 90.5 FL (ref 80–94)
MONOCYTES # BLD AUTO: 0.32 X10(3)/MCL (ref 0.1–1.3)
MONOCYTES NFR BLD AUTO: 6.1 %
NEUTROPHILS # BLD AUTO: 3 X10(3)/MCL (ref 2.1–9.2)
NEUTROPHILS NFR BLD AUTO: 57.1 %
NRBC BLD AUTO-RTO: 0 %
PLATELET # BLD AUTO: 170 X10(3)/MCL (ref 130–400)
PMV BLD AUTO: 11.1 FL (ref 9.4–12.4)
POTASSIUM SERPL-SCNC: 4.5 MMOL/L (ref 3.5–5.1)
PROT SERPL-MCNC: 7.3 GM/DL (ref 5.8–7.6)
RBC # BLD AUTO: 4.61 X10(6)/MCL (ref 4.2–5.4)
SODIUM SERPL-SCNC: 145 MMOL/L (ref 136–145)
TROPONIN I SERPL-MCNC: <0.01 NG/ML (ref 0–0.04)
WBC # SPEC AUTO: 5.3 X10(3)/MCL (ref 4.5–11.5)

## 2022-05-28 PROCEDURE — 99285 EMERGENCY DEPT VISIT HI MDM: CPT | Mod: 25

## 2022-05-28 PROCEDURE — 36415 COLL VENOUS BLD VENIPUNCTURE: CPT | Performed by: PHYSICIAN ASSISTANT

## 2022-05-28 PROCEDURE — 85025 COMPLETE CBC W/AUTO DIFF WBC: CPT | Performed by: PHYSICIAN ASSISTANT

## 2022-05-28 PROCEDURE — 84484 ASSAY OF TROPONIN QUANT: CPT | Performed by: PHYSICIAN ASSISTANT

## 2022-05-28 PROCEDURE — 80053 COMPREHEN METABOLIC PANEL: CPT | Performed by: PHYSICIAN ASSISTANT

## 2022-05-28 PROCEDURE — 93005 ELECTROCARDIOGRAM TRACING: CPT

## 2022-05-28 NOTE — DISCHARGE INSTRUCTIONS
Return to the emergency department if you develop worsening symptoms including: fever, chills, chest pain, shortness of breath, weakness, numbness, tingling, nausea, vomiting, inability to eat, drink, or take your medication.    Please keep your follow-up appointment with your cardiologist for further evaluation and management of your pacemaker beeping and lead problem.  Return emergency department if you have a shock, chest pain, shortness of breath.

## 2022-05-28 NOTE — ED PROVIDER NOTES
Encounter Date: 5/28/2022    SCRIBE #1 NOTE: I, Tabatha Mchugh, am scribing for, and in the presence of,  J Carlos Mehta MD. I have scribed the following portions of the note - the EKG reading. Other sections scribed: HPI, ROS, PE.       History     Chief Complaint   Patient presents with    Pacemaker Problem     C/o beeping from internal defibrillator/pacemaker. Did not shock. Pt denies any physical symptoms.     67 year old female with a history of CAD, CKD and HTN presents to ED because her defibrillator is beeping.  Pt says she has an appointment with her cardiologist, Dr. Oscar Raymond, on June 2nd.  Pt denies any CP or SOB.    The history is provided by the patient. No  was used.   General Illness   Illness onset: Pt does not have any physical complaints. Pertinent negatives include no fever, no abdominal pain, no constipation, no diarrhea, no nausea, no vomiting, no congestion, no ear discharge, no ear pain, no headaches, no rhinorrhea, no sore throat, no neck pain, no cough, no shortness of breath and no wheezing. Services received include tests performed.     Review of patient's allergies indicates:   Allergen Reactions    Ketorolac      Other reaction(s): Tongue finding  slurred speech    Penicillins Hives    Cholecalciferol (vitamin d3) Edema     Other reaction(s): Hand and joint of hands swelling     Past Medical History:   Diagnosis Date    SRUTHI positive     CAD (coronary artery disease)     CKD (chronic kidney disease)     Degenerative joint disease     Fibromyalgia     Glomus tympanicum tumor     Hepatitis C     Hypertension     ICD (implantable cardioverter-defibrillator) in place     Left sided sciatica     Noncompliance with medication regimen     Nonischemic cardiomyopathy     Seborrheic keratoses     Tinea corporis     Undifferentiated connective tissue disease     Ventricular fibrillation     Vitamin D deficiency      Past Surgical History:    Procedure Laterality Date    CARDIAC DEFIBRILLATOR PLACEMENT      DERMOID CYST  EXCISION      HYSTERECTOMY       Family History   Problem Relation Age of Onset    Hypertension Mother     Hypertension Father     Kidney failure Father     Diabetes Sister      Social History     Tobacco Use    Smoking status: Never Smoker    Smokeless tobacco: Never Used     Review of Systems   Constitutional: Negative for chills, fatigue and fever.   HENT: Negative for congestion, ear discharge, ear pain, nosebleeds, rhinorrhea and sore throat.    Respiratory: Negative for cough, chest tightness, shortness of breath and wheezing.    Cardiovascular: Negative for chest pain and leg swelling.   Gastrointestinal: Negative for abdominal pain, blood in stool, constipation, diarrhea, nausea and vomiting.   Genitourinary: Negative for dysuria, frequency, hematuria and urgency.   Musculoskeletal: Negative for arthralgias, myalgias and neck pain.   Neurological: Negative for dizziness, seizures, weakness, numbness and headaches.       Physical Exam     Initial Vitals [05/28/22 1538]   BP Pulse Resp Temp SpO2   (!) 176/85 79 16 98.6 °F (37 °C) 99 %      MAP       --         Physical Exam    Nursing note and vitals reviewed.  Constitutional: She appears well-developed. She is not diaphoretic. No distress.   HENT:   Head: Normocephalic and atraumatic.   Right Ear: External ear normal.   Left Ear: External ear normal.   Nose: Nose normal.   Mouth/Throat: Oropharynx is clear and moist.   Eyes: Conjunctivae and EOM are normal. Pupils are equal, round, and reactive to light. Right eye exhibits no discharge. Left eye exhibits no discharge. No scleral icterus.   Neck: Neck supple. No tracheal deviation present.   Normal range of motion.  Cardiovascular: Normal rate, regular rhythm and intact distal pulses. Exam reveals no gallop and no friction rub.    No murmur heard.  Pulmonary/Chest: Breath sounds normal. No stridor. No respiratory  distress. She has no wheezes. She has no rhonchi. She has no rales. She exhibits no tenderness.   Abdominal: Abdomen is soft. Bowel sounds are normal. She exhibits no distension. There is no abdominal tenderness. There is no guarding.   Musculoskeletal:         General: No tenderness or edema.      Cervical back: Normal range of motion and neck supple.     Neurological: She is alert and oriented to person, place, and time. No cranial nerve deficit or sensory deficit.   Skin: Skin is warm and dry. Capillary refill takes less than 2 seconds. No rash noted. No erythema. No pallor.         ED Course   Procedures  Labs Reviewed   COMPREHENSIVE METABOLIC PANEL - Abnormal; Notable for the following components:       Result Value    Chloride 111 (*)     Creatinine 1.11 (*)     All other components within normal limits   CBC WITH DIFFERENTIAL - Abnormal; Notable for the following components:    MCHC 31.4 (*)     All other components within normal limits   TROPONIN I - Normal   CBC W/ AUTO DIFFERENTIAL    Narrative:     The following orders were created for panel order CBC Auto Differential.  Procedure                               Abnormality         Status                     ---------                               -----------         ------                     CBC with Differential[[id number]]        Abnormal            Final result                 Please view results for these tests on the individual orders.     EKG Readings: (Independently Interpreted)   Rhythm: Normal Sinus Rhythm. Heart Rate: 81. Clinical Impression: with PVCs Other Impression: QTc 455   Time: 1543     ECG Results          EKG 12-lead (Final result)  Result time 05/29/22 12:10:25    Final result by Interface, Lab In [facility] (05/29/22 12:10:25)                 Narrative:    Test Reason : R07.9,    Vent. Rate : 081 BPM     Atrial Rate : 075 BPM     P-R Int : 154 ms          QRS Dur : 072 ms      QT Int : 392 ms       P-R-T Axes : 067 056 081 degrees      QTc Int : 455 ms    Sinus rhythm with frequent and consecutive Premature ventricular complexes  Abnormal ECG  When compared with ECG of 20-MAY-2022 04:25,  No significant change was found  Confirmed by Evy Reich MD ([id number]) on 5/29/2022 12:10:17 PM    Referred By:             Confirmed By:Evy Reich MD                            Imaging Results          X-Ray Chest 1 View (Final result)  Result time 05/28/22 16:06:42    Final result by Brooklyn Au MD (05/28/22 16:06:42)                 Impression:      No acute cardiopulmonary abnormality.      Electronically signed by: Brooklyn Au  Date:    05/28/2022  Time:    16:06             Narrative:    EXAMINATION:  XR CHEST 1 VIEW    CLINICAL HISTORY:  Chest pain, unspecified    TECHNIQUE:  Single frontal view of the chest was performed.    COMPARISON:  None    FINDINGS:  LINES AND TUBES: Single lead defibrillator device is in place via left subclavian approach with lead overlying the right ventricle.    MEDIASTINUM AND JOSH: The cardiac silhouette is normal.    LUNGS: No lobar consolidation. No edema.    PLEURA:No pleural effusion. No pneumothorax.    BONES: No acute osseous abnormality.                                 Medications - No data to display  Medical Decision Making:   Clinical Tests:   Lab Tests: Reviewed and Ordered  Radiological Study: Reviewed and Ordered  Medical Tests: Reviewed and Ordered            Attending Attestation:           Physician Attestation for Scribe:  Physician Attestation Statement for Scribe #1: I, J Carlos Mehta, reviewed documentation, as scribed by Tabatha Mchugh in my presence, and it is both accurate and complete.             ED Course as of 05/29/22 2114   Sat May 28, 2022   1744 Spoke with patient's cardiologist, Dr. Oscar Raymond.  Okay to discharge patient home.  Reports that he has her on the books for next week for pacemaker/defibrillator workup. [MM]   1744 Patient is doing well.  She is well-appearing.   She is awake alert oriented.  She is encouraged return emergency department worsening symptoms otherwise she is amenable to discharge.  She is encouraged to keep her appointment with her cardiologist next week.  Patient discharged home condition stable. [MM]      ED Course User Index  [MM] J Carlos Mehta MD             Clinical Impression:   Final diagnoses:  [R07.9] Chest pain  [T82.9XXA] Disorder of cardiac pacemaker system, initial encounter (Primary)          ED Disposition Condition    Discharge Stable        ED Prescriptions     None        Follow-up Information     Follow up With Specialties Details Why Contact Info    Jai Rosado MD Family Medicine  As needed [address] [facility] [address]  [phone]      Oscar Raymond, DO Cardiology Go on 6/3/2022  [address] [facility] [address]  [phone]             J Carlos Mehta MD  05/29/22 2115

## 2022-05-28 NOTE — FIRST PROVIDER EVALUATION
Medical screening exam completed.  I have conducted a focused provider triage encounter, findings are as follows:    Brief history of present illness:  67 year old female presents to ED for defibrillator beeping; Denies shock    Vitals:    05/28/22 1538   BP: (!) 176/85   Pulse: 79   Resp: 16   Temp: 98.6 °F (37 °C)   TempSrc: Oral   SpO2: 99%   Weight: 123.4 kg (272 lb)   Height: 6' (1.829 m)       Pertinent physical exam:  Awake, alert    Brief workup plan:  Labs, ekg, imaging     Preliminary workup initiated; this workup will be continued and followed by the physician or advanced practice provider that is assigned to the patient when roomed.

## 2022-06-01 ENCOUNTER — HOSPITAL ENCOUNTER (INPATIENT)
Facility: HOSPITAL | Age: 67
LOS: 1 days | Discharge: HOME | DRG: 309 | End: 2022-06-01
Attending: EMERGENCY MEDICINE | Admitting: EMERGENCY MEDICINE
Payer: MEDICARE

## 2022-06-01 VITALS
HEIGHT: 72 IN | OXYGEN SATURATION: 95 % | BODY MASS INDEX: 36.57 KG/M2 | RESPIRATION RATE: 20 BRPM | HEART RATE: 59 BPM | TEMPERATURE: 99 F | WEIGHT: 270 LBS | DIASTOLIC BLOOD PRESSURE: 57 MMHG | SYSTOLIC BLOOD PRESSURE: 134 MMHG

## 2022-06-01 DIAGNOSIS — Z95.810 IMPLANTABLE CARDIOVERTER-DEFIBRILLATOR (ICD) IN SITU: ICD-10-CM

## 2022-06-01 DIAGNOSIS — T82.110A AICD LEAD MALFUNCTION: Primary | Chronic | ICD-10-CM

## 2022-06-01 DIAGNOSIS — I49.9 IRREGULAR HEART RHYTHM: ICD-10-CM

## 2022-06-01 DIAGNOSIS — T82.118A PACEMAKER FAILURE: ICD-10-CM

## 2022-06-01 LAB
ALBUMIN SERPL-MCNC: 3.7 GM/DL (ref 3.4–4.8)
ALBUMIN/GLOB SERPL: 1.3 RATIO (ref 1.1–2)
ALP SERPL-CCNC: 107 UNIT/L (ref 40–150)
ALT SERPL-CCNC: 8 UNIT/L (ref 0–55)
AST SERPL-CCNC: 12 UNIT/L (ref 5–34)
BASOPHILS # BLD AUTO: 0.07 X10(3)/MCL (ref 0–0.2)
BASOPHILS NFR BLD AUTO: 1.4 %
BILIRUBIN DIRECT+TOT PNL SERPL-MCNC: 0.4 MG/DL
BNP BLD-MCNC: 216.3 PG/ML
BUN SERPL-MCNC: 27.7 MG/DL (ref 9.8–20.1)
CALCIUM SERPL-MCNC: 8.8 MG/DL (ref 8.4–10.2)
CHLORIDE SERPL-SCNC: 107 MMOL/L (ref 98–107)
CO2 SERPL-SCNC: 23 MMOL/L (ref 23–31)
CREAT SERPL-MCNC: 1.41 MG/DL (ref 0.55–1.02)
EOSINOPHIL # BLD AUTO: 0.31 X10(3)/MCL (ref 0–0.9)
EOSINOPHIL NFR BLD AUTO: 6.4 %
ERYTHROCYTE [DISTWIDTH] IN BLOOD BY AUTOMATED COUNT: 13 % (ref 11.5–17)
GLOBULIN SER-MCNC: 2.8 GM/DL (ref 2.4–3.5)
GLUCOSE SERPL-MCNC: 100 MG/DL (ref 82–115)
HCT VFR BLD AUTO: 37.2 % (ref 37–47)
HGB BLD-MCNC: 11.6 GM/DL (ref 12–16)
IMM GRANULOCYTES # BLD AUTO: 0.01 X10(3)/MCL (ref 0–0.02)
IMM GRANULOCYTES NFR BLD AUTO: 0.2 % (ref 0–0.43)
LYMPHOCYTES # BLD AUTO: 1.68 X10(3)/MCL (ref 0.6–4.6)
LYMPHOCYTES NFR BLD AUTO: 34.6 %
MAGNESIUM SERPL-MCNC: 2.2 MG/DL (ref 1.6–2.6)
MCH RBC QN AUTO: 28.4 PG (ref 27–31)
MCHC RBC AUTO-ENTMCNC: 31.2 MG/DL (ref 33–36)
MCV RBC AUTO: 91 FL (ref 80–94)
MONOCYTES # BLD AUTO: 0.34 X10(3)/MCL (ref 0.1–1.3)
MONOCYTES NFR BLD AUTO: 7 %
NEUTROPHILS # BLD AUTO: 2.4 X10(3)/MCL (ref 2.1–9.2)
NEUTROPHILS NFR BLD AUTO: 50.4 %
NRBC BLD AUTO-RTO: 0 %
PLATELET # BLD AUTO: 157 X10(3)/MCL (ref 130–400)
PMV BLD AUTO: 11.2 FL (ref 9.4–12.4)
POTASSIUM SERPL-SCNC: 4.6 MMOL/L (ref 3.5–5.1)
PROT SERPL-MCNC: 6.5 GM/DL (ref 5.8–7.6)
RBC # BLD AUTO: 4.09 X10(6)/MCL (ref 4.2–5.4)
SARS-COV-2 RNA RESP QL NAA+PROBE: NOT DETECTED
SODIUM SERPL-SCNC: 141 MMOL/L (ref 136–145)
TROPONIN I SERPL-MCNC: <0.01 NG/ML (ref 0–0.04)
WBC # SPEC AUTO: 4.9 X10(3)/MCL (ref 4.5–11.5)

## 2022-06-01 PROCEDURE — 84484 ASSAY OF TROPONIN QUANT: CPT | Performed by: EMERGENCY MEDICINE

## 2022-06-01 PROCEDURE — 85025 COMPLETE CBC W/AUTO DIFF WBC: CPT | Performed by: EMERGENCY MEDICINE

## 2022-06-01 PROCEDURE — 11000001 HC ACUTE MED/SURG PRIVATE ROOM

## 2022-06-01 PROCEDURE — 93005 ELECTROCARDIOGRAM TRACING: CPT

## 2022-06-01 PROCEDURE — 99284 EMERGENCY DEPT VISIT MOD MDM: CPT | Mod: 25

## 2022-06-01 PROCEDURE — 36415 COLL VENOUS BLD VENIPUNCTURE: CPT | Performed by: EMERGENCY MEDICINE

## 2022-06-01 PROCEDURE — 94761 N-INVAS EAR/PLS OXIMETRY MLT: CPT

## 2022-06-01 PROCEDURE — 83880 ASSAY OF NATRIURETIC PEPTIDE: CPT | Performed by: EMERGENCY MEDICINE

## 2022-06-01 PROCEDURE — 83735 ASSAY OF MAGNESIUM: CPT | Performed by: EMERGENCY MEDICINE

## 2022-06-01 PROCEDURE — 87635 SARS-COV-2 COVID-19 AMP PRB: CPT | Performed by: EMERGENCY MEDICINE

## 2022-06-01 PROCEDURE — 80053 COMPREHEN METABOLIC PANEL: CPT | Performed by: EMERGENCY MEDICINE

## 2022-06-01 RX ORDER — ONDANSETRON 2 MG/ML
4 INJECTION INTRAMUSCULAR; INTRAVENOUS EVERY 8 HOURS PRN
Status: DISCONTINUED | OUTPATIENT
Start: 2022-06-01 | End: 2022-06-01 | Stop reason: HOSPADM

## 2022-06-01 RX ORDER — ASPIRIN 325 MG
325 TABLET ORAL
Status: DISCONTINUED | OUTPATIENT
Start: 2022-06-01 | End: 2022-06-01

## 2022-06-01 RX ORDER — SODIUM CHLORIDE 0.9 % (FLUSH) 0.9 %
10 SYRINGE (ML) INJECTION
Status: DISCONTINUED | OUTPATIENT
Start: 2022-06-01 | End: 2022-06-01 | Stop reason: HOSPADM

## 2022-06-01 RX ORDER — HYDRALAZINE HYDROCHLORIDE 25 MG/1
25 TABLET, FILM COATED ORAL
Status: DISCONTINUED | OUTPATIENT
Start: 2022-06-01 | End: 2022-06-01 | Stop reason: HOSPADM

## 2022-06-01 RX ORDER — ACETAMINOPHEN 325 MG/1
650 TABLET ORAL EVERY 8 HOURS PRN
Status: DISCONTINUED | OUTPATIENT
Start: 2022-06-01 | End: 2022-06-01 | Stop reason: HOSPADM

## 2022-06-01 RX ORDER — TALC
6 POWDER (GRAM) TOPICAL NIGHTLY PRN
Status: DISCONTINUED | OUTPATIENT
Start: 2022-06-01 | End: 2022-06-01 | Stop reason: HOSPADM

## 2022-06-01 NOTE — ED NOTES
"Spoke to Kushal Wang with [employer]. States that pacemaker wires need to be replaced soon. Advises that magnet be placed on pacemaker, pt be put on tele monitoring until he arrives later this AM to "reprogram" pacemaker. Advises that pt have lifevest until pacemaker is replaced. Dr Yen notified. States to place pt on lifepack and states do not place magnet on pt.  "

## 2022-06-01 NOTE — ED PROVIDER NOTES
Encounter Date: 6/1/2022       History     Chief Complaint   Patient presents with    Pacemaker Problem     States pacemaker/defibrillator fired twice, once @ 0300 and 0400. Denies pain. Denies weakness/dizziness.      This is a 67-year-old female has history of pacemaker and she has had recent complications with.  She has had multiple visits recently were pacemaker has been making a noise and when it is interrogated it shows that she has got some lead failure.  She has not followed up with her cardiologist as an outpatient.  The patient's pacemaker began making a noise about 30 minutes prior to coming in was interrogated when she got here there were no shocks but it did show right ventricular lead impedance warning.  Patient is not having any chest pain or shortness of breath.  She has no other complaints.  Spoke with Dr. Oscar Raymond who says to not apply a magnet right now but keep a magnet nearby in case it is needed.  He will see and evaluate her this morning as an inpatient.        Review of patient's allergies indicates:   Allergen Reactions    Ketorolac      Other reaction(s): Tongue finding  slurred speech    Penicillins Hives    Cholecalciferol (vitamin d3) Edema     Other reaction(s): Hand and joint of hands swelling     Past Medical History:   Diagnosis Date    SRUTHI positive     CAD (coronary artery disease)     CKD (chronic kidney disease)     Degenerative joint disease     Fibromyalgia     Glomus tympanicum tumor     Hepatitis C     Hypertension     ICD (implantable cardioverter-defibrillator) in place     Left sided sciatica     Noncompliance with medication regimen     Nonischemic cardiomyopathy     Seborrheic keratoses     Tinea corporis     Undifferentiated connective tissue disease     Ventricular fibrillation     Vitamin D deficiency      Past Surgical History:   Procedure Laterality Date    CARDIAC DEFIBRILLATOR PLACEMENT      DERMOID CYST  EXCISION      HYSTERECTOMY        Family History   Problem Relation Age of Onset    Hypertension Mother     Hypertension Father     Kidney failure Father     Diabetes Sister      Social History     Tobacco Use    Smoking status: Never Smoker    Smokeless tobacco: Never Used     Review of Systems   Constitutional: Negative for chills, diaphoresis, fatigue and fever.   HENT: Negative for congestion and trouble swallowing.    Eyes: Negative for pain and discharge.   Respiratory: Negative for cough and wheezing.    Cardiovascular: Negative for chest pain and leg swelling.   Gastrointestinal: Negative for abdominal pain, diarrhea, nausea and vomiting.   Genitourinary: Negative for dysuria, flank pain, vaginal bleeding and vaginal discharge.   Musculoskeletal: Negative for arthralgias.   Skin: Negative for color change.   Neurological: Negative for syncope, speech difficulty and weakness.   Psychiatric/Behavioral: Negative for agitation and confusion.   All other systems reviewed and are negative.      Physical Exam     Initial Vitals [06/01/22 0437]   BP Pulse Resp Temp SpO2   (!) 172/68 82 15 98.6 °F (37 °C) 99 %      MAP       --         Physical Exam    HENT:   Head: Normocephalic.   Eyes: EOM are normal. Left eye exhibits no discharge. No scleral icterus.   Cardiovascular: Regular rhythm.   Pulmonary/Chest: No stridor. No respiratory distress.   Abdominal: She exhibits no distension.   Musculoskeletal:         General: Normal range of motion.     Neurological: She is alert and oriented to person, place, and time. She has normal strength.   Skin: Skin is dry. No rash noted. No erythema. No pallor.   Psychiatric: She has a normal mood and affect. Her behavior is normal. Judgment and thought content normal.         ED Course   Procedures  Labs Reviewed   CBC W/ AUTO DIFFERENTIAL    Narrative:     The following orders were created for panel order CBC auto differential.  Procedure                               Abnormality         Status                      ---------                               -----------         ------                     CBC with Differential[[id number]]                            In process                   Please view results for these tests on the individual orders.   COMPREHENSIVE METABOLIC PANEL   TROPONIN I   B-TYPE NATRIURETIC PEPTIDE   MAGNESIUM   CBC WITH DIFFERENTIAL   SARS-COV-2 RNA AMPLIFICATION, QUAL     EKG Readings: (Independently Interpreted)   Rhythm: Normal Sinus Rhythm. Ectopy: Bigeminy.   EKG shows bigeminy rate of 85 time was 4:40 a.m. no STEMI       Imaging Results    None          Medications   sodium chloride 0.9% flush 10 mL (has no administration in time range)   melatonin tablet 6 mg (has no administration in time range)   acetaminophen tablet 650 mg (has no administration in time range)   ondansetron injection 4 mg (has no administration in time range)                          Clinical Impression:   Final diagnoses:  [T82.118A] Pacemaker failure          ED Disposition Condition    Admit               Juan Jose Yen MD  06/01/22 0555

## 2022-06-01 NOTE — ED NOTES
"Pt to ED with complaints of pacemaker "going off" x2 tonight. States that this is the 3rd time in last week pacemaker has fired. Pt denies pain. \[facility]\"" last occurence was 05/29/22. Sees Dr Oscar Raymond for cardio. \[facility]\"" has appointment on 06/15/2022. Pt denies any symptoms at this time. Pt aaox4, nad, ARANDA; pt updated on plan of care, verbalizes understanding  "

## 2022-06-01 NOTE — CONSULTS
H&P Patient Demographics:  Name: Jacqueline Miller  Age:  67 y.o.  MRN:  [id number]  Admission Date: 6/1/2022    Chief Complaint upon admit:  Issue w AICD, making a noise.     History of Present Illness: Patient is a 67-year-old obese  female well known to myself.  The patient has a remote history of congestive heart failure that prompted the implantation of her AICD in July 2015. Since that time the patient's left ventricular systolic function has normalized.  Had no recent shocks from her ICD which until recently had been functioning well.  Over the last few weeks the  patient has had at least 3 visits to the emergency department with complaints of abnormal sound coming from her ICD.  Subsequent interrogations finds that the patient's ICD lead shows signs of fracture.  The device was again interrogated earlier today while he was in the emergency department with similar complaints.  She has had no shocks from the device.  Of note is that a couple of months after implantation, in October 2015 she did require an AICD lead revision.  She otherwise denies any other significant cardiovascular issues or complaints.  The patient, in the past, was scheduled for follow-up in my office to deal with this issue however due to the recurrent issues she has presented back to the emergency department before her scheduled appointment in the office.      Past Medical History:   Diagnosis Date    SRUTHI positive     CAD (coronary artery disease)     CKD (chronic kidney disease)     Degenerative joint disease     Fibromyalgia     Glomus tympanicum tumor     Hepatitis C     Hypertension     ICD (implantable cardioverter-defibrillator) in place     Left sided sciatica     Noncompliance with medication regimen     Nonischemic cardiomyopathy     Seborrheic keratoses     Tinea corporis     Undifferentiated connective tissue disease     Ventricular fibrillation     Vitamin D deficiency        Social History      Socioeconomic History    Marital status:    Tobacco Use    Smoking status: Never Smoker    Smokeless tobacco: Never Used       Past Surgical History:   Procedure Laterality Date    CARDIAC DEFIBRILLATOR PLACEMENT      DERMOID CYST  EXCISION      HYSTERECTOMY         Family History   Problem Relation Age of Onset    Hypertension Mother     Hypertension Father     Kidney failure Father     Diabetes Sister        Allergies:   Review of patient's allergies indicates:   Allergen Reactions    Ketorolac      Other reaction(s): Tongue finding  slurred speech    Penicillins Hives    Cholecalciferol (vitamin d3) Edema     Other reaction(s): Hand and joint of hands swelling       Prior to Admission medications    Medication Sig Start Date End Date Taking? Authorizing Provider   aspirin (ECOTRIN) 81 MG EC tablet Take 1 tablet by mouth once daily 5/6/22  Yes Jai Rosado MD   carvediloL (COREG) 12.5 MG tablet Take 1 tablet by mouth twice daily 5/5/22  Yes Jai Rosado MD   carvediloL (COREG) 12.5 MG tablet Take 1 tablet (12.5 mg total) by mouth 2 (two) times daily with meals. 5/5/22 5/5/23 Yes Jai Rosado MD   carvediloL (COREG) 12.5 MG tablet Take 1 tablet by mouth 2 (two) times a day. 11/15/21  Yes Historical Provider   ENTRESTO 24-26 mg per tablet Take 1 tablet by mouth 2 (two) times daily. 5/2/22  Yes Historical Provider   hydrOXYchloroQUINE (PLAQUENIL) 200 mg tablet Take 200 mg by mouth 2 (two) times a day. 6/28/21  Yes Historical Provider   acyclovir (ZOVIRAX) 800 MG Tab Take 800 mg by mouth 5 (five) times daily. 5/26/22   Historical Provider   cyclobenzaprine (FLEXERIL) 10 MG tablet Take 10 mg by mouth every evening. 6/28/21   Historical Provider   magnesium oxide (MAG-OX) 400 mg (241.3 mg magnesium) tablet Take 400 mg by mouth 2 (two) times a day. 1/14/22   Historical Provider   nitroGLYCERIN (NITROSTAT) 0.4 MG SL tablet Place 1 tablet (0.4 mg total)  under the tongue every 5 (five) minutes as needed for Chest pain. 5/27/22 5/27/23  Jai Rosado MD   prednisoLONE acetate (PRED FORTE) 1 % DrpS Apply 1 drop to eye 4 (four) times daily. 9/16/21   Historical Provider   ZIRGAN 0.15 % Gel Place 1 drop into the left eye 5 (five) times daily. 5/25/22   Historical Provider         Current Facility-Administered Medications:     acetaminophen tablet 650 mg, 650 mg, Oral, Q8H PRN, Juan Jose Yen MD    hydrALAZINE tablet 25 mg, 25 mg, Oral, ED 1 Time, Juan Jose Yen MD    melatonin tablet 6 mg, 6 mg, Oral, Nightly PRN, Juan Jose Yen MD    ondansetron injection 4 mg, 4 mg, Intravenous, Q8H PRN, Juan Jose Yen MD    sodium chloride 0.9% flush 10 mL, 10 mL, Intravenous, PRN, Juan Jose Yen MD    Current Outpatient Medications:     aspirin (ECOTRIN) 81 MG EC tablet, Take 1 tablet by mouth once daily, Disp: 90 tablet, Rfl: 0    carvediloL (COREG) 12.5 MG tablet, Take 1 tablet by mouth twice daily, Disp: 180 tablet, Rfl: 0    carvediloL (COREG) 12.5 MG tablet, Take 1 tablet (12.5 mg total) by mouth 2 (two) times daily with meals., Disp: 60 tablet, Rfl: 11    carvediloL (COREG) 12.5 MG tablet, Take 1 tablet by mouth 2 (two) times a day., Disp: , Rfl:     ENTRESTO 24-26 mg per tablet, Take 1 tablet by mouth 2 (two) times daily., Disp: , Rfl:     hydrOXYchloroQUINE (PLAQUENIL) 200 mg tablet, Take 200 mg by mouth 2 (two) times a day., Disp: , Rfl:     acyclovir (ZOVIRAX) 800 MG Tab, Take 800 mg by mouth 5 (five) times daily., Disp: , Rfl:     cyclobenzaprine (FLEXERIL) 10 MG tablet, Take 10 mg by mouth every evening., Disp: , Rfl:     magnesium oxide (MAG-OX) 400 mg (241.3 mg magnesium) tablet, Take 400 mg by mouth 2 (two) times a day., Disp: , Rfl:     nitroGLYCERIN (NITROSTAT) 0.4 MG SL tablet, Place 1 tablet (0.4 mg total) under the tongue every 5 (five) minutes as needed for Chest pain., Disp: 100 tablet, Rfl: 2     prednisoLONE acetate (PRED FORTE) 1 % DrpS, Apply 1 drop to eye 4 (four) times daily., Disp: , Rfl:     ZIRGAN 0.15 % Gel, Place 1 drop into the left eye 5 (five) times daily., Disp: , Rfl:     Active Hospital Problems    Diagnosis  POA    *AICD lead malfunction [T82.110A]  Yes     Chronic      Resolved Hospital Problems   No resolved problems to display.       Blood pressure 121/74, pulse 62, temperature 98.6 °F (37 °C), temperature source Oral, resp. rate 18, height 6' (1.829 m), weight 122.5 kg (270 lb), SpO2 99 %.      Review of Systems   Reason unable to perform ROS: Pt seen and eval by the ED staff.    All other systems reviewed and are negative.        Physical Exam  Vitals (Pt seen and evaluated by the ED staff. ) and nursing note reviewed.   Cardiovascular:      Comments:  - ICD located in L ant chest.           Inpatient Diagnostics:  Recent Results (from the past 24 hour(s))   Comprehensive metabolic panel    Collection Time: 06/01/22  5:20 AM   Result Value Ref Range    Sodium Level 141 136 - 145 mmol/L    Potassium Level 4.6 3.5 - 5.1 mmol/L    Chloride 107 98 - 107 mmol/L    Carbon Dioxide 23 23 - 31 mmol/L    Glucose Level 100 82 - 115 mg/dL    Blood Urea Nitrogen 27.7 (H) 9.8 - 20.1 mg/dL    Creatinine 1.41 (H) 0.55 - 1.02 mg/dL    Calcium Level Total 8.8 8.4 - 10.2 mg/dL    Protein Total 6.5 5.8 - 7.6 gm/dL    Albumin Level 3.7 3.4 - 4.8 gm/dL    Globulin 2.8 2.4 - 3.5 gm/dL    Albumin/Globulin Ratio 1.3 1.1 - 2.0 ratio    Bilirubin Total 0.4 <=1.5 mg/dL    Alkaline Phosphatase 107 40 - 150 unit/L    Alanine Aminotransferase 8 0 - 55 unit/L    Aspartate Aminotransferase 12 5 - 34 unit/L    Estimated GFR- 48 mls/min/1.73/m2   Troponin I #1    Collection Time: 06/01/22  5:20 AM   Result Value Ref Range    Troponin-I <0.010 0.000 - 0.045 ng/mL   B-Type natriuretic peptide (BNP)    Collection Time: 06/01/22  5:20 AM   Result Value Ref Range    Natriuretic Peptide 216.3 (H) <=100.0  pg/mL   Magnesium    Collection Time: 06/01/22  5:20 AM   Result Value Ref Range    Magnesium Level 2.20 1.60 - 2.60 mg/dL   CBC with Differential    Collection Time: 06/01/22  5:20 AM   Result Value Ref Range    WBC 4.9 4.5 - 11.5 x10(3)/mcL    RBC 4.09 (L) 4.20 - 5.40 x10(6)/mcL    Hgb 11.6 (L) 12.0 - 16.0 gm/dL    Hct 37.2 37.0 - 47.0 %    MCV 91.0 80.0 - 94.0 fL    MCH 28.4 27.0 - 31.0 pg    MCHC 31.2 (L) 33.0 - 36.0 mg/dL    RDW 13.0 11.5 - 17.0 %    Platelet 157 130 - 400 x10(3)/mcL    MPV 11.2 9.4 - 12.4 fL    Neut % 50.4 %    Lymph % 34.6 %    Mono % 7.0 %    Eos % 6.4 %    Basophil % 1.4 %    Lymph # 1.68 0.6 - 4.6 x10(3)/mcL    Neut # 2.4 2.1 - 9.2 x10(3)/mcL    Mono # 0.34 0.1 - 1.3 x10(3)/mcL    Eos # 0.31 0 - 0.9 x10(3)/mcL    Baso # 0.07 0 - 0.2 x10(3)/mcL    IG# 0.01 0 - 0.0155 x10(3)/mcL    IG% 0.2 0 - 0.43 %    NRBC% 0.0 %   COVID-19 Routine Screening    Collection Time: 06/01/22  6:36 AM   Result Value Ref Range    SARS-CoV-2 PCR Not Detected Not Detected       Assessment and Plan:    Irregular heart rhythm  -     EKG 12-lead; Standing    Pacemaker failure    Implantable cardioverter-defibrillator (ICD) in situ  -     EKG 12-lead; Standing    Other orders  -     Vital signs; Standing  -     Cardiac Monitoring - Adult; Standing  -     Cancel: Pulse Oximetry Continuous; Standing  -     Diet NPO; Standing  -     Saline lock IV; Standing  -     CBC auto differential; Standing  -     Comprehensive metabolic panel; Standing  -     Troponin I #1; Standing  -     B-Type natriuretic peptide (BNP); Standing  -     Discontinue: aspirin tablet 325 mg  -     Magnesium; Standing  -     Vital signs; Standing  -     Intake and output; Standing  -     Notify physician ; Standing  -     sodium chloride 0.9% flush 10 mL  -     IP VTE HIGH RISK PATIENT; Standing  -     Place sequential compression device; Standing  -     melatonin tablet 6 mg  -     Pulse Oximetry Q4H; Standing  -     Full code; Standing  -     Admit  to Inpatient; Standing  -     acetaminophen tablet 650 mg  -     ondansetron injection 4 mg  -     Cancel: COVID-19 Rapid Screening; Standing  -     hydrALAZINE tablet 25 mg  -     COVID-19 Routine Screening; Standing    VVI AICD Lead Malfunction       As discussed w the ED attending and staff, and as discussed with the [employer] Rep, the patient appears to be clinically stable at this time and from a cardiovascular standpoint.  Her last echocardiogram showed a normal left ventricular systolic ejection fraction.  We have decided to turn off the defibrillator portion of her ICD while maintaining the patient in the pacing portion of the ICD function.  This should take care of the noise issue.  The patient is scheduled to follow-up in my office tomorrow 06/02/2022 for evaluation and interrogation and determine plan of care that point.  Most likely since her cardiovascular status is stable in this regard, with respect to her heart failure, cardiomyopathy issues, she does not seem to have significant need for her ICD at this point.  Most likely we may consider explant of the device once the generator is depleted or based on the patient's request.  She will be advised to continue on the same medications as on admission and as discussed.  I will also be discussing these issues further with the patient's family and further recommendations forthcoming.      Oscar Raymond  6/1/2022

## 2022-06-07 NOTE — DISCHARGE SUMMARY
Jacqueline Miller  1955 67 y.o. female     MRN NUMBER: [id number]      ADMISSION DATE:6/1/2022    DISCHARGE DATE: 6/1/2022        HOSPITAL COURSE:  The patient's device was interrogated in the emergency room with the help of the pacemaker representative.  We had decided to turn off the defibrillator component of her ICD in order to avoid an inappropriate shock.  We kept the pacing function of the device intact.  She will be scheduled to follow up in my office in the very near future further evaluation and management.            Discharge Meds:       Medication List      CHANGE how you take these medications    carvediloL 12.5 MG tablet  Commonly known as: COREG  Take 1 tablet by mouth twice daily  What changed: Another medication with the same name was removed. Continue taking this medication, and follow the directions you see here.        CONTINUE taking these medications    acyclovir 800 MG Tab  Commonly known as: ZOVIRAX     aspirin 81 MG EC tablet  Commonly known as: ECOTRIN  Take 1 tablet by mouth once daily     cyclobenzaprine 10 MG tablet  Commonly known as: FLEXERIL     ENTRESTO 24-26 mg per tablet  Generic drug: sacubitriL-valsartan     hydrOXYchloroQUINE 200 mg tablet  Commonly known as: PLAQUENIL     magnesium oxide 400 mg (241.3 mg magnesium) tablet  Commonly known as: MAG-OX     nitroGLYCERIN 0.4 MG SL tablet  Commonly known as: NITROSTAT  Place 1 tablet (0.4 mg total) under the tongue every 5 (five) minutes as needed for Chest pain.     prednisoLONE acetate 1 % Drps  Commonly known as: PRED FORTE     ZIRGAN 0.15 % Gel  Generic drug: ganciclovir             Continuing home medications.       Physical exam:    Objective:  Vitals:    06/01/22 1337   BP: (!) 134/57   Pulse: (!) 59   Resp: 20   Temp:      No intake or output data in the 24 hours ending 06/06/22 2343    Physical Exam  Cardiovascular:      Rate and Rhythm: Normal rate and regular rhythm.      Pulses: Normal pulses.      Heart sounds: Murmur  heard.   Pulmonary:      Effort: Pulmonary effort is normal.      Breath sounds: Normal breath sounds.   Abdominal:      General: Bowel sounds are normal.      Palpations: Abdomen is soft.   Musculoskeletal:         General: Normal range of motion.      Cervical back: Normal range of motion.   Skin:     General: Skin is warm and dry.   Neurological:      General: No focal deficit present.      Mental Status: She is alert.   Psychiatric:         Mood and Affect: Mood normal.            No components found for: CHEMISTRY   .  Recent Labs   Lab 06/01/22  0520   WBC 4.9   HGB 11.6*   HCT 37.2            Recent Labs   Lab 06/01/22  0520      K 4.6   CO2 23   BUN 27.7*   CREATININE 1.41*   CALCIUM 8.8   BILITOT 0.4   ALKPHOS 107   ALT 8   AST 12   GLUCOSE 100      No results for input(s): CHOL in the last 168 hours. No results for input(s): CHOL in the last 168 hours. No results for input(s): DIGOXIN in the last 168 hours. No results for input(s): APTT, INR, PTT in the last 168 hours.   Recent Labs   Lab 06/01/22 0520   TROPONINI <0.010        No results for input(s): TSH, T3FREE, FREET4 in the last 168 hours. No results found for: PSA, PSADIAG, PSATOTAL, PSAFREE, PSAFREEPCT No results for input(s): RETICULOCYTE in the last 168 hours.     RESULTS  X-Ray Chest 1 View    Result Date: 5/28/2022  EXAMINATION: XR CHEST 1 VIEW CLINICAL HISTORY: Chest pain, unspecified TECHNIQUE: Single frontal view of the chest was performed. COMPARISON: None FINDINGS: LINES AND TUBES: Single lead defibrillator device is in place via left subclavian approach with lead overlying the right ventricle. MEDIASTINUM AND JOSH: The cardiac silhouette is normal. LUNGS: No lobar consolidation. No edema. PLEURA:No pleural effusion. No pneumothorax. BONES: No acute osseous abnormality.     No acute cardiopulmonary abnormality. Electronically signed by: Brooklyn Au Date:    05/28/2022 Time:    16:06    X-Ray Chest 1 View    Result Date:  5/20/2022  EXAMINATION: XR CHEST 1 VIEW CLINICAL HISTORY: Presence of cardiac pacemaker TECHNIQUE: Single view of the chest COMPARISON: No prior imaging available for comparison. FINDINGS: No focal opacification, pleural effusion, or pneumothorax. The cardiomediastinal silhouette is within normal limits.  Left-sided cardiac device is noted. No acute osseous abnormality.     No acute cardiopulmonary process. Electronically signed by: Brian Weber Date:    05/20/2022 Time:    06:37       Discharge Diagnosis:  Problem List Items Addressed This Visit        Cardiac/Vascular    * (Principal) AICD lead malfunction - Primary (Chronic)    Relevant Orders    Activity as tolerated    Diet Cardiac    Implantable cardioverter-defibrillator (ICD) in situ    Relevant Orders    EKG 12-lead (Completed)    Irregular heart rhythm    Relevant Orders    EKG 12-lead (Completed)      Other Visit Diagnoses     Pacemaker failure                 Plan:  The patient's device was interrogated in the emergency room with the help of the pacemaker representative.  We had decided to turn off the defibrillator component of her ICD in order to avoid an inappropriate shock.  We kept the pacing function of the device intact.  She will be scheduled to follow up in my office in the very near future further evaluation and management.

## 2022-06-29 ENCOUNTER — HOSPITAL ENCOUNTER (EMERGENCY)
Facility: HOSPITAL | Age: 67
Discharge: HOME | End: 2022-06-30
Attending: EMERGENCY MEDICINE
Payer: MEDICARE

## 2022-06-29 DIAGNOSIS — T82.110D FAILURE OF IMPLANTABLE CARDIOVERTER-DEFIBRILLATOR (ICD) LEAD, SUBSEQUENT ENCOUNTER: Primary | ICD-10-CM

## 2022-06-29 DIAGNOSIS — I49.3 FREQUENT PVCS: ICD-10-CM

## 2022-06-29 DIAGNOSIS — I10 HYPERTENSION, UNSPECIFIED TYPE: ICD-10-CM

## 2022-06-29 LAB
ALBUMIN SERPL-MCNC: 3.7 GM/DL (ref 3.4–4.8)
ALBUMIN/GLOB SERPL: 1.2 RATIO (ref 1.1–2)
ALP SERPL-CCNC: 131 UNIT/L (ref 40–150)
ALT SERPL-CCNC: 9 UNIT/L (ref 0–55)
AST SERPL-CCNC: 11 UNIT/L (ref 5–34)
BASOPHILS # BLD AUTO: 0.06 X10(3)/MCL (ref 0–0.2)
BASOPHILS NFR BLD AUTO: 1.1 %
BILIRUBIN DIRECT+TOT PNL SERPL-MCNC: 0.3 MG/DL
BNP BLD-MCNC: 423.1 PG/ML
BUN SERPL-MCNC: 12.2 MG/DL (ref 9.8–20.1)
CALCIUM SERPL-MCNC: 9.2 MG/DL (ref 8.4–10.2)
CHLORIDE SERPL-SCNC: 112 MMOL/L (ref 98–107)
CO2 SERPL-SCNC: 25 MMOL/L (ref 23–31)
CREAT SERPL-MCNC: 0.96 MG/DL (ref 0.55–1.02)
EOSINOPHIL # BLD AUTO: 0.34 X10(3)/MCL (ref 0–0.9)
EOSINOPHIL NFR BLD AUTO: 6.2 %
ERYTHROCYTE [DISTWIDTH] IN BLOOD BY AUTOMATED COUNT: 13.6 % (ref 11.5–17)
GLOBULIN SER-MCNC: 3 GM/DL (ref 2.4–3.5)
GLUCOSE SERPL-MCNC: 123 MG/DL (ref 82–115)
HCT VFR BLD AUTO: 36.4 % (ref 37–47)
HGB BLD-MCNC: 11.4 GM/DL (ref 12–16)
IMM GRANULOCYTES # BLD AUTO: 0.01 X10(3)/MCL (ref 0–0.02)
IMM GRANULOCYTES NFR BLD AUTO: 0.2 % (ref 0–0.43)
INR BLD: 0.98 (ref 0–1.3)
LYMPHOCYTES # BLD AUTO: 1.95 X10(3)/MCL (ref 0.6–4.6)
LYMPHOCYTES NFR BLD AUTO: 35.5 %
MCH RBC QN AUTO: 28.5 PG (ref 27–31)
MCHC RBC AUTO-ENTMCNC: 31.3 MG/DL (ref 33–36)
MCV RBC AUTO: 91 FL (ref 80–94)
MONOCYTES # BLD AUTO: 0.23 X10(3)/MCL (ref 0.1–1.3)
MONOCYTES NFR BLD AUTO: 4.2 %
NEUTROPHILS # BLD AUTO: 2.9 X10(3)/MCL (ref 2.1–9.2)
NEUTROPHILS NFR BLD AUTO: 52.8 %
NRBC BLD AUTO-RTO: 0 %
PLATELET # BLD AUTO: 183 X10(3)/MCL (ref 130–400)
PMV BLD AUTO: 11 FL (ref 7.4–10.4)
POTASSIUM SERPL-SCNC: 4.2 MMOL/L (ref 3.5–5.1)
PROT SERPL-MCNC: 6.7 GM/DL (ref 5.8–7.6)
PROTHROMBIN TIME: 12.9 SECONDS (ref 12.5–14.5)
RBC # BLD AUTO: 4 X10(6)/MCL (ref 4.2–5.4)
SODIUM SERPL-SCNC: 143 MMOL/L (ref 136–145)
TROPONIN I SERPL-MCNC: <0.01 NG/ML (ref 0–0.04)
WBC # SPEC AUTO: 5.5 X10(3)/MCL (ref 4.5–11.5)

## 2022-06-29 PROCEDURE — 85025 COMPLETE CBC W/AUTO DIFF WBC: CPT | Performed by: EMERGENCY MEDICINE

## 2022-06-29 PROCEDURE — 84484 ASSAY OF TROPONIN QUANT: CPT | Performed by: EMERGENCY MEDICINE

## 2022-06-29 PROCEDURE — 80053 COMPREHEN METABOLIC PANEL: CPT | Performed by: EMERGENCY MEDICINE

## 2022-06-29 PROCEDURE — 36415 COLL VENOUS BLD VENIPUNCTURE: CPT | Performed by: EMERGENCY MEDICINE

## 2022-06-29 PROCEDURE — 83880 ASSAY OF NATRIURETIC PEPTIDE: CPT | Performed by: EMERGENCY MEDICINE

## 2022-06-29 PROCEDURE — 93010 ELECTROCARDIOGRAM REPORT: CPT | Mod: ,,, | Performed by: INTERNAL MEDICINE

## 2022-06-29 PROCEDURE — 85610 PROTHROMBIN TIME: CPT | Performed by: EMERGENCY MEDICINE

## 2022-06-29 PROCEDURE — 93005 ELECTROCARDIOGRAM TRACING: CPT

## 2022-06-29 PROCEDURE — 93010 EKG 12-LEAD: ICD-10-PCS | Mod: ,,, | Performed by: INTERNAL MEDICINE

## 2022-06-29 PROCEDURE — 99285 EMERGENCY DEPT VISIT HI MDM: CPT | Mod: 25

## 2022-06-30 VITALS
HEART RATE: 66 BPM | WEIGHT: 275 LBS | BODY MASS INDEX: 37.25 KG/M2 | TEMPERATURE: 98 F | SYSTOLIC BLOOD PRESSURE: 154 MMHG | RESPIRATION RATE: 18 BRPM | OXYGEN SATURATION: 99 % | HEIGHT: 72 IN | DIASTOLIC BLOOD PRESSURE: 84 MMHG

## 2022-06-30 NOTE — ED PROVIDER NOTES
"Encounter Date: 6/29/2022       History     Chief Complaint   Patient presents with    Pacemaker Problem     States defibrillator is warning her she is about to get shocked.  [employer] device in place.  Pt denies any complaints.       68 y/o F presents to the ED for evaluation as she reports her defibrillator is alerting her to impending shock delivery; however, she was seen here last month and told that her device was malfunctioning and that the device was "deactivated". She denies any palpitations, chest pain, shortness of breath or syncope.      The history is provided by the patient.   General Illness   The current episode started just prior to arrival. The problem occurs occasionally. The problem has been unchanged. The pain is at a severity of 0/10. Nothing relieves the symptoms. Nothing aggravates the symptoms. Pertinent negatives include no fever, no abdominal pain and no shortness of breath.     Review of patient's allergies indicates:   Allergen Reactions    Ketorolac      Other reaction(s): Tongue finding  slurred speech    Penicillins Hives    Cholecalciferol (vitamin d3) Edema     Other reaction(s): Hand and joint of hands swelling     Past Medical History:   Diagnosis Date    SRUTHI positive     CAD (coronary artery disease)     CKD (chronic kidney disease)     Degenerative joint disease     Fibromyalgia     Glomus tympanicum tumor     Hepatitis C     Hypertension     ICD (implantable cardioverter-defibrillator) in place     Left sided sciatica     Noncompliance with medication regimen     Nonischemic cardiomyopathy     Seborrheic keratoses     Tinea corporis     Undifferentiated connective tissue disease     Ventricular fibrillation     Vitamin D deficiency      Past Surgical History:   Procedure Laterality Date    CARDIAC DEFIBRILLATOR PLACEMENT      DERMOID CYST  EXCISION      HYSTERECTOMY       Family History   Problem Relation Age of Onset    Hypertension Mother     " Hypertension Father     Kidney failure Father     Diabetes Sister      Social History     Tobacco Use    Smoking status: Never Smoker    Smokeless tobacco: Never Used     Review of Systems   Constitutional: Negative for chills and fever.   Respiratory: Negative for chest tightness and shortness of breath.    Cardiovascular: Negative for chest pain and palpitations.   Gastrointestinal: Negative for abdominal pain.   Neurological: Negative for syncope.   All other systems reviewed and are negative.      Physical Exam     Initial Vitals [06/29/22 2119]   BP Pulse Resp Temp SpO2   (!) 213/108 83 16 98.4 °F (36.9 °C) 97 %      MAP       --         Physical Exam    Nursing note and vitals reviewed.  Constitutional: She appears well-developed and well-nourished. No distress.   HENT:   Head: Normocephalic and atraumatic.   Eyes: Pupils are equal, round, and reactive to light.   Neck: Neck supple.   Cardiovascular: Normal rate and intact distal pulses.   Occasional ectopy   Pulmonary/Chest: No respiratory distress.   Abdominal: Abdomen is soft.   Musculoskeletal:         General: No edema. Normal range of motion.      Cervical back: Neck supple.     Neurological: She is alert and oriented to person, place, and time. GCS score is 15. GCS eye subscore is 4. GCS verbal subscore is 5. GCS motor subscore is 6.   Skin: Skin is warm and dry.         ED Course   Procedures  Labs Reviewed   COMPREHENSIVE METABOLIC PANEL - Abnormal; Notable for the following components:       Result Value    Chloride 112 (*)     Glucose Level 123 (*)     All other components within normal limits   B-TYPE NATRIURETIC PEPTIDE - Abnormal; Notable for the following components:    Natriuretic Peptide 423.1 (*)     All other components within normal limits   CBC WITH DIFFERENTIAL - Abnormal; Notable for the following components:    RBC 4.00 (*)     Hgb 11.4 (*)     Hct 36.4 (*)     MCHC 31.3 (*)     MPV 11.0 (*)     All other components within normal  limits   TROPONIN I - Normal   PROTIME-INR - Normal   CBC W/ AUTO DIFFERENTIAL    Narrative:     The following orders were created for panel order CBC auto differential.  Procedure                               Abnormality         Status                     ---------                               -----------         ------                     CBC with Differential[[id number]]        Abnormal            Final result                 Please view results for these tests on the individual orders.     EKG Readings: (Independently Interpreted)   Initial Reading: No STEMI. Rhythm: Normal Sinus Rhythm. Heart Rate: 80. Ectopy: PVCs Frequent. Conduction: Normal. ST Segments: Normal ST Segments. T Waves: Normal. Axis: Normal. Clinical Impression: Normal Sinus Rhythm with PVCs   06/29/2022 @ 2121       Imaging Results          X-Ray Chest PA And Lateral (Final result)  Result time 06/30/22 08:55:41    Final result by Zachary Boyd MD (06/30/22 08:55:41)                 Impression:      No acute cardiopulmonary process identified.      Electronically signed by: Zachary Boyd  Date:    06/30/2022  Time:    08:55             Narrative:    EXAMINATION:  XR CHEST PA AND LATERAL    CLINICAL HISTORY:  Chest Pain;    TECHNIQUE:  Two views    COMPARISON:  May 28, 2022.    FINDINGS:  Cardiopericardial silhouette is within normal limits.  Left chest implanted cardiac device electrode terminates within the right ventricle.  No acute dense focal or segmental consolidation, overt congestion, pleural effusion or pneumothorax.                                 Medications - No data to display  Medical Decision Making:   Initial Assessment:   Ms. Miller presented for evaluation reporting that her ICD is warning her of impending shock despite reportedly being deactivated last month. She denies any physical symptoms.   Differential Diagnosis:   Device malfunction  Independently Interpreted Test(s):   I have ordered and independently interpreted EKG  Reading(s) - see prior notes  Clinical Tests:   Lab Tests: Ordered and Reviewed  The following lab test(s) were unremarkable: CBC and CMP  Radiological Study: Ordered and Reviewed  Medical Tests: Reviewed and Ordered  ED Management:  Labs, XR unrevealing. She does have frequent ectopy on telemetry monitoring but no sustained/sequential ectopy noted. We've interrogated her device and have received assurance that her device is currently inactivated for shock delivery. They report that the tone she is hearing is due to lack of communication of the device with her equipment at home.  I've communicated with her primary cardiiologist who will see her in follow up shortly to discuss next best options in management. ED return precautions reviewed at the bedside and provided in the written discharge instructions. All questions answered to the best of my ability.                ED Course as of 06/30/22 0006 Wed Jun 29, 2022 2247 Discussed with Dr. Oscar Raymond, ok for DC home, can call clinic to follow up tomorrow and discuss further w/ [employer] rep at that time if needed. Plan for outpatient LHC in near future to further evaluate.  [KS]      ED Course User Index  [KS] Winsome Flanagan MD             Clinical Impression:   Final diagnoses:  [T82.110D] Failure of implantable cardioverter-defibrillator (ICD) lead, subsequent encounter (Primary)  [I49.3] Frequent PVCs  [I10] Hypertension, unspecified type          ED Disposition Condition    Discharge Stable        ED Prescriptions     None        Follow-up Information     Follow up With Specialties Details Why Contact Noam Raymond, DO Cardiology Schedule an appointment as soon as possible for a visit today  [address]  [facility] [address]  [phone]      [facility] - Emergency Dept Emergency Medicine  As needed, If symptoms worsen [address] [facility] [id number] [phone]           Winsome Flanagan MD  07/25/22  [id number]

## 2022-07-06 ENCOUNTER — OFFICE VISIT (OUTPATIENT)
Dept: FAMILY MEDICINE | Facility: CLINIC | Age: 67
End: 2022-07-06
Payer: MEDICARE

## 2022-07-06 VITALS
DIASTOLIC BLOOD PRESSURE: 94 MMHG | WEIGHT: 272 LBS | SYSTOLIC BLOOD PRESSURE: 158 MMHG | BODY MASS INDEX: 36.84 KG/M2 | OXYGEN SATURATION: 100 % | HEIGHT: 72 IN | TEMPERATURE: 98 F | RESPIRATION RATE: 18 BRPM | HEART RATE: 70 BPM

## 2022-07-06 DIAGNOSIS — T82.110A AICD LEAD MALFUNCTION: Chronic | ICD-10-CM

## 2022-07-06 DIAGNOSIS — Z95.810 IMPLANTABLE CARDIOVERTER-DEFIBRILLATOR (ICD) IN SITU: Primary | ICD-10-CM

## 2022-07-06 DIAGNOSIS — I10 PRIMARY HYPERTENSION: ICD-10-CM

## 2022-07-06 DIAGNOSIS — I25.10 CORONARY ARTERY DISEASE INVOLVING NATIVE CORONARY ARTERY OF NATIVE HEART WITHOUT ANGINA PECTORIS: ICD-10-CM

## 2022-07-06 DIAGNOSIS — Z12.11 SCREENING FOR MALIGNANT NEOPLASM OF COLON: ICD-10-CM

## 2022-07-06 PROCEDURE — 99214 OFFICE O/P EST MOD 30 MIN: CPT | Mod: S$PBB,,, | Performed by: FAMILY MEDICINE

## 2022-07-06 PROCEDURE — 99214 PR OFFICE/OUTPT VISIT, EST, LEVL IV, 30-39 MIN: ICD-10-PCS | Mod: S$PBB,,, | Performed by: FAMILY MEDICINE

## 2022-07-06 PROCEDURE — 99215 OFFICE O/P EST HI 40 MIN: CPT | Mod: PBBFAC | Performed by: FAMILY MEDICINE

## 2022-07-06 NOTE — PROGRESS NOTES
[facility] - Family Medicine  [address] [facility] [address]  Phone: [phone]   Subjective:      Patient ID: Jacqueline Miller is a 67 y.o. female.    Chief Complaint: Follow-up and Hypertension    Problem List Items Addressed This Visit        Cardiac/Vascular    AICD lead malfunction (Chronic)    Implantable cardioverter-defibrillator (ICD) in situ - Primary    Overview     Has been sounding off/ triggering inappropriately. Pt has been to ED 4 times with this issue since last visit. [employer] has been contacted and Dr. Latrell Raymond has been notified. Dr. Raymond has angiogram via [facility] scheduled for 7/11/2022.            Hypertension    Overview     The patient presents with essential hypertension.  The patient is tolerating the medication well and is in excellent compliance.  The patient is experiencing no side effects.  Counseling was offered regarding low salt diets.  The patient has a reduced salt intake.  The patient denies chest pain, palpitations, shortness of breath, dyspnea on exertion, left or murmur neck pain, nausea, vomiting, diaphoresis, paroxysmal nocturnal dyspnea, and orthopnea.       Hypertension Medications             carvediloL (COREG) 12.5 MG tablet Take 1 tablet by mouth twice daily    ENTRESTO 24-26 mg per tablet Take 1 tablet by mouth 2 (two) times daily.    nitroGLYCERIN (NITROSTAT) 0.4 MG SL tablet Place 1 tablet (0.4 mg total) under the tongue every 5 (five) minutes as needed for Chest pain.        Will contact  Dr. Raymond' nurse  To inform them that patient's bp elevated and pulse 70. How would he like me to adjust her Bp.? Entresto can be increased or can start  amlodipine. Which  Antihypertensive does he prefer; [facility] on 7/11/2022. May also try spironolactone.               CAD (coronary artery disease)    Overview     The  patient  Agrees to sign medical release for today's labs from Dr. Latrell Raymond and please request [facility] report on 7/11/2022  also nursing staff to contact Dr. Raymond' office requesting cardiology clearance in 2-3 weeks for screening colonoscopy on her follow visit with him.              Other Visit Diagnoses     Screening for malignant neoplasm of colon        Relevant Orders    Ambulatory referral/consult to Gastroenterology          The patient's Health Maintenance was reviewed and the following appears to be due:   Health Maintenance Due   Topic Date Due    Hepatitis C Screening  Never done    Pneumococcal Vaccines (Age 65+) (1 - PCV) Never done    TETANUS VACCINE  10/27/1978    DEXA Scan  Never done    Colorectal Cancer Screening  Never done    Shingles Vaccine (1 of 2) Never done    COVID-19 Vaccine (4 - Booster for Moderna series) 05/24/2022       (PHQ-2 data if performed)  Depression Patient Health Questionnaire 5/27/2022   Over the last two weeks how often have you been bothered by little interest or pleasure in doing things Not at all   Over the last two weeks how often have you been bothered by feeling down, depressed or hopeless Not at all   PHQ-2 Total Score 0     (PHQ-9 data if performed)  No flowsheet data found.  (MINA data if performed)  No flowsheet data found.     Past Medical History:  Past Medical History:   Diagnosis Date    SRUTHI positive     CAD (coronary artery disease)     CKD (chronic kidney disease)     Degenerative joint disease     Fibromyalgia     Glomus tympanicum tumor     Hepatitis C     Hypertension     ICD (implantable cardioverter-defibrillator) in place     Left sided sciatica     Noncompliance with medication regimen     Nonischemic cardiomyopathy     Seborrheic keratoses     Tinea corporis     Undifferentiated connective tissue disease     Ventricular fibrillation     Vitamin D deficiency      Past Surgical History:   Procedure Laterality Date    CARDIAC DEFIBRILLATOR PLACEMENT      DERMOID CYST  EXCISION      HYSTERECTOMY       Review of patient's allergies indicates:    Allergen Reactions    Ketorolac      Other reaction(s): Tongue finding  slurred speech    Penicillins Hives    Cholecalciferol (vitamin d3) Edema     Other reaction(s): Hand and joint of hands swelling     Current Outpatient Medications on File Prior to Visit   Medication Sig Dispense Refill    acyclovir (ZOVIRAX) 800 MG Tab Take 800 mg by mouth 5 (five) times daily.      aspirin (ECOTRIN) 81 MG EC tablet Take 1 tablet by mouth once daily 90 tablet 0    carvediloL (COREG) 12.5 MG tablet Take 1 tablet by mouth twice daily 180 tablet 0    cyclobenzaprine (FLEXERIL) 10 MG tablet Take 10 mg by mouth every evening.      ENTRESTO 24-26 mg per tablet Take 1 tablet by mouth 2 (two) times daily.      hydrOXYchloroQUINE (PLAQUENIL) 200 mg tablet Take 200 mg by mouth 2 (two) times a day.      magnesium oxide (MAG-OX) 400 mg (241.3 mg magnesium) tablet Take 400 mg by mouth 2 (two) times a day.      nitroGLYCERIN (NITROSTAT) 0.4 MG SL tablet Place 1 tablet (0.4 mg total) under the tongue every 5 (five) minutes as needed for Chest pain. 100 tablet 2    prednisoLONE acetate (PRED FORTE) 1 % DrpS Apply 1 drop to eye 4 (four) times daily.      ZIRGAN 0.15 % Gel Place 1 drop into the left eye 5 (five) times daily.       No current facility-administered medications on file prior to visit.     Social History     Socioeconomic History    Marital status:    Tobacco Use    Smoking status: Never Smoker    Smokeless tobacco: Never Used     Family History   Problem Relation Age of Onset    Hypertension Mother     Hypertension Father     Kidney failure Father     Diabetes Sister        Review of Systems   Constitutional: Negative for chills, fatigue and fever.   HENT: Negative for congestion, hearing loss, rhinorrhea, sore throat and voice change.    Eyes: Negative for visual disturbance.   Respiratory: Negative for cough, shortness of breath and wheezing.    Cardiovascular: Negative for chest pain, palpitations  and leg swelling.   Gastrointestinal: Negative for abdominal pain, blood in stool, constipation, diarrhea, nausea and vomiting.   Endocrine: Negative for cold intolerance, heat intolerance, polydipsia, polyphagia and polyuria.   Genitourinary: Negative for decreased urine volume, difficulty urinating, flank pain, frequency, hematuria and urgency.   Musculoskeletal: Negative for arthralgias, gait problem, joint swelling and myalgias.   Skin: Negative for rash.   Neurological: Negative for dizziness, seizures, speech difficulty, weakness, numbness and headaches.   Hematological: Negative for adenopathy.   Psychiatric/Behavioral: Negative.  Negative for decreased concentration, dysphoric mood, hallucinations and suicidal ideas. The patient is not nervous/anxious.    All other systems reviewed and are negative.      Objective:   BP (!) 158/94 Comment: Manual  Pulse 70   Temp 98.3 °F (36.8 °C) (Oral)   Resp 18   Ht 6' (1.829 m)   Wt 123.4 kg (272 lb)   SpO2 100%   BMI 36.89 kg/m²   Physical Exam  Vitals and nursing note reviewed.   Constitutional:       General: She is not in acute distress.     Appearance: Normal appearance. She is normal weight. She is not ill-appearing.   HENT:      Head: Normocephalic and atraumatic.      Right Ear: Tympanic membrane, ear canal and external ear normal.      Left Ear: Tympanic membrane, ear canal and external ear normal.      Nose: Nose normal.      Mouth/Throat:      Mouth: Mucous membranes are moist.   Eyes:      Extraocular Movements: Extraocular movements intact.      Conjunctiva/sclera: Conjunctivae normal.      Pupils: Pupils are equal, round, and reactive to light.   Cardiovascular:      Rate and Rhythm: Normal rate and regular rhythm.      Pulses: Normal pulses.      Heart sounds: Normal heart sounds. No murmur heard.    No friction rub. No gallop.   Pulmonary:      Effort: Pulmonary effort is normal. No respiratory distress.      Breath sounds: Normal breath sounds. No  wheezing, rhonchi or rales.   Abdominal:      General: Abdomen is flat. Bowel sounds are normal.      Palpations: Abdomen is soft. There is no mass.      Tenderness: There is no abdominal tenderness. There is no guarding or rebound.      Hernia: No hernia is present.   Musculoskeletal:         General: No swelling. Normal range of motion.      Cervical back: Normal range of motion and neck supple.      Right lower leg: No edema.      Left lower leg: No edema.   Skin:     General: Skin is warm and dry.      Capillary Refill: Capillary refill takes less than 2 seconds.      Findings: No lesion or rash.   Neurological:      General: No focal deficit present.      Mental Status: She is alert and oriented to person, place, and time. Mental status is at baseline.      Cranial Nerves: No cranial nerve deficit.      Sensory: No sensory deficit.      Gait: Gait normal.   Psychiatric:         Mood and Affect: Mood normal.         Behavior: Behavior normal.         Thought Content: Thought content normal.         Judgment: Judgment normal.          Admission on 06/29/2022, Discharged on 06/30/2022   Component Date Value Ref Range Status    Sodium Level 06/29/2022 143  136 - 145 mmol/L Final    Potassium Level 06/29/2022 4.2  3.5 - 5.1 mmol/L Final    Chloride 06/29/2022 112 (A) 98 - 107 mmol/L Final    Carbon Dioxide 06/29/2022 25  23 - 31 mmol/L Final    Glucose Level 06/29/2022 123 (A) 82 - 115 mg/dL Final    Blood Urea Nitrogen 06/29/2022 12.2  9.8 - 20.1 mg/dL Final    Creatinine 06/29/2022 0.96  0.55 - 1.02 mg/dL Final    Calcium Level Total 06/29/2022 9.2  8.4 - 10.2 mg/dL Final    Protein Total 06/29/2022 6.7  5.8 - 7.6 gm/dL Final    Albumin Level 06/29/2022 3.7  3.4 - 4.8 gm/dL Final    Globulin 06/29/2022 3.0  2.4 - 3.5 gm/dL Final    Albumin/Globulin Ratio 06/29/2022 1.2  1.1 - 2.0 ratio Final    Bilirubin Total 06/29/2022 0.3  <=1.5 mg/dL Final    Alkaline Phosphatase 06/29/2022 131  40 - 150 unit/L  Final    Alanine Aminotransferase 06/29/2022 9  0 - 55 unit/L Final    Aspartate Aminotransferase 06/29/2022 11  5 - 34 unit/L Final    Estimated GFR- 06/29/2022 >60  mls/min/1.73/m2 Final    Natriuretic Peptide 06/29/2022 423.1 (A) <=100.0 pg/mL Final    Troponin-I 06/29/2022 <0.010  0.000 - 0.045 ng/mL Final    PT 06/29/2022 12.9  12.5 - 14.5 seconds Final    INR 06/29/2022 0.98  0.00 - 1.30 Final    WBC 06/29/2022 5.5  4.5 - 11.5 x10(3)/mcL Final    RBC 06/29/2022 4.00 (A) 4.20 - 5.40 x10(6)/mcL Final    Hgb 06/29/2022 11.4 (A) 12.0 - 16.0 gm/dL Final    Hct 06/29/2022 36.4 (A) 37.0 - 47.0 % Final    MCV 06/29/2022 91.0  80.0 - 94.0 fL Final    MCH 06/29/2022 28.5  27.0 - 31.0 pg Final    MCHC 06/29/2022 31.3 (A) 33.0 - 36.0 mg/dL Final    RDW 06/29/2022 13.6  11.5 - 17.0 % Final    Platelet 06/29/2022 183  130 - 400 x10(3)/mcL Final    MPV 06/29/2022 11.0 (A) 7.4 - 10.4 fL Final    Neut % 06/29/2022 52.8  % Final    Lymph % 06/29/2022 35.5  % Final    Mono % 06/29/2022 4.2  % Final    Eos % 06/29/2022 6.2  % Final    Basophil % 06/29/2022 1.1  % Final    Lymph # 06/29/2022 1.95  0.6 - 4.6 x10(3)/mcL Final    Neut # 06/29/2022 2.9  2.1 - 9.2 x10(3)/mcL Final    Mono # 06/29/2022 0.23  0.1 - 1.3 x10(3)/mcL Final    Eos # 06/29/2022 0.34  0 - 0.9 x10(3)/mcL Final    Baso # 06/29/2022 0.06  0 - 0.2 x10(3)/mcL Final    IG# 06/29/2022 0.01  0 - 0.0155 x10(3)/mcL Final    IG% 06/29/2022 0.2  0 - 0.43 % Final    NRBC% 06/29/2022 0.0  % Final      Assessment:     1. Implantable cardioverter-defibrillator (ICD) in situ    2. Coronary artery disease involving native coronary artery of native heart without angina pectoris    3. AICD lead malfunction    4. Primary hypertension    5. Screening for malignant neoplasm of colon      Plan:   I am having Jacqueline Miller maintain her carvediloL, aspirin, acyclovir, cyclobenzaprine, ZIRGAN, hydrOXYchloroQUINE, prednisoLONE acetate,  magnesium oxide, ENTRESTO, and nitroGLYCERIN.  Problem List Items Addressed This Visit        Cardiac/Vascular    AICD lead malfunction (Chronic)    Implantable cardioverter-defibrillator (ICD) in situ - Primary    Hypertension    CAD (coronary artery disease)      Other Visit Diagnoses     Screening for malignant neoplasm of colon        Relevant Orders    Ambulatory referral/consult to Gastroenterology            Medication List with Changes/Refills   Current Medications    ACYCLOVIR (ZOVIRAX) 800 MG TAB    Take 800 mg by mouth 5 (five) times daily.    ASPIRIN (ECOTRIN) 81 MG EC TABLET    Take 1 tablet by mouth once daily    CARVEDILOL (COREG) 12.5 MG TABLET    Take 1 tablet by mouth twice daily    CYCLOBENZAPRINE (FLEXERIL) 10 MG TABLET    Take 10 mg by mouth every evening.    ENTRESTO 24-26 MG PER TABLET    Take 1 tablet by mouth 2 (two) times daily.    HYDROXYCHLOROQUINE (PLAQUENIL) 200 MG TABLET    Take 200 mg by mouth 2 (two) times a day.    MAGNESIUM OXIDE (MAG-OX) 400 MG (241.3 MG MAGNESIUM) TABLET    Take 400 mg by mouth 2 (two) times a day.    NITROGLYCERIN (NITROSTAT) 0.4 MG SL TABLET    Place 1 tablet (0.4 mg total) under the tongue every 5 (five) minutes as needed for Chest pain.    PREDNISOLONE ACETATE (PRED FORTE) 1 % DRPS    Apply 1 drop to eye 4 (four) times daily.    ZIRGAN 0.15 % GEL    Place 1 drop into the left eye 5 (five) times daily.            Orders Placed This Encounter   Procedures    Ambulatory referral/consult to Gastroenterology     Standing Status:   Future     Standing Expiration Date:   7/6/2023     Referral Priority:   Routine     Referral Type:   Consultation     Referral Reason:   Specialty Services Required     Referred to Provider:   Ashley Joel III, MD     Requested Specialty:   Gastroenterology     Number of Visits Requested:   1     Previous medical history/lab work/radiology reviewed and considered during medical management decisions.   Medication list reviewed and  medication reconciliation performed.  Patient was provided  and care about his/her current diagnosis (es) and medications including risk/benefit and side effects/adverse events, over the counter medication uses/doses, home self-care and contact precautions,  and red flags and indications for when to seek immediate medical attention.   Patient was advised to continue compliance with current medication list and medical recommendations.  Recommended/ Advised continued compliance with recommended eating habits/ diets for medical conditions and exercise 150 minutes/ week (if possible) for medical condition (s).  Educational handouts and instructions on selected disease management in AVS (After Visit Summary).  All of the patient's questions were answered to patient's satisfaction.   The patient was receptive, expressed verbal understanding and agreement the above plan.    This note was created with the assistance of  M*Modal Fluency Direct voice recognition or  phone dictation. Please excuse any typographical errors that might have transpired. There may be transcription errors as a result of using this technology, however minimal effort has been made to assure accuracy of transcription, but any obvious errors or omissions should be clarified with the author of the document        Follow up with  administrative management referral to Internal Medicine or Family Medicine (as of 7/8/2022) for  medical conditions above in  In 4 wks for HTN  Jai Rosado MD

## 2022-07-28 DIAGNOSIS — Z95.810 AICD PROBLEM: Primary | ICD-10-CM

## 2022-07-28 DIAGNOSIS — T82.9XXA AICD PROBLEM: Primary | ICD-10-CM

## 2022-08-17 ENCOUNTER — OFFICE VISIT (OUTPATIENT)
Dept: CARDIAC SURGERY | Facility: CLINIC | Age: 67
End: 2022-08-17
Payer: MEDICARE

## 2022-08-17 VITALS
SYSTOLIC BLOOD PRESSURE: 122 MMHG | WEIGHT: 280 LBS | HEIGHT: 71 IN | BODY MASS INDEX: 39.2 KG/M2 | DIASTOLIC BLOOD PRESSURE: 82 MMHG

## 2022-08-17 DIAGNOSIS — T82.110A AICD LEAD MALFUNCTION: Primary | ICD-10-CM

## 2022-08-17 DIAGNOSIS — Z95.810 AICD PROBLEM: ICD-10-CM

## 2022-08-17 DIAGNOSIS — Z86.79 H/O VENTRICULAR TACHYCARDIA: ICD-10-CM

## 2022-08-17 DIAGNOSIS — T82.9XXA AICD PROBLEM: ICD-10-CM

## 2022-08-17 PROCEDURE — 99214 PR OFFICE/OUTPT VISIT, EST, LEVL IV, 30-39 MIN: ICD-10-PCS | Mod: ,,, | Performed by: THORACIC SURGERY (CARDIOTHORACIC VASCULAR SURGERY)

## 2022-08-17 PROCEDURE — 99214 OFFICE O/P EST MOD 30 MIN: CPT | Mod: ,,, | Performed by: THORACIC SURGERY (CARDIOTHORACIC VASCULAR SURGERY)

## 2022-08-17 NOTE — PROGRESS NOTES
History & Physical    SUBJECTIVE:     History of Present Illness:  The patient is presenting for evaluation of dysfunctional AICD, the lead has been dysfunctional.  The battery still have up to 8- 9 years of life left      Chief Complaint   Patient presents with    Pre-op Exam     Dr.Corwin Raymond RE: Removal of Dysfunctional AICD       Review of patient's allergies indicates:   Allergen Reactions    Ketorolac      Other reaction(s): Tongue finding  slurred speech    Penicillins Hives    Cholecalciferol (vitamin d3) Edema     Other reaction(s): Hand and joint of hands swelling    Flexeril [cyclobenzaprine]        Current Outpatient Medications   Medication Sig Dispense Refill    aspirin (ECOTRIN) 81 MG EC tablet Take 1 tablet by mouth once daily 90 tablet 0    carvediloL (COREG) 12.5 MG tablet Take 1 tablet by mouth twice daily 180 tablet 0    ENTRESTO 24-26 mg per tablet Take 1 tablet by mouth 2 (two) times daily.      nitroGLYCERIN (NITROSTAT) 0.4 MG SL tablet Place 1 tablet (0.4 mg total) under the tongue every 5 (five) minutes as needed for Chest pain. 100 tablet 2    prednisoLONE acetate (PRED FORTE) 1 % DrpS Apply 1 drop to eye 4 (four) times daily.      ZIRGAN 0.15 % Gel Place 1 drop into the left eye 5 (five) times daily.      acyclovir (ZOVIRAX) 800 MG Tab Take 800 mg by mouth 5 (five) times daily.      cyclobenzaprine (FLEXERIL) 10 MG tablet Take 10 mg by mouth every evening.      hydrOXYchloroQUINE (PLAQUENIL) 200 mg tablet Take 200 mg by mouth 2 (two) times a day.      magnesium oxide (MAG-OX) 400 mg (241.3 mg magnesium) tablet Take 400 mg by mouth 2 (two) times a day.       No current facility-administered medications for this visit.       Past Medical History:   Diagnosis Date    SRUTHI positive     CAD (coronary artery disease)     CKD (chronic kidney disease)     Degenerative joint disease     Fibromyalgia     Glomus tympanicum tumor     Hepatitis C     Hypertension     ICD  "(implantable cardioverter-defibrillator) in place     Left sided sciatica     Noncompliance with medication regimen     Nonischemic cardiomyopathy     Seborrheic keratoses     Tinea corporis     Undifferentiated connective tissue disease     Ventricular fibrillation     Vitamin D deficiency      Past Surgical History:   Procedure Laterality Date    CARDIAC DEFIBRILLATOR PLACEMENT      DERMOID CYST  EXCISION      HYSTERECTOMY       Family History   Problem Relation Age of Onset    Hypertension Mother     Hypertension Father     Kidney failure Father     Diabetes Sister      Social History     Tobacco Use    Smoking status: Never Smoker    Smokeless tobacco: Never Used        Review of Systems:  Review of Systems   Constitutional: Negative.    HENT: Negative.    Eyes: Negative.    Respiratory: Negative.    Cardiovascular: Negative.    Gastrointestinal: Negative.    Endocrine: Negative.    Genitourinary: Negative.    Musculoskeletal: Negative.    Skin: Negative.    Allergic/Immunologic: Negative.    Neurological: Negative.    Hematological: Negative.    Psychiatric/Behavioral: Negative.        OBJECTIVE:     Vital Signs (Most Recent)  BP: 122/82 (08/17/22 0946)  5' 11" (1.803 m)  127 kg (280 lb)     Physical Exam:  Physical Exam  Vitals reviewed.   Constitutional:       Appearance: Normal appearance.   HENT:      Head: Normocephalic and atraumatic.      Nose: Nose normal.      Mouth/Throat:      Mouth: Mucous membranes are dry.      Pharynx: Oropharynx is clear.   Eyes:      Extraocular Movements: Extraocular movements intact.      Conjunctiva/sclera: Conjunctivae normal.      Pupils: Pupils are equal, round, and reactive to light.   Cardiovascular:      Rate and Rhythm: Normal rate and regular rhythm.      Pulses: Normal pulses.   Pulmonary:      Effort: Pulmonary effort is normal.      Breath sounds: Normal breath sounds.   Abdominal:      General: Abdomen is flat.      Palpations: Abdomen is soft. "   Musculoskeletal:         General: Normal range of motion.      Cervical back: Neck supple.   Skin:     General: Skin is warm and dry.   Neurological:      General: No focal deficit present.   Psychiatric:         Mood and Affect: Mood normal.         Laboratory:  None      Diagnostic Results:  None      ASSESSMENT/PLAN:     The patient will benefit from AICD laser assisted removal and possible replacement.  She understands that she might not be able to remove the AICD leads and might have to simply put a new 1. She elected to proceed.  I do not plan to replace the generator at this time.  She understands risk of bleeding infection and potential need for sternotomy

## 2022-08-18 ENCOUNTER — TELEPHONE (OUTPATIENT)
Dept: CARDIAC SURGERY | Facility: CLINIC | Age: 67
End: 2022-08-18
Payer: MEDICARE

## 2022-08-18 NOTE — TELEPHONE ENCOUNTER
Called pt back to try and schedule surgery date.  Pt was in office yesterday and needed to speak to daughter.  States she didn't really get a chance to do that, but wants to wait until September.  States she will call me back after speaking to daughter.

## 2022-09-01 ENCOUNTER — OFFICE VISIT (OUTPATIENT)
Dept: FAMILY MEDICINE | Facility: CLINIC | Age: 67
End: 2022-09-01
Payer: MEDICARE

## 2022-09-01 VITALS
DIASTOLIC BLOOD PRESSURE: 74 MMHG | RESPIRATION RATE: 18 BRPM | TEMPERATURE: 98 F | WEIGHT: 278 LBS | SYSTOLIC BLOOD PRESSURE: 130 MMHG | BODY MASS INDEX: 38.92 KG/M2 | HEART RATE: 63 BPM | OXYGEN SATURATION: 100 % | HEIGHT: 71 IN

## 2022-09-01 DIAGNOSIS — Z09 FOLLOW UP: Primary | ICD-10-CM

## 2022-09-01 PROCEDURE — 99215 OFFICE O/P EST HI 40 MIN: CPT | Mod: PBBFAC | Performed by: NURSE PRACTITIONER

## 2022-09-01 PROCEDURE — 99213 OFFICE O/P EST LOW 20 MIN: CPT | Mod: S$PBB,,, | Performed by: NURSE PRACTITIONER

## 2022-09-01 PROCEDURE — 99213 PR OFFICE/OUTPT VISIT, EST, LEVL III, 20-29 MIN: ICD-10-PCS | Mod: S$PBB,,, | Performed by: NURSE PRACTITIONER

## 2022-09-01 NOTE — ASSESSMENT & PLAN NOTE
Keep follow-up appointment with vascular surgery on September 12, 2022 to replace AICD.  Continue medications as prescribed.  Follow-up in 3 months for wellness and fasting blood work.  Verbalized understanding.

## 2022-09-01 NOTE — PROGRESS NOTES
"Patient Name: Jacqueline Miller   : 1955  MRN: [id number]     SUBJECTIVE:  Jacqueline Miller is a 67 y.o. female here for Follow-up (Angiogram)  .    67-year-old female presents to the clinic follow-up after her visit with the cardiovascular surgeon regarding her AICD.  Patient states she is nervous about the procedure that is scheduled on 2022.  Patient is aware of blood work to be done prior to surgery.  Patient has been having issue with the AICD where it it activated frequently without unknown reason.  Patient state day they turned it off " defibrillation part".  Patient stated that are using  laser assisted device for AICD removal and possible replacement and potential need for sternotomy.     Patient states she is compliant with her medication.  Patient denies chest pain, shortness of breath, dyspnea on exertion, palpitations, peripheral edema, abdominal pain, nausea, vomiting, diarrhea, constipation, fatigue, fever, chills, dysuria,  hematuria, melena, or hematochezia.     Discussed care gaps, patient will make decision on her next follow-up appointment.  Patient to return to clinic in 3 months for follow-up with fasting blood work.  Patient declined any needs for medication refills at this time.  Affected patient to come back to clinic sooner if needed.  Instructed patient to call 911 if symptom worsens or go to nearest emergency department.        Visit on 2022  Pt is a  66 Years old female with a past medical history of HTN, AICD implementation s/p VT/VF arrest in , irregular heartbeat, CAD followed by  cardiologist Dr. 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 Henderson, and vitamin D deficiency.   Patient states that she is adhering to a low fat, low sodium, and " "low carbohydrate eating habit.  Patient states that she is compliant with her medication.   Pt states BP has been controlled. Dr. Raymond, per pt, states she is doing well in regards to CAD. Pt decided not to start the Plaquenil and cyclobenzaprine for mixed connective tissue disease and fibromyalgia. Pt continues to complain about ill fitted dentures that were taken back from a company paid by her insurance leaving her with no dentures.                   ALLERGIES:   Review of patient's allergies indicates:   Allergen Reactions    Ketorolac      Other reaction(s): Tongue finding  slurred speech    Penicillins Hives    Cholecalciferol (vitamin d3) Edema     Other reaction(s): Hand and joint of hands swelling    Flexeril [cyclobenzaprine]          ROS:  Review of Systems   Psychiatric/Behavioral:  The patient is nervous/anxious.         Regarding upcoming surgery to replace her AICD/pacer.    All other systems reviewed and are negative.      OBJECTIVE:  Vital signs  Vitals:    09/01/22 1244   BP: 130/74   Pulse: 63   Resp: 18   Temp: 97.9 °F (36.6 °C)   TempSrc: Oral   SpO2: 100%   Weight: 126.1 kg (278 lb)   Height: 5' 11" (1.803 m)      Body mass index is 38.77 kg/m².    PHYSICAL EXAM:   Physical Exam  Vitals and nursing note reviewed.   Constitutional:       General: She is not in acute distress.     Appearance: Normal appearance. She is obese. She is not ill-appearing, toxic-appearing or diaphoretic.   HENT:      Head: Normocephalic and atraumatic.      Nose: Nose normal. No congestion or rhinorrhea.      Mouth/Throat:      Mouth: Mucous membranes are moist.      Comments: Uvula midline.  Eyes:      Extraocular Movements: Extraocular movements intact.      Pupils: Pupils are equal, round, and reactive to light.   Cardiovascular:      Rate and Rhythm: Normal rate. Rhythm irregular.      Pulses: Normal pulses.      Heart sounds: Normal heart sounds.      Comments: AICD device noted to left chest wall.  No wounds.  " No erythema.  Pulmonary:      Effort: Pulmonary effort is normal.      Breath sounds: Normal breath sounds. No wheezing.   Musculoskeletal:         General: Normal range of motion.      Cervical back: Normal range of motion and neck supple.   Skin:     General: Skin is warm.      Capillary Refill: Capillary refill takes less than 2 seconds.      Findings: No rash.   Neurological:      General: No focal deficit present.      Mental Status: She is alert and oriented to person, place, and time. Mental status is at baseline.   Psychiatric:         Mood and Affect: Mood normal.         Behavior: Behavior normal.         Thought Content: Thought content normal.         Judgment: Judgment normal.        ASSESSMENT/PLAN:  1. Follow up  Assessment & Plan:  Keep follow-up appointment with vascular surgery on September 12, 2022 to replace AICD.  Continue medications as prescribed.  Follow-up in 3 months for wellness and fasting blood work.  Verbalized understanding.           RESULTS:    Recent Results (from the past 2016 hour(s))   Comprehensive metabolic panel    Collection Time: 06/29/22  9:56 PM   Result Value Ref Range    Sodium Level 143 136 - 145 mmol/L    Potassium Level 4.2 3.5 - 5.1 mmol/L    Chloride 112 (H) 98 - 107 mmol/L    Carbon Dioxide 25 23 - 31 mmol/L    Glucose Level 123 (H) 82 - 115 mg/dL    Blood Urea Nitrogen 12.2 9.8 - 20.1 mg/dL    Creatinine 0.96 0.55 - 1.02 mg/dL    Calcium Level Total 9.2 8.4 - 10.2 mg/dL    Protein Total 6.7 5.8 - 7.6 gm/dL    Albumin Level 3.7 3.4 - 4.8 gm/dL    Globulin 3.0 2.4 - 3.5 gm/dL    Albumin/Globulin Ratio 1.2 1.1 - 2.0 ratio    Bilirubin Total 0.3 <=1.5 mg/dL    Alkaline Phosphatase 131 40 - 150 unit/L    Alanine Aminotransferase 9 0 - 55 unit/L    Aspartate Aminotransferase 11 5 - 34 unit/L    Estimated GFR- >60 mls/min/1.73/m2   Brain natriuretic peptide    Collection Time: 06/29/22  9:56 PM   Result Value Ref Range    Natriuretic Peptide 423.1 (H)  <=100.0 pg/mL   Troponin I    Collection Time: 06/29/22  9:56 PM   Result Value Ref Range    Troponin-I <0.010 0.000 - 0.045 ng/mL   Protime-INR    Collection Time: 06/29/22  9:56 PM   Result Value Ref Range    PT 12.9 12.5 - 14.5 seconds    INR 0.98 0.00 - 1.30   CBC with Differential    Collection Time: 06/29/22  9:56 PM   Result Value Ref Range    WBC 5.5 4.5 - 11.5 x10(3)/mcL    RBC 4.00 (L) 4.20 - 5.40 x10(6)/mcL    Hgb 11.4 (L) 12.0 - 16.0 gm/dL    Hct 36.4 (L) 37.0 - 47.0 %    MCV 91.0 80.0 - 94.0 fL    MCH 28.5 27.0 - 31.0 pg    MCHC 31.3 (L) 33.0 - 36.0 mg/dL    RDW 13.6 11.5 - 17.0 %    Platelet 183 130 - 400 x10(3)/mcL    MPV 11.0 (H) 7.4 - 10.4 fL    Neut % 52.8 %    Lymph % 35.5 %    Mono % 4.2 %    Eos % 6.2 %    Basophil % 1.1 %    Lymph # 1.95 0.6 - 4.6 x10(3)/mcL    Neut # 2.9 2.1 - 9.2 x10(3)/mcL    Mono # 0.23 0.1 - 1.3 x10(3)/mcL    Eos # 0.34 0 - 0.9 x10(3)/mcL    Baso # 0.06 0 - 0.2 x10(3)/mcL    IG# 0.01 0 - 0.0155 x10(3)/mcL    IG% 0.2 0 - 0.43 %    NRBC% 0.0 %        Follow Up:  Follow up in about 3 months (around 12/1/2022).        Previous medical history/lab work/radiology reviewed and considered during medical management decisions.   Medication list reviewed and medication reconciliation performed.  Patient was provided  and care about his/her current diagnosis (es) and medications including risk/benefit and side effects/adverse events, over the counter medication uses/doses, home self-care and contact precautions,  and red flags and indications for when to seek immediate medical attention.   Patient was advised to continue compliance with current medication list and medical recommendations.  Patient dvised continued compliance with recommended eating habits/ diets for medical conditions and exercise 150 minutes/ week (if possible) for medical condition (s).  Educational handouts and instructions on selected disease management in AVS (After Visit Summary).    All of the patient's  questions were answered to patient's satisfaction.   The patient was receptive, expressed verbal understanding and agreement the above plan.       This note was created with the assistance of a voice recognition software or phone dictation. There may be transcription errors as a result of using this technology however minimal. Effort has been made to assure accuracy of transcription but any obvious errors or omissions should be clarified with the author of the document

## 2022-09-08 ENCOUNTER — HOSPITAL ENCOUNTER (OUTPATIENT)
Dept: RADIOLOGY | Facility: HOSPITAL | Age: 67
Discharge: HOME | End: 2022-09-08
Attending: THORACIC SURGERY (CARDIOTHORACIC VASCULAR SURGERY)
Payer: MEDICARE

## 2022-09-08 DIAGNOSIS — Z95.810 AICD PROBLEM: ICD-10-CM

## 2022-09-08 DIAGNOSIS — Z86.79 H/O VENTRICULAR TACHYCARDIA: ICD-10-CM

## 2022-09-08 DIAGNOSIS — T82.110A AICD LEAD MALFUNCTION: ICD-10-CM

## 2022-09-08 DIAGNOSIS — T82.9XXA AICD PROBLEM: ICD-10-CM

## 2022-09-08 PROCEDURE — 71046 X-RAY EXAM CHEST 2 VIEWS: CPT | Mod: TC

## 2022-09-08 RX ORDER — ACETAMINOPHEN 500 MG
500 TABLET ORAL EVERY 6 HOURS PRN
COMMUNITY

## 2022-09-08 RX ORDER — DORZOLAMIDE HYDROCHLORIDE AND TIMOLOL MALEATE 20; 5 MG/ML; MG/ML
1 SOLUTION/ DROPS OPHTHALMIC 2 TIMES DAILY
COMMUNITY

## 2022-09-09 ENCOUNTER — ANESTHESIA EVENT (OUTPATIENT)
Dept: SURGERY | Facility: HOSPITAL | Age: 67
DRG: 227 | End: 2022-09-09
Payer: MEDICARE

## 2022-09-12 ENCOUNTER — ANESTHESIA (OUTPATIENT)
Dept: SURGERY | Facility: HOSPITAL | Age: 67
DRG: 227 | End: 2022-09-12
Payer: MEDICARE

## 2022-09-12 ENCOUNTER — HOSPITAL ENCOUNTER (INPATIENT)
Facility: HOSPITAL | Age: 67
LOS: 3 days | Discharge: HOME HEALTH | DRG: 227 | End: 2022-09-15
Attending: THORACIC SURGERY (CARDIOTHORACIC VASCULAR SURGERY) | Admitting: THORACIC SURGERY (CARDIOTHORACIC VASCULAR SURGERY)
Payer: MEDICARE

## 2022-09-12 DIAGNOSIS — Z95.810 AICD PROBLEM: ICD-10-CM

## 2022-09-12 DIAGNOSIS — Z86.79 H/O VENTRICULAR TACHYCARDIA: ICD-10-CM

## 2022-09-12 DIAGNOSIS — T82.9XXA AICD PROBLEM: ICD-10-CM

## 2022-09-12 DIAGNOSIS — T82.110A AICD LEAD MALFUNCTION: ICD-10-CM

## 2022-09-12 LAB
GROUP & RH: NORMAL
INDIRECT COOMBS GEL: NORMAL

## 2022-09-12 PROCEDURE — 99499 NO LOS: ICD-10-PCS | Mod: ,,,

## 2022-09-12 PROCEDURE — C1777 LEAD, AICD, ENDO SINGLE COIL: HCPCS | Performed by: THORACIC SURGERY (CARDIOTHORACIC VASCULAR SURGERY)

## 2022-09-12 PROCEDURE — 33241 PR RMV PULSE GENERATOR,SNGL/DUAL: ICD-10-PCS | Mod: 51,,, | Performed by: THORACIC SURGERY (CARDIOTHORACIC VASCULAR SURGERY)

## 2022-09-12 PROCEDURE — 33244 PR RMV PACER/ELECTRD,TRANSVEN EXTRCT: ICD-10-PCS | Mod: 51,,, | Performed by: THORACIC SURGERY (CARDIOTHORACIC VASCULAR SURGERY)

## 2022-09-12 PROCEDURE — 36415 COLL VENOUS BLD VENIPUNCTURE: CPT | Performed by: THORACIC SURGERY (CARDIOTHORACIC VASCULAR SURGERY)

## 2022-09-12 PROCEDURE — 71000015 HC POSTOP RECOV 1ST HR: Performed by: THORACIC SURGERY (CARDIOTHORACIC VASCULAR SURGERY)

## 2022-09-12 PROCEDURE — 25000003 PHARM REV CODE 250: Performed by: PHYSICIAN ASSISTANT

## 2022-09-12 PROCEDURE — 33244 REMOVE ELCTRD TRANSVENOUSLY: CPT | Mod: 51,,, | Performed by: THORACIC SURGERY (CARDIOTHORACIC VASCULAR SURGERY)

## 2022-09-12 PROCEDURE — 63600175 PHARM REV CODE 636 W HCPCS: Performed by: NURSE ANESTHETIST, CERTIFIED REGISTERED

## 2022-09-12 PROCEDURE — C1894 INTRO/SHEATH, NON-LASER: HCPCS | Performed by: THORACIC SURGERY (CARDIOTHORACIC VASCULAR SURGERY)

## 2022-09-12 PROCEDURE — 37000008 HC ANESTHESIA 1ST 15 MINUTES: Performed by: THORACIC SURGERY (CARDIOTHORACIC VASCULAR SURGERY)

## 2022-09-12 PROCEDURE — 71000039 HC RECOVERY, EACH ADD'L HOUR: Performed by: THORACIC SURGERY (CARDIOTHORACIC VASCULAR SURGERY)

## 2022-09-12 PROCEDURE — 25000003 PHARM REV CODE 250: Performed by: NURSE ANESTHETIST, CERTIFIED REGISTERED

## 2022-09-12 PROCEDURE — 27800903 OPTIME MED/SURG SUP & DEVICES OTHER IMPLANTS: Performed by: THORACIC SURGERY (CARDIOTHORACIC VASCULAR SURGERY)

## 2022-09-12 PROCEDURE — 36000707: Performed by: THORACIC SURGERY (CARDIOTHORACIC VASCULAR SURGERY)

## 2022-09-12 PROCEDURE — C1721 AICD, DUAL CHAMBER: HCPCS | Performed by: THORACIC SURGERY (CARDIOTHORACIC VASCULAR SURGERY)

## 2022-09-12 PROCEDURE — 27201423 OPTIME MED/SURG SUP & DEVICES STERILE SUPPLY: Performed by: THORACIC SURGERY (CARDIOTHORACIC VASCULAR SURGERY)

## 2022-09-12 PROCEDURE — 37000009 HC ANESTHESIA EA ADD 15 MINS: Performed by: THORACIC SURGERY (CARDIOTHORACIC VASCULAR SURGERY)

## 2022-09-12 PROCEDURE — 99499 UNLISTED E&M SERVICE: CPT | Mod: ,,,

## 2022-09-12 PROCEDURE — 63600175 PHARM REV CODE 636 W HCPCS

## 2022-09-12 PROCEDURE — 36000706: Performed by: THORACIC SURGERY (CARDIOTHORACIC VASCULAR SURGERY)

## 2022-09-12 PROCEDURE — 86850 RBC ANTIBODY SCREEN: CPT | Performed by: THORACIC SURGERY (CARDIOTHORACIC VASCULAR SURGERY)

## 2022-09-12 PROCEDURE — C1892 INTRO/SHEATH,FIXED,PEEL-AWAY: HCPCS | Performed by: THORACIC SURGERY (CARDIOTHORACIC VASCULAR SURGERY)

## 2022-09-12 PROCEDURE — 11000001 HC ACUTE MED/SURG PRIVATE ROOM

## 2022-09-12 PROCEDURE — 63600175 PHARM REV CODE 636 W HCPCS: Performed by: THORACIC SURGERY (CARDIOTHORACIC VASCULAR SURGERY)

## 2022-09-12 PROCEDURE — 33241 REMOVE PULSE GENERATOR: CPT | Mod: 51,,, | Performed by: THORACIC SURGERY (CARDIOTHORACIC VASCULAR SURGERY)

## 2022-09-12 PROCEDURE — 71000033 HC RECOVERY, INTIAL HOUR: Performed by: THORACIC SURGERY (CARDIOTHORACIC VASCULAR SURGERY)

## 2022-09-12 PROCEDURE — 33249 PR ICD INSERT SNGL/DUAL W/LEADS: ICD-10-PCS | Mod: ,,, | Performed by: THORACIC SURGERY (CARDIOTHORACIC VASCULAR SURGERY)

## 2022-09-12 PROCEDURE — 33249 INSJ/RPLCMT DEFIB W/LEAD(S): CPT | Mod: ,,, | Performed by: THORACIC SURGERY (CARDIOTHORACIC VASCULAR SURGERY)

## 2022-09-12 PROCEDURE — C2629 INTRO/SHEATH, LASER: HCPCS | Performed by: THORACIC SURGERY (CARDIOTHORACIC VASCULAR SURGERY)

## 2022-09-12 PROCEDURE — 21400001 HC TELEMETRY ROOM

## 2022-09-12 DEVICE — LEAD 6935M62 QUATTRO SECURE S MRI US
Type: IMPLANTABLE DEVICE | Site: HEART | Status: FUNCTIONAL
Brand: SPRINT QUATTRO SECURE S MRI™ SURESCAN™

## 2022-09-12 DEVICE — ENVELOPE CMRM6133 ABSORB LRG MR
Type: IMPLANTABLE DEVICE | Site: HEART | Status: FUNCTIONAL
Brand: TYRX™

## 2022-09-12 DEVICE — ICD-VR DVFB1D4 VISIA AF MRI US DF4
Type: IMPLANTABLE DEVICE | Site: HEART | Status: FUNCTIONAL
Brand: VISIA AF MRI™ VR SURESCAN™

## 2022-09-12 RX ORDER — HYDRALAZINE HYDROCHLORIDE 20 MG/ML
INJECTION INTRAMUSCULAR; INTRAVENOUS
Status: COMPLETED
Start: 2022-09-12 | End: 2022-09-12

## 2022-09-12 RX ORDER — PROPOFOL 10 MG/ML
VIAL (ML) INTRAVENOUS
Status: DISCONTINUED | OUTPATIENT
Start: 2022-09-12 | End: 2022-09-12

## 2022-09-12 RX ORDER — HYDROMORPHONE HYDROCHLORIDE 2 MG/ML
0.2 INJECTION, SOLUTION INTRAMUSCULAR; INTRAVENOUS; SUBCUTANEOUS EVERY 5 MIN PRN
Status: DISCONTINUED | OUTPATIENT
Start: 2022-09-12 | End: 2022-09-12

## 2022-09-12 RX ORDER — MUPIROCIN 20 MG/G
OINTMENT TOPICAL 2 TIMES DAILY
Status: DISCONTINUED | OUTPATIENT
Start: 2022-09-12 | End: 2022-09-15 | Stop reason: HOSPADM

## 2022-09-12 RX ORDER — HYDROMORPHONE HYDROCHLORIDE 2 MG/ML
INJECTION, SOLUTION INTRAMUSCULAR; INTRAVENOUS; SUBCUTANEOUS
Status: COMPLETED
Start: 2022-09-12 | End: 2022-09-12

## 2022-09-12 RX ORDER — HYDROCODONE BITARTRATE AND ACETAMINOPHEN 5; 325 MG/1; MG/1
1 TABLET ORAL EVERY 4 HOURS PRN
Status: DISCONTINUED | OUTPATIENT
Start: 2022-09-12 | End: 2022-09-15 | Stop reason: HOSPADM

## 2022-09-12 RX ORDER — MIDAZOLAM HYDROCHLORIDE 1 MG/ML
INJECTION INTRAMUSCULAR; INTRAVENOUS
Status: DISCONTINUED | OUTPATIENT
Start: 2022-09-12 | End: 2022-09-12

## 2022-09-12 RX ORDER — LIDOCAINE HYDROCHLORIDE 20 MG/ML
INJECTION, SOLUTION EPIDURAL; INFILTRATION; INTRACAUDAL; PERINEURAL
Status: DISCONTINUED | OUTPATIENT
Start: 2022-09-12 | End: 2022-09-12

## 2022-09-12 RX ORDER — ONDANSETRON 2 MG/ML
INJECTION INTRAMUSCULAR; INTRAVENOUS
Status: DISCONTINUED | OUTPATIENT
Start: 2022-09-12 | End: 2022-09-12

## 2022-09-12 RX ORDER — HYDROMORPHONE HYDROCHLORIDE 2 MG/ML
0.5 INJECTION, SOLUTION INTRAMUSCULAR; INTRAVENOUS; SUBCUTANEOUS EVERY 5 MIN PRN
Status: DISCONTINUED | OUTPATIENT
Start: 2022-09-12 | End: 2022-09-12

## 2022-09-12 RX ORDER — ROCURONIUM BROMIDE 10 MG/ML
INJECTION, SOLUTION INTRAVENOUS
Status: DISCONTINUED | OUTPATIENT
Start: 2022-09-12 | End: 2022-09-12

## 2022-09-12 RX ORDER — HEPARIN SODIUM 1000 [USP'U]/ML
INJECTION, SOLUTION INTRAVENOUS; SUBCUTANEOUS
Status: DISCONTINUED | OUTPATIENT
Start: 2022-09-12 | End: 2022-09-12 | Stop reason: HOSPADM

## 2022-09-12 RX ORDER — VANCOMYCIN HCL IN 5 % DEXTROSE 1G/250ML
1000 PLASTIC BAG, INJECTION (ML) INTRAVENOUS ONCE
Status: COMPLETED | OUTPATIENT
Start: 2022-09-12 | End: 2022-09-12

## 2022-09-12 RX ORDER — ONDANSETRON 2 MG/ML
4 INJECTION INTRAMUSCULAR; INTRAVENOUS ONCE
Status: DISCONTINUED | OUTPATIENT
Start: 2022-09-12 | End: 2022-09-12

## 2022-09-12 RX ORDER — PHENYLEPHRINE HYDROCHLORIDE 10 MG/ML
INJECTION INTRAVENOUS
Status: DISCONTINUED | OUTPATIENT
Start: 2022-09-12 | End: 2022-09-12

## 2022-09-12 RX ORDER — DIPHENHYDRAMINE HYDROCHLORIDE 50 MG/ML
25 INJECTION INTRAMUSCULAR; INTRAVENOUS EVERY 6 HOURS PRN
Status: DISCONTINUED | OUTPATIENT
Start: 2022-09-12 | End: 2022-09-12

## 2022-09-12 RX ORDER — MEPERIDINE HYDROCHLORIDE 25 MG/ML
12.5 INJECTION INTRAMUSCULAR; INTRAVENOUS; SUBCUTANEOUS ONCE
Status: DISCONTINUED | OUTPATIENT
Start: 2022-09-12 | End: 2022-09-12

## 2022-09-12 RX ORDER — DEXAMETHASONE SODIUM PHOSPHATE 4 MG/ML
INJECTION, SOLUTION INTRA-ARTICULAR; INTRALESIONAL; INTRAMUSCULAR; INTRAVENOUS; SOFT TISSUE
Status: DISCONTINUED | OUTPATIENT
Start: 2022-09-12 | End: 2022-09-12

## 2022-09-12 RX ORDER — CEFAZOLIN SODIUM 1 G/3ML
INJECTION, POWDER, FOR SOLUTION INTRAMUSCULAR; INTRAVENOUS
Status: DISCONTINUED | OUTPATIENT
Start: 2022-09-12 | End: 2022-09-12 | Stop reason: HOSPADM

## 2022-09-12 RX ORDER — FENTANYL CITRATE 50 UG/ML
INJECTION, SOLUTION INTRAMUSCULAR; INTRAVENOUS
Status: DISCONTINUED | OUTPATIENT
Start: 2022-09-12 | End: 2022-09-12

## 2022-09-12 RX ORDER — ACETAMINOPHEN 10 MG/ML
INJECTION, SOLUTION INTRAVENOUS
Status: DISCONTINUED
Start: 2022-09-12 | End: 2022-09-12 | Stop reason: WASHOUT

## 2022-09-12 RX ORDER — CARVEDILOL 12.5 MG/1
12.5 TABLET ORAL 2 TIMES DAILY
Status: DISCONTINUED | OUTPATIENT
Start: 2022-09-12 | End: 2022-09-15 | Stop reason: HOSPADM

## 2022-09-12 RX ADMIN — MIDAZOLAM HYDROCHLORIDE 2 MG: 1 INJECTION, SOLUTION INTRAMUSCULAR; INTRAVENOUS at 10:09

## 2022-09-12 RX ADMIN — PHENYLEPHRINE HYDROCHLORIDE 200 MCG: 10 INJECTION INTRAVENOUS at 11:09

## 2022-09-12 RX ADMIN — PHENYLEPHRINE HYDROCHLORIDE 35 MCG/MIN: 10 INJECTION INTRAVENOUS at 11:09

## 2022-09-12 RX ADMIN — ROCURONIUM BROMIDE 10 MG: 10 INJECTION, SOLUTION INTRAVENOUS at 11:09

## 2022-09-12 RX ADMIN — VANCOMYCIN HYDROCHLORIDE 1000 MG: 1 INJECTION, POWDER, LYOPHILIZED, FOR SOLUTION INTRAVENOUS at 10:09

## 2022-09-12 RX ADMIN — SACUBITRIL AND VALSARTAN 1 TABLET: 24; 26 TABLET, FILM COATED ORAL at 08:09

## 2022-09-12 RX ADMIN — MUPIROCIN: 20 OINTMENT TOPICAL at 08:09

## 2022-09-12 RX ADMIN — SUGAMMADEX 200 MG: 100 INJECTION, SOLUTION INTRAVENOUS at 12:09

## 2022-09-12 RX ADMIN — PROPOFOL 150 MG: 10 INJECTION, EMULSION INTRAVENOUS at 11:09

## 2022-09-12 RX ADMIN — VANCOMYCIN HYDROCHLORIDE 1000 MG: 1 INJECTION, POWDER, LYOPHILIZED, FOR SOLUTION INTRAVENOUS at 09:09

## 2022-09-12 RX ADMIN — CARVEDILOL 12.5 MG: 12.5 TABLET, FILM COATED ORAL at 08:09

## 2022-09-12 RX ADMIN — HYDROMORPHONE HYDROCHLORIDE 1.6 MG: 2 INJECTION INTRAMUSCULAR; INTRAVENOUS; SUBCUTANEOUS at 01:09

## 2022-09-12 RX ADMIN — HYDRALAZINE HYDROCHLORIDE 10 MG: 20 INJECTION INTRAMUSCULAR; INTRAVENOUS at 02:09

## 2022-09-12 RX ADMIN — FENTANYL CITRATE 100 MCG: 50 INJECTION, SOLUTION INTRAMUSCULAR; INTRAVENOUS at 11:09

## 2022-09-12 RX ADMIN — DEXAMETHASONE SODIUM PHOSPHATE 8 MG: 4 INJECTION, SOLUTION INTRA-ARTICULAR; INTRALESIONAL; INTRAMUSCULAR; INTRAVENOUS; SOFT TISSUE at 11:09

## 2022-09-12 RX ADMIN — ROCURONIUM BROMIDE 50 MG: 10 INJECTION, SOLUTION INTRAVENOUS at 11:09

## 2022-09-12 RX ADMIN — ONDANSETRON 4 MG: 2 INJECTION INTRAMUSCULAR; INTRAVENOUS at 11:09

## 2022-09-12 RX ADMIN — HYDROMORPHONE HYDROCHLORIDE 1.6 MG: 2 INJECTION, SOLUTION INTRAMUSCULAR; INTRAVENOUS; SUBCUTANEOUS at 01:09

## 2022-09-12 RX ADMIN — SUGAMMADEX 200 MG: 100 INJECTION, SOLUTION INTRAVENOUS at 01:09

## 2022-09-12 RX ADMIN — SODIUM CHLORIDE, SODIUM GLUCONATE, SODIUM ACETATE, POTASSIUM CHLORIDE AND MAGNESIUM CHLORIDE: 526; 502; 368; 37; 30 INJECTION, SOLUTION INTRAVENOUS at 10:09

## 2022-09-12 RX ADMIN — HYDROCODONE BITARTRATE AND ACETAMINOPHEN 1 TABLET: 5; 325 TABLET ORAL at 08:09

## 2022-09-12 RX ADMIN — LIDOCAINE HYDROCHLORIDE 100 MG: 20 INJECTION, SOLUTION EPIDURAL; INFILTRATION; INTRACAUDAL; PERINEURAL at 11:09

## 2022-09-12 NOTE — PROGRESS NOTES
Pt complaining of pain; offered medication by IV or PO and pt refused both; stated she just wanted to see her daughter; contacted the daughter to let her know pt's new room number

## 2022-09-12 NOTE — ANESTHESIA PREPROCEDURE EVALUATION
09/12/2022  Jacqueline Miller is a 67 y.o., female.  with a past medical history of HTN, AICD implementation s/p VT/VF arrest in 2015, irregular heartbeat, CAD followed by  cardiologist Dr. 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 Henderson, and vitamin D deficiency.    Here for dysfunctional AICD, the lead has been dysfunctional.  The battery is functional, not to  Be replaced.    Laser lead removal and replacement.  Pre-op Assessment    I have reviewed the Patient Summary Reports.     I have reviewed the Nursing Notes. I have reviewed the NPO Status.   I have reviewed the Medications.     Review of Systems  Anesthesia Hx:  No problems with previous Anesthesia    Social:  Non-Smoker    Cardiovascular:   Pacemaker Hypertension Past MI CAD   Orthopnea    Renal/:   Chronic Renal Disease    Hepatic/GI:   Liver Disease, Hepatitis, C    Musculoskeletal:   Arthritis     Endocrine:  Morbid Obesity / BMI > 40      Physical Exam  General: Well nourished, Cooperative, Alert and Oriented    Airway:  Mallampati: II / II  Mouth Opening: Normal  TM Distance: Normal  Tongue: Normal  Neck ROM: Normal ROM    Dental:  Edentulous    Heart:  Rate: Normal  Rhythm: Regular Rhythm        Anesthesia Plan  Type of Anesthesia, risks & benefits discussed:    Anesthesia Type: Gen ETT  Intra-op Monitoring Plan: Standard ASA Monitors and Art Line  Post Op Pain Control Plan: multimodal analgesia and IV/PO Opioids PRN  Airway Plan: Direct, Post-Induction  Informed Consent: Informed consent signed with the Patient and all parties understand the risks and agree with anesthesia plan.  All questions answered. Patient  consented to blood products? Yes  ASA Score: 3  Day of Surgery Review of History & Physical: H&P Update referred to the surgeon/provider.    Ready For Surgery From Anesthesia Perspective.     .

## 2022-09-12 NOTE — ANESTHESIA PROCEDURE NOTES
Peripheral IV Insertion    Diagnosis: I99.8 Other disorder of circulatory system    Patient location during procedure: OR    Staffing  Authorizing Provider: Susan Ordoñez MD  Performing Provider: Ata La CRNA    Anesthesiologist was present at the time of the procedure.    Preanesthetic Checklist  Completed: patient identified, IV checked, site marked, risks and benefits discussed, surgical consent, monitors and equipment checked, pre-op evaluation, timeout performed and anesthesia consent givenPeripheral IV Insertion  Skin Prep: alcohol swabs  Orientation: left  Location: antecubital  Catheter Type: peripheral IV (single lumen)  Catheter Size: 16 G Insertion Attempts: 1  Assessment  Dressing: secured with tape and tegaderm  Patient: Tolerated well  Line flushed easily.

## 2022-09-12 NOTE — OP NOTE
[facility]                       [address] [place] [address]    PATIENT NAME:      SASCHA VEGA  YOB: 1955  CSN:               [id number]  MRN:               [id number]  ADMIT DATE:        09/12/2022 07:07:00  PHYSICIAN:         Annetta Kumar MD                          OPERATIVE REPORT      DATE OF SURGERY:    09/12/2022 00:00:00    SURGEON:  Annetta Kumar MD    PREOPERATIVE DIAGNOSES:    1. Dysfunctional defibrillator lead.  2. Depleted defibrillator battery.    PROCEDURES:    1. Removal of defibrillator.    2. Laser-assisted removal of defibrillator right ventricular lead.    3. Placement of new right ventricular defibrillator lead.  4. Placement of new defibrillator.    POSTOPERATIVE DIAGNOSIS:    1. Dysfunctional defibrillator lead.  2. Depleted defibrillator battery.    ASSISTANT:  Tyler Blackwood.    BLOOD LOSS:  Minimal.    ANESTHESIA:  General.    TECHNIQUE:  Under informed consent, patient was taken to the OR, supine   position, general anesthesia was induced and therefore maintained for remainder   of procedure.  All pressure points padded equally.  The skin over the chest and   neck was prepped and draped in usual sterile fashion.  IV antibiotics were   administered.  The previous site of the defibrillator was opened.  I was able to   expose the defibrillator and delivered it out of the pocket.  The lead was   dissected all the way down to under the clavicle.  The defibrillator battery was   removed.  The lead was unwound and prepped for laser.  I was able to remove the   lead with laser under fluoroscopy guidance using a 14-Uzbek laser, a 16-Uzbek   laser, and a mechanical 13 gun.  The lead was taken out in 1 piece and sent for   pathology.  At this point, I gained access to subclavian vein upon the   direction of a wire.  Dilator, introducer were then placed over the wire.  The   dilator and the  wire were removed.  I was able to place the lead in the right   ventricle, and this was tested and had very favorable numbers.  This was secured   to the muscle layer.  Next a new defibrillator was placed.  I did move the   defibrillator a little inferiorly and laterally, as the patient was complaining   about the location and impingement on the clavicle.  The wounds were irrigated   and closed in layers of absorbable sutures.        ______________________________  MD CYRIL Santiago/JONELLE  DD:  09/12/2022  Time:  02:08PM  DT:  09/12/2022  Time:  02:34PM  Job #:  [id number]      OPERATIVE REPORT

## 2022-09-12 NOTE — ANESTHESIA PROCEDURE NOTES
Intubation    Date/Time: 9/12/2022 11:11 AM  Performed by: Ata La CRNA  Authorized by: Susan Ordoñez MD     Intubation:     Induction:  Intravenous    Intubated:  Postinduction    Mask Ventilation:  Easy with oral airway    Attempts:  1    Attempted By:  Student (WARD Telles)    Method of Intubation:  Direct    Blade:  Patton 2    Laryngeal View Grade: Grade I - full view of cords      Difficult Airway Encountered?: No      Complications:  None    Airway Device:  Oral endotracheal tube    Airway Device Size:  7.5    Style/Cuff Inflation:  Cuffed (inflated to minimal occlusive pressure)    Tube secured:  21    Secured at:  The lips    Placement Verified By:  Capnometry    Complicating Factors:  None    Findings Post-Intubation:  BS equal bilateral and atraumatic/condition of teeth unchanged

## 2022-09-12 NOTE — TRANSFER OF CARE
"Anesthesia Transfer of Care Note    Patient: Jacqueline Miller    Procedure(s) Performed: Procedure(s) (LRB):  INSERTION, PACEMAKER (N/A)  EXTRACTION, ELECTRODE LEAD (N/A)    Patient location: PACU    Anesthesia Type: general    Transport from OR: Transported from OR on room air with adequate spontaneous ventilation    Post pain: adequate analgesia    Post assessment: no apparent anesthetic complications and tolerated procedure well    Post vital signs: stable    Level of consciousness: awake    Nausea/Vomiting: no nausea/vomiting    Complications: none    Transfer of care protocol was followed      Last vitals:   Visit Vitals  BP (!) 184/84 (BP Location: Right arm, Patient Position: Lying)   Pulse 76   Temp 37 °C (98.6 °F) (Skin)   Resp 14   Ht 5' 11" (1.803 m)   Wt 123.9 kg (273 lb 2.4 oz)   SpO2 99%   Breastfeeding No   BMI 38.10 kg/m²     "

## 2022-09-12 NOTE — ANESTHESIA POSTPROCEDURE EVALUATION
Anesthesia Post Evaluation    Patient: Jacqueline Miller    Procedure(s) Performed: Procedure(s) (LRB):  INSERTION, PACEMAKER (N/A)  EXTRACTION, ELECTRODE LEAD (N/A)    Final Anesthesia Type: general      Patient location during evaluation: PACU  Patient participation: Yes- Able to Participate  Level of consciousness: awake and alert  Post-procedure vital signs: reviewed and stable  Pain management: adequate  Airway patency: patent      Anesthetic complications: no      Cardiovascular status: hemodynamically stable  Respiratory status: unassisted  Hydration status: euvolemic  Follow-up not needed.          Vitals Value Taken Time   /95 09/12/22 1405   Temp 37 °C (98.6 °F) 09/12/22 1323   Pulse 71 09/12/22 1410   Resp 9 09/12/22 1410   SpO2 100 % 09/12/22 1410   Vitals shown include unvalidated device data.      No case tracking events are documented in the log.      Pain/Esteban Score: No data recorded

## 2022-09-13 LAB
ESTROGEN SERPL-MCNC: NORMAL PG/ML
INSULIN SERPL-ACNC: NORMAL U[IU]/ML
LAB AP CLINICAL INFORMATION: NORMAL
LAB AP GROSS DESCRIPTION: NORMAL
LAB AP REPORT FOOTNOTES: NORMAL
T3RU NFR SERPL: NORMAL %

## 2022-09-13 PROCEDURE — 21400001 HC TELEMETRY ROOM

## 2022-09-13 PROCEDURE — 25000003 PHARM REV CODE 250: Performed by: PHYSICIAN ASSISTANT

## 2022-09-13 RX ADMIN — HYDROCODONE BITARTRATE AND ACETAMINOPHEN 1 TABLET: 5; 325 TABLET ORAL at 04:09

## 2022-09-13 RX ADMIN — HYDROCODONE BITARTRATE AND ACETAMINOPHEN 1 TABLET: 5; 325 TABLET ORAL at 06:09

## 2022-09-13 RX ADMIN — SACUBITRIL AND VALSARTAN 1 TABLET: 24; 26 TABLET, FILM COATED ORAL at 08:09

## 2022-09-13 RX ADMIN — HYDROCODONE BITARTRATE AND ACETAMINOPHEN 1 TABLET: 5; 325 TABLET ORAL at 10:09

## 2022-09-13 RX ADMIN — HYDROCODONE BITARTRATE AND ACETAMINOPHEN 1 TABLET: 5; 325 TABLET ORAL at 08:09

## 2022-09-13 RX ADMIN — MUPIROCIN: 20 OINTMENT TOPICAL at 08:09

## 2022-09-13 RX ADMIN — HYDROCODONE BITARTRATE AND ACETAMINOPHEN 1 TABLET: 5; 325 TABLET ORAL at 02:09

## 2022-09-13 RX ADMIN — MUPIROCIN: 20 OINTMENT TOPICAL at 09:09

## 2022-09-13 RX ADMIN — CARVEDILOL 12.5 MG: 12.5 TABLET, FILM COATED ORAL at 09:09

## 2022-09-13 RX ADMIN — CARVEDILOL 12.5 MG: 12.5 TABLET, FILM COATED ORAL at 08:09

## 2022-09-13 RX ADMIN — SACUBITRIL AND VALSARTAN 1 TABLET: 24; 26 TABLET, FILM COATED ORAL at 09:09

## 2022-09-13 NOTE — PLAN OF CARE
09/13/22 1618   Discharge Assessment   Assessment Type Discharge Planning Assessment   Confirmed/corrected address, phone number and insurance Yes   Lives With alone   Do you have help at home or someone to help you manage your care at home? Yes   Current cognitive status: Alert/Oriented   Walking or Climbing Stairs Difficulty ambulation difficulty, requires equipment   Dressing/Bathing Difficulty none   Equipment Currently Used at Home cane, straight;rollator   Do you currently have service(s) that help you manage your care at home? No   Do you take prescription medications? Yes   Do you have any problems affording any of your prescribed medications? No   Who is going to help you get home at discharge? Daughter   How do you get to doctors appointments? family or friend will provide   Are you on dialysis? No   Discharge Plan A Home with family   Discharge Plan B Home with family   DME Needed Upon Discharge  none   Discharge Plan discussed with: Patient   PCP is at [facility]. Patient reports that she will be staying with daughter for assistance at discharge.

## 2022-09-13 NOTE — CLINICAL REVIEW
67-year-old female admitted on 09/12/2022 for defibrillator removal, laser assisted removal of defibrillator right ventricular lead, placement of new right ventricular defibrillator lead, new defibrillator placement.  Although the actual procedure is not designated in inpatient setting, perioperatively, the patient requires blood pressure monitoring given significant blood pressure variations ranging from hypertensive urgency 2 hypotension.  S she continues on telemetry, goal-directed medical therapy, DVT prophylaxis.  It is expected that her hospitalization will extend beyond 2 midnights.  At the present time, she remains appropriate for inpatient LOC     Ama Esquivel MD  Utilization Management  Physician Advisor

## 2022-09-14 PROCEDURE — 99024 PR POST-OP FOLLOW-UP VISIT: ICD-10-PCS | Mod: POP,,, | Performed by: PHYSICIAN ASSISTANT

## 2022-09-14 PROCEDURE — 97162 PT EVAL MOD COMPLEX 30 MIN: CPT

## 2022-09-14 PROCEDURE — 21400001 HC TELEMETRY ROOM

## 2022-09-14 PROCEDURE — 25000003 PHARM REV CODE 250: Performed by: PHYSICIAN ASSISTANT

## 2022-09-14 PROCEDURE — 99024 POSTOP FOLLOW-UP VISIT: CPT | Mod: POP,,, | Performed by: PHYSICIAN ASSISTANT

## 2022-09-14 RX ADMIN — SACUBITRIL AND VALSARTAN 1 TABLET: 24; 26 TABLET, FILM COATED ORAL at 08:09

## 2022-09-14 RX ADMIN — MUPIROCIN: 20 OINTMENT TOPICAL at 08:09

## 2022-09-14 RX ADMIN — CARVEDILOL 12.5 MG: 12.5 TABLET, FILM COATED ORAL at 08:09

## 2022-09-14 RX ADMIN — HYDROCODONE BITARTRATE AND ACETAMINOPHEN 1 TABLET: 5; 325 TABLET ORAL at 03:09

## 2022-09-14 RX ADMIN — MUPIROCIN: 20 OINTMENT TOPICAL at 09:09

## 2022-09-14 RX ADMIN — HYDROCODONE BITARTRATE AND ACETAMINOPHEN 1 TABLET: 5; 325 TABLET ORAL at 02:09

## 2022-09-14 RX ADMIN — HYDROCODONE BITARTRATE AND ACETAMINOPHEN 1 TABLET: 5; 325 TABLET ORAL at 10:09

## 2022-09-14 NOTE — PT/OT/SLP EVAL
Physical Therapy Evaluation    Patient Name:  Jacqueline Miller   MRN:  [id number]    Recommendations:     Discharge Recommendations:  home with home health, rehabilitation facility   Discharge Equipment Recommendations: walker, rolling   Barriers to discharge:  acuity of illness    Assessment:     Jacqueline Miller is a 67 y.o. female admitted with a medical diagnosis of AICD issues.  She presents with the following impairments/functional limitations:  weakness, impaired endurance, impaired functional mobility, gait instability, impaired balance, pain.    Rehab Prognosis: Good; patient would benefit from acute skilled PT services to address these deficits and reach maximum level of function.    Recent Surgery: Procedure(s) (LRB):  INSERTION, PACEMAKER (N/A)  EXTRACTION, ELECTRODE LEAD (N/A) 2 Days Post-Op    Plan:     During this hospitalization, patient to be seen 6 x/week to address the identified rehab impairments via gait training, therapeutic activities, therapeutic exercises, neuromuscular re-education and progress toward the following goals:    Plan of Care Expires:  10/14/22    Subjective     Chief Complaint: pain  Patient/Family Comments/goals: to go home  Pain/Comfort:  Pain Rating 1: 10/10  Location - Side 1: Left  Location 1: chest  Pain Addressed 1: Nurse notified, Cessation of Activity, Reposition    Patients cultural, spiritual, Alevism conflicts given the current situation: no    Living Environment:  Pt lives w/ his family in a  home,   Prior to admission, patients level of function was independent.  Equipment used at home:  .  DME owned (not currently used): rolling walker.  Upon discharge, patient will have assistance from family.    Objective:     Communicated with RN prior to session.  Patient found up in chair with peripheral IV, PureWick  upon PT entry to room.    General Precautions: Standard, pacemaker   Orthopedic Precautions:    Braces: UE Sling  Respiratory Status: Room air    Exams:  Cognitive  Exam:  Patient is oriented to Person, Place, Time, and Situation  RLE Strength: WNL  LLE Strength: WNL    Functional Mobility:  Bed Mobility:     Sit to Supine: stand by assistance  Transfers:     Sit to Stand:  moderate assistance with rolling walker  Gait: pt demo'd a stand step from chair to bed w/ use of RW and CGA, pt limited by pain and fatigue.        AM-PAC 6 CLICK MOBILITY  Total Score:17     Patient left HOB elevated with all lines intact, call button in reach, and RN notified.    GOALS:   Multidisciplinary Problems       Physical Therapy Goals          Problem: Physical Therapy    Goal Priority Disciplines Outcome Goal Variances Interventions   Physical Therapy Goal     PT, PT/OT Ongoing, Progressing     Description: Goals to be met by: 10/14/2022     Patient will increase functional independence with mobility by performin. Sit to stand transfer with Stand-by Assistance  2. Gait  x 200 feet with Stand-by Assistance using Rolling Walker vs LRAD.                          History:     Past Medical History:   Diagnosis Date    SRUTHI positive     CAD (coronary artery disease)     CKD (chronic kidney disease)     Degenerative joint disease     Fibromyalgia     Glomus tympanicum tumor     Heart attack     Hepatitis C     Hypertension     ICD (implantable cardioverter-defibrillator) in place     Left sided sciatica     Noncompliance with medication regimen     Nonischemic cardiomyopathy     Obesity     Seborrheic keratoses     Tinea corporis     Undifferentiated connective tissue disease     Ventricular fibrillation     Vitamin D deficiency        Past Surgical History:   Procedure Laterality Date    CARDIAC DEFIBRILLATOR PLACEMENT      DENTAL SURGERY      all teeth removed    DERMOID CYST  EXCISION      HYSTERECTOMY      INSERTION OF PACEMAKER N/A 2022    Procedure: INSERTION, PACEMAKER;  Surgeon: Annetta Kumar MD;  Location: [facility];  Service: Cardiology;  Laterality: N/A;  AICD Laser Lead Removal  & Replacement of DWVH-Tccsnswjx-Iqdans Broussard    LEFT HEART CATHETERIZATION         Time Tracking:     PT Received On: 09/14/22  PT Start Time: 1446     PT Stop Time: 1500  PT Total Time (min): 14 min     Billable Minutes: Evaluation 14 mins      09/14/2022

## 2022-09-14 NOTE — PROGRESS NOTES
"Jacqueline Miller is a 67 y.o. female patient.   1. AICD problem    2. AICD lead malfunction    3. H/O ventricular tachycardia      Past Medical History:   Diagnosis Date    SRUTHI positive     CAD (coronary artery disease)     CKD (chronic kidney disease)     Degenerative joint disease     Fibromyalgia     Glomus tympanicum tumor     Heart attack     Hepatitis C     Hypertension     ICD (implantable cardioverter-defibrillator) in place     Left sided sciatica     Noncompliance with medication regimen     Nonischemic cardiomyopathy     Obesity     Seborrheic keratoses     Tinea corporis     Undifferentiated connective tissue disease     Ventricular fibrillation     Vitamin D deficiency      No past surgical history pertinent negatives on file.  Scheduled Meds:   carvediloL  12.5 mg Oral BID    mupirocin   Nasal BID    sacubitriL-valsartan  1 tablet Oral BID     Continuous Infusions:  PRN Meds:HYDROcodone-acetaminophen    Review of patient's allergies indicates:   Allergen Reactions    Ketorolac      Other reaction(s): Tongue finding  slurred speech    Penicillins Hives    Cholecalciferol (vitamin d3) Edema     Other reaction(s): Hand and joint of hands swelling    Flexeril [cyclobenzaprine]      There are no hospital problems to display for this patient.    Blood pressure 110/70, pulse 76, temperature 98.8 °F (37.1 °C), temperature source Oral, resp. rate 17, height 5' 11" (1.803 m), weight 123.9 kg (273 lb 2.4 oz), SpO2 97 %, not currently breastfeeding.    Subjective:    POD #2  Awake. Alert.  Anticipated discharge today, but patient slow to ambulate    Objective:   AFVSS  Heart: RRR  Lungs: respirations nonlabored, clear  Incision: c/d/i    Assesment/Plan:    S/p Removal of defibrillator, laser-assisted removal of right ventricular lead  S/p Placement of new right ventricular defibrillator lead and placement of new defibrillator    - consult PT  - consult case management - patient lives with her daughter  - hold " discharge            PONCHO Mabry  9/14/2022

## 2022-09-14 NOTE — PLAN OF CARE
Order for home health noted. PT recommends HH/rehab. Spoke to daughter per phone about discharge planning. Daughter states that she is unable to lift on patient due to back and rotator cuff. Daughter would like patient to go to rehab before returning home. List of providers given. Daughter would like [facility]. Spoke to patient. Patient is refusing rehab at this time. Nurse notified.

## 2022-09-14 NOTE — PLAN OF CARE
Problem: Physical Therapy  Goal: Physical Therapy Goal  Description: Goals to be met by: 10/14/2022     Patient will increase functional independence with mobility by performin. Sit to stand transfer with Stand-by Assistance  2. Gait  x 200 feet with Stand-by Assistance using Rolling Walker vs LRAD.     Outcome: Ongoing, Progressing

## 2022-09-15 VITALS
WEIGHT: 273.13 LBS | OXYGEN SATURATION: 97 % | BODY MASS INDEX: 38.24 KG/M2 | TEMPERATURE: 98 F | HEIGHT: 71 IN | SYSTOLIC BLOOD PRESSURE: 132 MMHG | HEART RATE: 76 BPM | DIASTOLIC BLOOD PRESSURE: 59 MMHG | RESPIRATION RATE: 18 BRPM

## 2022-09-15 PROCEDURE — 25000003 PHARM REV CODE 250: Performed by: PHYSICIAN ASSISTANT

## 2022-09-15 PROCEDURE — 97530 THERAPEUTIC ACTIVITIES: CPT

## 2022-09-15 PROCEDURE — 99024 PR POST-OP FOLLOW-UP VISIT: ICD-10-PCS | Mod: POP,,, | Performed by: PHYSICIAN ASSISTANT

## 2022-09-15 PROCEDURE — 97166 OT EVAL MOD COMPLEX 45 MIN: CPT

## 2022-09-15 PROCEDURE — 97116 GAIT TRAINING THERAPY: CPT

## 2022-09-15 PROCEDURE — 99024 POSTOP FOLLOW-UP VISIT: CPT | Mod: POP,,, | Performed by: PHYSICIAN ASSISTANT

## 2022-09-15 RX ORDER — HYDROCODONE BITARTRATE AND ACETAMINOPHEN 5; 325 MG/1; MG/1
1 TABLET ORAL EVERY 4 HOURS PRN
Qty: 30 TABLET | Refills: 0 | Status: SHIPPED | OUTPATIENT
Start: 2022-09-15 | End: 2022-12-11

## 2022-09-15 RX ADMIN — HYDROCODONE BITARTRATE AND ACETAMINOPHEN 1 TABLET: 5; 325 TABLET ORAL at 02:09

## 2022-09-15 RX ADMIN — CARVEDILOL 12.5 MG: 12.5 TABLET, FILM COATED ORAL at 09:09

## 2022-09-15 RX ADMIN — SACUBITRIL AND VALSARTAN 1 TABLET: 24; 26 TABLET, FILM COATED ORAL at 09:09

## 2022-09-15 NOTE — PT/OT/SLP EVAL
"Occupational Therapy   Evaluation    Name: Jacqueline Miller  MRN: [id number]  Admitting Diagnosis:  AICD lead malfunction  Recent Surgery: Procedure(s) (LRB):  INSERTION, PACEMAKER (N/A)  EXTRACTION, ELECTRODE LEAD (N/A) 3 Days Post-Op    Recommendations:     Discharge Recommendations: rehabilitation facility, home with home health (with family assist provided. TBD, pending progress.)  Discharge Equipment Recommendations: Rollator vs RW, bath bench, dressing device (TBD, pending progress.)  Barriers to discharge: Decreased caregiver support    Assessment:     Jacqueline Miller is a 67 y.o. female with a medical diagnosis of AICD lead malfunction.  She presents with "sharp pain" on L aspect of back with deep breathing and pain in B elbows per pt. Performance deficits affecting function: weakness, impaired endurance, impaired self care skills, impaired functional mobility, impaired balance, decreased safety awareness, pain.      Rehab Prognosis: Good; patient would benefit from acute skilled OT services to address these deficits and reach maximum level of function.       Plan:     Patient to be seen 5 x/week, 6 x/week to address the above listed problems via self-care/home management, therapeutic activities, therapeutic exercises  Plan of Care Expires: 09/29/22  Plan of Care Reviewed with: patient    Subjective     Chief Complaint: "Sharp pain" on L aspect of back with deep breathing and pain in B elbows per pt.  Patient/Family Comments/goals: Return home.    Occupational Profile:  Living Environment: Lives with daughter (who works) and grandchildren in [facility] with small thresholds to enter x2. Owns tub/shower.  Previous level of function: Independent with ADL/IADLs and driving.  DME: Pt owns a rollator and SC (utilized during long distance gait in community)  Assistance upon Discharge: Pt's daughter (however, pt reported that her daughter works). Pt also reported that her neighbor is available to provide assist when needed. " However, pt would benefit from placement post-d/c to decrease burden of care and increase pt's safety and functional independence.    Patients cultural, spiritual, Pentecostal conflicts given the current situation: no    Objective:     Communicated with: RN prior to session. Patient found sitting edge of bed with telemetry upon OT entry to room.    General Precautions: Standard, fall, pacemaker pxns  Braces: LUE Sling  Respiratory Status: Room air  Vitals:  BP: 131/83  ME: 94, 83  O2: 94-95%    Occupational Performance:    Functional Mobility/Transfers:  Patient completed Sit <> Stand Transfer (from EOB) with contact guard assistance  with  no assistive device   Patient completed Toilet Transfer using GB with moderate assistance to perform sit to stand from toilet 2/2 low surface  Functional Mobility: Pt ambulated <> bathroom with HHA and min A provided 2/2 decreased balance. Pt able to t/f from EOB to chair at bedside with no AD and with CGA provided.    Activities of Daily Living:  Toileting: Pt able to perform pericare with SBA provided while seated on toilet during task. Pt was not wearing LB clothing item during eval.    Cognitive/Visual Perceptual:  Cognitive/Psychosocial Skills:     -       Oriented to: Person, Place, Time, and Situation   -       Follows Commands/attention:Follows one-step commands and Follows two-step commands  -       Safety awareness/insight to disability: impaired     Physical Exam:  Sensation:    -       Intact  Upper Extremity Range of Motion:     -       Right Upper Extremity: WFL  -       Left Upper Extremity: WFL within pacemaker pxns.  Upper Extremity Strength:    -       Right Upper Extremity: WFL  -       Left Upper Extremity: WFL within pacemaker pxns.    Treatment & Education:  OT provided education on pacemaker pxns, use of toilet frame with B HR (using RUE only) post-d/c in order to increase pt's safety and independence with toilet t/f, and importance of utilizing call bell  and waiting for assistance to arrive in order to increase pt's safety and decrease risk of fall during hospital stay. Pt verbalized good understanding of OT education.    Patient left up in chair with call button in reach    GOALS:   Multidisciplinary Problems       Occupational Therapy Goals          Problem: Occupational Therapy    Goal Priority Disciplines Outcome Interventions   Occupational Therapy Goal     OT, PT/OT Ongoing, Progressing    Description: Goals to be met by: 9/29/22    Patient will increase functional independence with ADLs by performing:    UE Dressing with Stand-by Assistance.  LE Dressing with Stand-by Assistance.  Toileting from toilet with Stand-by Assistance for hygiene and clothing management.                          History:     Past Medical History:   Diagnosis Date    SRUTHI positive     CAD (coronary artery disease)     CKD (chronic kidney disease)     Degenerative joint disease     Fibromyalgia     Glomus tympanicum tumor     Heart attack     Hepatitis C     Hypertension     ICD (implantable cardioverter-defibrillator) in place     Left sided sciatica     Noncompliance with medication regimen     Nonischemic cardiomyopathy     Obesity     Seborrheic keratoses     Tinea corporis     Undifferentiated connective tissue disease     Ventricular fibrillation     Vitamin D deficiency          Past Surgical History:   Procedure Laterality Date    CARDIAC DEFIBRILLATOR PLACEMENT      DENTAL SURGERY      all teeth removed    DERMOID CYST  EXCISION      HYSTERECTOMY      INSERTION OF PACEMAKER N/A 9/12/2022    Procedure: INSERTION, PACEMAKER;  Surgeon: Annetta Kumar MD;  Location: [facility];  Service: Cardiology;  Laterality: N/A;  AICD Laser Lead Removal & Replacement of JEOO-Zezweuxaj-Rpvghd Broussard    LEFT HEART CATHETERIZATION         Time Tracking:     OT Date of Treatment: 9/15/22  OT Start Time: 0945  OT Stop Time: 1018  OT Total Time (min): 33 min    Billable Minutes:Evaluation Mod  complexity 33 mins    9/15/2022

## 2022-09-15 NOTE — PT/OT/SLP PROGRESS
"Physical Therapy         Treatment        Jacqueline Miller   MRN: [id number]     PT Received On: 09/15/22  PT Start Time: 1028     PT Stop Time: 1100    PT Total Time (min): 32 min       Billable Minutes:  Gait Training1 and Therapeutic Activity 1  Total Minutes: 32    Treatment Type: Treatment  PT/PTA: PT     PTA Visit Number: 1       General Precautions: Standard, pacemaker  Orthopedic Precautions: Orthopedic Precautions : N/A   Braces: Braces: UE Sling    Spiritual, Cultural Beliefs, Worship Practices, Values that Affect Care: no    Subjective:  Communicated with NSG prior to session.    Pain/Comfort  Pain Rating 1: 5/10  Location - Side 1: Left  Location 1: chest    Objective:  Patient found sitting UIC, with Patient found with: PureWick, telemetry    Functional Mobility:    Transfer Training:  Sit to stand:Minimal Assistance and Moderate Assistance with Rolling Walker   Initial stand required modA. Vcs provided for anterior weight shift & proper hand and feet placement. Pt able to progress to SBA by the final 2 trials. Rest breaks required 2/2 L "sharp" pain  X6 trials performed    Gait Training:   Pt ambulated 150 feet with use of rollator; step-through pattern; no LOB; standing rest breaks       Activity Tolerance:  Patient tolerated treatment well    Patient left up in chair with all lines intact and call button in reach.    Assessment:  Jacqueline Miller is a 67 y.o. female with a medical diagnosis of AICD lead malfunction.     Rehab potential is good.    Activity tolerance: Good    Discharge recommendations: Discharge Facility/Level of Care Needs: home health PT, rehabilitation facility     Equipment recommendations:       GOALS:   Multidisciplinary Problems       Physical Therapy Goals          Problem: Physical Therapy    Goal Priority Disciplines Outcome Goal Variances Interventions   Physical Therapy Goal     PT, PT/OT Ongoing, Progressing     Description: Goals to be met by: 10/14/2022     Patient will " increase functional independence with mobility by performin. Sit to stand transfer with Stand-by Assistance  2. Gait  x 200 feet with Stand-by Assistance using Rolling Walker vs LRAD.                          PLAN:    Patient to be seen 6 x/week  to address the above listed problems via gait training, therapeutic activities, therapeutic exercises  Plan of Care expires: 10/14/22  Plan of Care reviewed with: patient         9/15/2022

## 2022-09-15 NOTE — PLAN OF CARE
Spoke to patient at bedside. Patient declines rehab. Spoke to daughter per phone. Daughter is in agreement with patient returning home with home health. List of home health providers given. FOC obtained for [facility]. Referral sent via [facility].

## 2022-09-15 NOTE — PLAN OF CARE
Problem: Occupational Therapy  Goal: Occupational Therapy Goal  Description: Goals to be met by: 9/29/22    Patient will increase functional independence with ADLs by performing:    UE Dressing with Stand-by Assistance.  LE Dressing with Stand-by Assistance.  Toileting from toilet with Stand-by Assistance for hygiene and clothing management.     Outcome: Ongoing, Progressing

## 2022-09-15 NOTE — DISCHARGE SUMMARY
[facility] 6th Floor Medical Telemetry  Cardiothoracic Surgery  Discharge Summary      Patient Name: Jacqueline Miller  MRN: [id number]  Admission Date: 9/12/2022  Hospital Length of Stay: 3 days  Discharge Date and Time:  09/15/2022 10:35 AM  Attending Physician: Annetta Kumar MD   Discharging Provider: PONCHO Valentin  Primary Care Provider: Primary Doctor No    HPI:   No notes on file    Procedure(s) (LRB):  INSERTION, PACEMAKER (N/A)  EXTRACTION, ELECTRODE LEAD (N/A)      Indwelling Lines/Drains at time of discharge:   Lines/Drains/Airways     None               Hospital Course: No notes on file    Goals of Care Treatment Preferences:  Code Status: Full Code      Consults (From admission, onward)        Status Ordering Provider     Inpatient consult to Social Work/Case Management  Once        Provider:  (Not yet assigned)    Acknowledged CADY DAWKINS          Significant Diagnostic Studies: Labs: All labs within the past 24 hours have been reviewed    Pending Diagnostic Studies:     None          No new Assessment & Plan notes have been filed under this hospital service since the last note was generated.  Service: Cardiothoracic Surgery    Final Active Diagnoses:    Diagnosis Date Noted POA    PRINCIPAL PROBLEM:  AICD lead malfunction [T82.110A] 06/01/2022 Yes     Chronic      Problems Resolved During this Admission:      Discharged Condition: good    Disposition: Home or Self Care    Follow Up:   Follow-up Information     Annetta Kumar MD Follow up on 10/5/2022.    Specialties: Cardiothoracic Surgery, Cardiology  Why: @ 8am  Contact information:  [address] [place] [id number] [phone]                       Patient Instructions:   No discharge procedures on file.  Medications:  Reconciled Home Medications:      Medication List      START taking these medications    HYDROcodone-acetaminophen 5-325 mg per tablet  Commonly known as: NORCO  Take 1 tablet by mouth every  4 (four) hours as needed for Pain.        CONTINUE taking these medications    acetaminophen 500 MG tablet  Commonly known as: TYLENOL  Take 500 mg by mouth every 6 (six) hours as needed for Pain.     aspirin 81 MG EC tablet  Commonly known as: ECOTRIN  Take 1 tablet by mouth once daily     carvediloL 12.5 MG tablet  Commonly known as: COREG  Take 1 tablet by mouth twice daily     dorzolamide-timolol 2-0.5% 22.3-6.8 mg/mL ophthalmic solution  Commonly known as: COSOPT  Place 1 drop into both eyes 2 (two) times daily.     ENTRESTO 24-26 mg per tablet  Generic drug: sacubitriL-valsartan  Take 1 tablet by mouth 2 (two) times daily.     nitroGLYCERIN 0.4 MG SL tablet  Commonly known as: NITROSTAT  Place 1 tablet (0.4 mg total) under the tongue every 5 (five) minutes as needed for Chest pain.          Time spent on the discharge of patient: 15 minutes    PONCHO Valentin  Cardiothoracic Surgery  [facility] - 6th Floor Medical Telemetry

## 2022-09-16 PROCEDURE — G0180 MD CERTIFICATION HHA PATIENT: HCPCS | Mod: ,,, | Performed by: THORACIC SURGERY (CARDIOTHORACIC VASCULAR SURGERY)

## 2022-09-16 PROCEDURE — G0180 PR HOME HEALTH MD CERTIFICATION: ICD-10-PCS | Mod: ,,, | Performed by: THORACIC SURGERY (CARDIOTHORACIC VASCULAR SURGERY)

## 2022-09-23 ENCOUNTER — OFFICE VISIT (OUTPATIENT)
Dept: FAMILY MEDICINE | Facility: CLINIC | Age: 67
End: 2022-09-23
Payer: MEDICARE

## 2022-09-23 VITALS
SYSTOLIC BLOOD PRESSURE: 110 MMHG | OXYGEN SATURATION: 100 % | HEART RATE: 75 BPM | BODY MASS INDEX: 39.62 KG/M2 | RESPIRATION RATE: 18 BRPM | HEIGHT: 71 IN | DIASTOLIC BLOOD PRESSURE: 62 MMHG | WEIGHT: 283 LBS | TEMPERATURE: 99 F

## 2022-09-23 DIAGNOSIS — Z13.820 ENCOUNTER FOR IMAGING TO ASSESS OSTEOPOROSIS: Primary | ICD-10-CM

## 2022-09-23 DIAGNOSIS — L03.011 PARONYCHIA OF FINGER OF RIGHT HAND: ICD-10-CM

## 2022-09-23 DIAGNOSIS — B35.3 TINEA PEDIS OF BOTH FEET: ICD-10-CM

## 2022-09-23 DIAGNOSIS — Z00.00 WELLNESS EXAMINATION: ICD-10-CM

## 2022-09-23 DIAGNOSIS — H61.21 IMPACTED CERUMEN OF RIGHT EAR: ICD-10-CM

## 2022-09-23 PROCEDURE — 99397 PER PM REEVAL EST PAT 65+ YR: CPT | Mod: GZ,S$PBB,, | Performed by: NURSE PRACTITIONER

## 2022-09-23 PROCEDURE — 99397 PR PREVENTIVE VISIT,EST,65 & OVER: ICD-10-PCS | Mod: GZ,S$PBB,, | Performed by: NURSE PRACTITIONER

## 2022-09-23 PROCEDURE — 90677 PCV20 VACCINE IM: CPT | Mod: PBBFAC

## 2022-09-23 PROCEDURE — 99215 OFFICE O/P EST HI 40 MIN: CPT | Mod: PBBFAC,25 | Performed by: NURSE PRACTITIONER

## 2022-09-23 PROCEDURE — G0009 ADMIN PNEUMOCOCCAL VACCINE: HCPCS | Mod: PBBFAC

## 2022-09-23 RX ORDER — MUPIROCIN 20 MG/G
OINTMENT TOPICAL
Qty: 22 G | Refills: 1 | Status: SHIPPED | OUTPATIENT
Start: 2022-09-23 | End: 2022-12-11

## 2022-09-23 RX ORDER — MICONAZOLE NITRATE 2 %
POWDER (GRAM) TOPICAL 2 TIMES DAILY
Qty: 71 G | Refills: 0 | Status: SHIPPED | OUTPATIENT
Start: 2022-09-23 | End: 2023-09-27

## 2022-09-23 RX ADMIN — PNEUMOCOCCAL 20-VALENT CONJUGATE VACCINE 0.5 ML
2.2; 2.2; 2.2; 2.2; 2.2; 2.2; 2.2; 2.2; 2.2; 2.2; 2.2; 2.2; 2.2; 2.2; 2.2; 2.2; 4.4; 2.2; 2.2; 2.2 INJECTION, SUSPENSION INTRAMUSCULAR at 09:09

## 2022-09-23 NOTE — ASSESSMENT & PLAN NOTE
Wellness examination completed.  Exercise as tolerated up to 30 minutes a day, 5 days a week.  Refrain from fried foods.  Follow a low-cholesterol, low-fat, low-salt diet.  Stay hydrated with water.  Follow-up in 6 months.  Return to clinic as needed.

## 2022-09-23 NOTE — PROGRESS NOTES
Patient Name: Jacqueline Miller   : 1955  MRN: [id number]     SUBJECTIVE:  Jacqueline Miller is a 67 y.o. female here for Annual Exam  .     66 Years old female presents to the clinic for a wellness exam. past medical history of HTN, AICD implementation s/p VT/VF arrest in , irregular heartbeat, CAD followed by  cardiologist Dr. 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 Henderson, and vitamin D deficiency.    Social Determinants of Health  Tobacco Use: Medium Risk      Smoking Tobacco Use: Former      Smokeless Tobacco Use: Never      Passive Exposure: Not on file  Alcohol Use: Not At Risk      Frequency of Alcohol Consumption: Never      Average Number of Drinks: Patient does not drink      Frequency of Binge Drinking: Never  Financial Resource Strain: Low Risk       Difficulty of Paying Living Expenses: Not hard at all  Food Insecurity: No Food Insecurity      Worried About Running Out of Food in the Last Year: Never true      Ran Out of Food in the Last Year: Never true  Transportation Needs: No Transportation Needs      Lack of Transportation (Medical): No      Lack of Transportation (Non-Medical): No  Physical Activity: Inactive      Days of Exercise per Week: 0 days      Minutes of Exercise per Session: 0 min  Stress: No Stress Concern Present      Feeling of Stress : Only a little  Social Connections: Moderately Isolated      Frequency of Communication with Friends and Family: Twice a week      Frequency of Social Gatherings with Friends and Family: Twice a week      Attends Taoism Services: 1 to 4 times per year      Active Member of Clubs or Organizations: No      Attends Club or Organization Meetings: Never      Marital Status:   Housing Stability: Low Risk       Unable to  "Pay for Housing in the Last Year: No      Number of Places Lived in the Last Year: 1      Unstable Housing in the Last Year: No  Depression PHQ-4: Low Risk       Last PHQ Score: 0   Vision exam: UD 20/30, OD 20/30, OS 20/30 with corrective lenses.  Dental Exam: pt. To schedule.  Gun Safety: no guns at home.  Water: drink 6-8 cups of water.  Car safety:  Wear seatbelt.  Employment:  Unemployed.  Exercise as tolerated up to 30 minutes a day, 5 days a week, follow low fat, low-cholesterol, low-salt diet.  Eat fresh fruits and vegetables.  Colorectal cancer screening referral has been initiated July 2022, awaiting approval and clearance from Dr. Raymond, cardiology.  Patient declined tetanus vaccine as well DEXA scan at this time.  Discuss on her next visit.        ALLERGIES:   Review of patient's allergies indicates:   Allergen Reactions    Ketorolac      Other reaction(s): Tongue finding  slurred speech    Penicillins Hives    Cholecalciferol (vitamin d3) Edema     Other reaction(s): Hand and joint of hands swelling    Flexeril [cyclobenzaprine]          ROS:  Review of Systems   All other systems reviewed and are negative.      OBJECTIVE:  Vital signs  Vitals:    09/23/22 0843   BP: 110/62   Pulse: 75   Resp: 18   Temp: 98.6 °F (37 °C)   TempSrc: Oral   SpO2: 100%   Weight: 128.4 kg (283 lb)   Height: 5' 11" (1.803 m)      Body mass index is 39.47 kg/m².    PHYSICAL EXAM:   Physical Exam  Vitals and nursing note reviewed.   Constitutional:       General: She is not in acute distress.     Appearance: Normal appearance. She is obese. She is not ill-appearing, toxic-appearing or diaphoretic.   HENT:      Head: Normocephalic and atraumatic.      Right Ear: External ear normal. There is impacted cerumen.      Left Ear: Tympanic membrane, ear canal and external ear normal. There is no impacted cerumen.      Nose: Nose normal. No congestion or rhinorrhea.      Mouth/Throat:      Mouth: Mucous membranes are moist.      " Pharynx: No oropharyngeal exudate or posterior oropharyngeal erythema.   Eyes:      Extraocular Movements: Extraocular movements intact.      Conjunctiva/sclera: Conjunctivae normal.      Pupils: Pupils are equal, round, and reactive to light.   Neck:      Thyroid: No thyroid mass, thyromegaly or thyroid tenderness.      Vascular: No carotid bruit.      Trachea: Trachea normal.   Cardiovascular:      Rate and Rhythm: Normal rate. Rhythm irregular.      Pulses: Normal pulses.      Heart sounds: Normal heart sounds.   Pulmonary:      Effort: Pulmonary effort is normal.      Breath sounds: Normal breath sounds. No wheezing or rhonchi.   Abdominal:      General: Bowel sounds are normal.      Palpations: Abdomen is soft.      Tenderness: There is no abdominal tenderness. There is no right CVA tenderness, left CVA tenderness, guarding or rebound.   Musculoskeletal:         General: No tenderness. Normal range of motion.      Cervical back: Normal range of motion and neck supple. No rigidity or tenderness.      Right lower leg: No edema.      Left lower leg: No edema.   Lymphadenopathy:      Cervical: No cervical adenopathy.   Skin:     General: Skin is warm.      Capillary Refill: Capillary refill takes less than 2 seconds.      Findings: Erythema (Right middle finger paronychia.  To touch, no drainage.) present.      Comments: Bilateral feet maceration noted between toe webs. (Athletes feet).   Neurological:      General: No focal deficit present.      Mental Status: She is alert and oriented to person, place, and time. Mental status is at baseline.      Cranial Nerves: No cranial nerve deficit.      Motor: No weakness.      Coordination: Coordination normal.      Gait: Gait normal.   Psychiatric:         Mood and Affect: Mood normal.         Behavior: Behavior normal.         Thought Content: Thought content normal.         Judgment: Judgment normal.        ASSESSMENT/PLAN:  1. Wellness examination  Assessment &  Plan:  Wellness examination completed.  Exercise as tolerated up to 30 minutes a day, 5 days a week.  Refrain from fried foods.  Follow a low-cholesterol, low-fat, low-salt diet.  Stay hydrated with water.  Follow-up in 6 months.  Return to clinic as needed.    Orders:  -     mupirocin (BACTROBAN) 2 % ointment  -     carbamide peroxide (DEBROX) 6.5 % otic solution  -     miconazole NITRATE 2 % (ZEASORB AF) 2 % top powder  -     pneumoc 20-harshil conj-dip cr(PF) (PREVNAR-20 (PF)) injection Syrg 0.5 mL    2. Paronychia of finger of right hand  Assessment & Plan:  Rx Bactroban topical ointment, apply thin layer to affected area 3 times a day for 5 days.  Keep clean, cover if needed.  Return to clinic as needed.    Orders:  -     mupirocin (BACTROBAN) 2 % ointment    3. Impacted cerumen of right ear  Assessment & Plan:  Rx carbamide peroxide, apply 4-5 drops to right ear twice daily for 4 days.  If no improvement return to clinic.    Orders:  -     carbamide peroxide (DEBROX) 6.5 % otic solution    4. Tinea pedis of both feet  Assessment & Plan:  Keep area clean and dry.  Rx Apply Zeasorb topical powder to affected area and specially between toe webs.  Apply powder twice a day for 14 days and then as needed.  Stay hydrated with fluids specially water, return to clinic as needed.      Orders:  -     miconazole NITRATE 2 % (ZEASORB AF) 2 % top powder         RESULTS:  Recent Results (from the past 1008 hour(s))   Comprehensive metabolic panel    Collection Time: 09/08/22 11:30 AM   Result Value Ref Range    Sodium Level 142 136 - 145 mmol/L    Potassium Level 4.9 3.5 - 5.1 mmol/L    Chloride 109 (H) 98 - 107 mmol/L    Carbon Dioxide 26 23 - 31 mmol/L    Glucose Level 93 82 - 115 mg/dL    Blood Urea Nitrogen 17.4 9.8 - 20.1 mg/dL    Creatinine 1.06 (H) 0.55 - 1.02 mg/dL    Calcium Level Total 9.1 8.4 - 10.2 mg/dL    Protein Total 6.6 5.8 - 7.6 gm/dL    Albumin Level 3.8 3.4 - 4.8 gm/dL    Globulin 2.8 2.4 - 3.5 gm/dL     Albumin/Globulin Ratio 1.4 1.1 - 2.0 ratio    Bilirubin Total 0.7 <=1.5 mg/dL    Alkaline Phosphatase 98 40 - 150 unit/L    Alanine Aminotransferase 8 0 - 55 unit/L    Aspartate Aminotransferase 11 5 - 34 unit/L    eGFR 58 mls/min/1.73/m2   Type & Screen    Collection Time: 09/08/22 11:30 AM   Result Value Ref Range    Group & Rh B POS     Indirect Kassandra GEL NEG    APTT    Collection Time: 09/08/22 11:30 AM   Result Value Ref Range    PTT 27.7 23.2 - 33.7 seconds   Urinalysis    Collection Time: 09/08/22 11:30 AM   Result Value Ref Range    Color, UA Yellow Yellow, Colorless, Other, Clear    Appearance, UA Clear Clear    Specific Gravity, UA 1.019 1.001 - 1.030    pH, UA 6.0 5.0, 5.5, 6.0, 6.5, 7.0, 7.5, 8.0, 8.5    Protein, UA Negative Negative, 300  mg/dL    Glucose, UA Negative Negative, Normal mg/dL    Ketones, UA Negative Negative, +1, +2, +3, +4, +5, >=160, >=80 mg/dL    Blood, UA Negative Negative unit/L    Bilirubin, UA Negative Negative mg/dL    Urobilinogen, UA 0.2 0.2, 1.0, Normal mg/dL    Nitrites, UA Negative Negative    Leukocyte Esterase, UA Trace (A) Negative, 75  unit/L   CBC with Differential    Collection Time: 09/08/22 11:30 AM   Result Value Ref Range    WBC 4.8 4.5 - 11.5 x10(3)/mcL    RBC 3.80 (L) 4.20 - 5.40 x10(6)/mcL    Hgb 10.8 (L) 12.0 - 16.0 gm/dL    Hct 34.4 (L) 37.0 - 47.0 %    MCV 90.5 80.0 - 94.0 fL    MCH 28.4 27.0 - 31.0 pg    MCHC 31.4 (L) 33.0 - 36.0 mg/dL    RDW 13.1 11.5 - 17.0 %    Platelet 169 130 - 400 x10(3)/mcL    MPV 11.1 (H) 7.4 - 10.4 fL    Neut % 53.6 %    Lymph % 32.5 %    Mono % 7.5 %    Eos % 5.0 %    Basophil % 1.2 %    Lymph # 1.57 0.6 - 4.6 x10(3)/mcL    Neut # 2.6 2.1 - 9.2 x10(3)/mcL    Mono # 0.36 0.1 - 1.3 x10(3)/mcL    Eos # 0.24 0 - 0.9 x10(3)/mcL    Baso # 0.06 0 - 0.2 x10(3)/mcL    IG# 0.01 0 - 0.04 x10(3)/mcL    IG% 0.2 %    NRBC% 0.0 %   Urinalysis, Microscopic    Collection Time: 09/08/22 11:30 AM   Result Value Ref Range    RBC, UA <5 <=5 /HPF     "WBC, UA <5 <=5 /HPF    Squamous Epithelial Cells, UA <5 <=5 /HPF    Bacteria, UA None Seen None Seen, Rare, Occasional /HPF   ABORH RETYPE    Collection Time: 09/08/22 11:55 AM   Result Value Ref Range    ABORH Retype B POS    Type & Screen    Collection Time: 09/12/22  7:36 AM   Result Value Ref Range    Group & Rh B POS     Indirect Kassandra GEL NEG    Specimen to Pathology    Collection Time: 09/12/22 12:45 PM   Result Value Ref Range    Case Report       Surgical Pathology                                Case: [id number]                                 Authorizing Provider:  Annetta Kumar MD        Collected:           09/12/2022 12:45 PM          Ordering Location:     [facility]  Received:            09/12/2022 04:59 PM                                 - Periop Services                                                            Pathologist:           Lukasz Cowan MD                                                             Specimen:    Heart, Defibrillator leads for ID-And defibrillator battery                                Final Diagnosis       DEFIBRILLATOR LEADS AND BATTERY FOR ID:    GROSS EVALUATION ONLY (AS BELOW).    PHOTOGRAPH  CODE 1       Clinical Information       Procedure:  INSERTION, PACEMAKER  EXTRACTION, ELECTRODE LEAD  Pre-op Diagnosis:  AICD problem [T82.9XXA, Z95.810]  AICD lead malfunction [T82.110A]  H/O ventricular tachycardia [Z86.79]  Post-op Diagnosis:  T82.9XXA, Z95.810 - AICD problem [ICD-10-CM]  T82.110A - AICD lead malfunction [ICD-10-CM]  Z86.79 - H/O ventricular tachycardia [ICD-10-CM]          Microscopic Description       A microscopic examination was performed and the diagnosis reflects the findings.            Gross Description       1. Heart, Defibrillator leads for ID-And defibrillator battery:   [id number], Jacqueline Miller, heart.    Labeled "Jacqueline Miller, [id number]".  Received fresh is an explanted defibrillator leads and battery. The battery is labeled "[employer] " "Eyad TOMLINSON VR, serial number [id number]" and measures 6.5 x 4.5 x 1.5 cm. Also received are multiple plastic and metallic leads measuring approximately 160 cm. No section. Photo taken.    QUYEN/bal            Report Footnotes       Unless the case is a "gross only" or additional testing only, the final diagnosis for each specimen is based on a microscopic examination of appropriate tissue sections.           Follow Up:  Follow up in about 6 months (around 3/23/2023).      Previous medical history/lab work/radiology reviewed and considered during medical management decisions.   Medication list reviewed and medication reconciliation performed.  Patient was provided  and care about his/her current diagnosis (es) and medications including risk/benefit and side effects/adverse events, over the counter medication uses/doses, home self-care and contact precautions,  and red flags and indications for when to seek immediate medical attention.   Patient was advised to continue compliance with current medication list and medical recommendations.  Patient dvised continued compliance with recommended eating habits/ diets for medical conditions and exercise 150 minutes/ week (if possible) for medical condition (s).  Educational handouts and instructions on selected disease management in AVS (After Visit Summary).    All of the patient's questions were answered to patient's satisfaction.   The patient was receptive, expressed verbal understanding and agreement the above plan.         This note was created with the assistance of a voice recognition software or phone dictation. There may be transcription errors as a result of using this technology however minimal. Effort has been made to assure accuracy of transcription but any obvious errors or omissions should be clarified with the author of the document      "

## 2022-09-26 NOTE — ASSESSMENT & PLAN NOTE
Rx Bactroban topical ointment, apply thin layer to affected area 3 times a day for 5 days.  Keep clean, cover if needed.  Return to clinic as needed.

## 2022-09-26 NOTE — ASSESSMENT & PLAN NOTE
Rx carbamide peroxide, apply 4-5 drops to right ear twice daily for 4 days.  If no improvement return to clinic.

## 2022-09-26 NOTE — ASSESSMENT & PLAN NOTE
Keep area clean and dry.  Rx Apply Zeasorb topical powder to affected area and specially between toe webs.  Apply powder twice a day for 14 days and then as needed.  Stay hydrated with fluids specially water, return to clinic as needed.

## 2022-09-29 ENCOUNTER — EXTERNAL HOME HEALTH (OUTPATIENT)
Dept: HOME HEALTH SERVICES | Facility: HOSPITAL | Age: 67
End: 2022-09-29
Payer: MEDICARE

## 2022-09-29 ENCOUNTER — DOCUMENT SCAN (OUTPATIENT)
Dept: HOME HEALTH SERVICES | Facility: HOSPITAL | Age: 67
End: 2022-09-29
Payer: MEDICARE

## 2022-09-29 ENCOUNTER — TELEPHONE (OUTPATIENT)
Dept: FAMILY MEDICINE | Facility: CLINIC | Age: 67
End: 2022-09-29
Payer: MEDICARE

## 2022-09-29 NOTE — TELEPHONE ENCOUNTER
Spoke to patient regarding this information. Patient was advised to go to the Urgent Care or ED to be evaluated. Patient states she was not able to go to either. Patient was strongly urged to visit [facility] or ED to be evaluated.

## 2022-09-29 NOTE — TELEPHONE ENCOUNTER
----- Message from Yvette Obdulio sent at 9/29/2022  2:04 PM CDT -----  Regarding: Question  Patient called because she received th pnuemonia vaccine on her last visit and it began to itch.  So she scratched/rubbed it and now has a red spot on her arm.  Is this a normal reaction?  Can someone from the office give her a return call.  Please and Thank you.  Patient contact is [phone].

## 2022-10-05 ENCOUNTER — OFFICE VISIT (OUTPATIENT)
Dept: CARDIAC SURGERY | Facility: CLINIC | Age: 67
End: 2022-10-05
Payer: MEDICARE

## 2022-10-05 VITALS
DIASTOLIC BLOOD PRESSURE: 108 MMHG | HEIGHT: 72 IN | BODY MASS INDEX: 38.33 KG/M2 | SYSTOLIC BLOOD PRESSURE: 185 MMHG | WEIGHT: 283 LBS | HEART RATE: 86 BPM

## 2022-10-05 DIAGNOSIS — T82.110A AICD LEAD MALFUNCTION: Primary | ICD-10-CM

## 2022-10-05 PROCEDURE — 99024 POSTOP FOLLOW-UP VISIT: CPT | Mod: ,,, | Performed by: THORACIC SURGERY (CARDIOTHORACIC VASCULAR SURGERY)

## 2022-10-05 PROCEDURE — 99024 PR POST-OP FOLLOW-UP VISIT: ICD-10-PCS | Mod: ,,, | Performed by: THORACIC SURGERY (CARDIOTHORACIC VASCULAR SURGERY)

## 2022-11-08 ENCOUNTER — HOSPITAL ENCOUNTER (EMERGENCY)
Facility: HOSPITAL | Age: 67
Discharge: LEFT BEFORE DISCHARGE | End: 2022-11-09
Payer: MEDICARE

## 2022-11-08 VITALS
HEIGHT: 72 IN | WEIGHT: 283 LBS | HEART RATE: 91 BPM | BODY MASS INDEX: 38.33 KG/M2 | OXYGEN SATURATION: 98 % | RESPIRATION RATE: 18 BRPM | DIASTOLIC BLOOD PRESSURE: 99 MMHG | SYSTOLIC BLOOD PRESSURE: 149 MMHG | TEMPERATURE: 97 F

## 2022-11-08 DIAGNOSIS — R53.1 WEAKNESS: ICD-10-CM

## 2022-11-08 LAB
ALBUMIN SERPL-MCNC: 3.9 GM/DL (ref 3.4–4.8)
ALBUMIN/GLOB SERPL: 1 RATIO (ref 1.1–2)
ALP SERPL-CCNC: 79 UNIT/L (ref 40–150)
ALT SERPL-CCNC: 6 UNIT/L (ref 0–55)
AST SERPL-CCNC: 9 UNIT/L (ref 5–34)
BASOPHILS # BLD AUTO: 0.04 X10(3)/MCL (ref 0–0.2)
BASOPHILS NFR BLD AUTO: 0.5 %
BILIRUBIN DIRECT+TOT PNL SERPL-MCNC: 0.7 MG/DL
BNP BLD-MCNC: 62.2 PG/ML
BUN SERPL-MCNC: 14.9 MG/DL (ref 9.8–20.1)
CALCIUM SERPL-MCNC: 9.5 MG/DL (ref 8.4–10.2)
CHLORIDE SERPL-SCNC: 104 MMOL/L (ref 98–107)
CO2 SERPL-SCNC: 25 MMOL/L (ref 23–31)
CREAT SERPL-MCNC: 1.77 MG/DL (ref 0.55–1.02)
EOSINOPHIL # BLD AUTO: 0.17 X10(3)/MCL (ref 0–0.9)
EOSINOPHIL NFR BLD AUTO: 2.1 %
ERYTHROCYTE [DISTWIDTH] IN BLOOD BY AUTOMATED COUNT: 12.7 % (ref 11.5–17)
GFR SERPLBLD CREATININE-BSD FMLA CKD-EPI: 32 MLS/MIN/1.73/M2
GLOBULIN SER-MCNC: 3.8 GM/DL (ref 2.4–3.5)
GLUCOSE SERPL-MCNC: 158 MG/DL (ref 82–115)
HCT VFR BLD AUTO: 34.6 % (ref 37–47)
HGB BLD-MCNC: 10.9 GM/DL (ref 12–16)
IMM GRANULOCYTES # BLD AUTO: 0.03 X10(3)/MCL (ref 0–0.04)
IMM GRANULOCYTES NFR BLD AUTO: 0.4 %
LYMPHOCYTES # BLD AUTO: 0.87 X10(3)/MCL (ref 0.6–4.6)
LYMPHOCYTES NFR BLD AUTO: 10.7 %
MCH RBC QN AUTO: 27.3 PG (ref 27–31)
MCHC RBC AUTO-ENTMCNC: 31.5 MG/DL (ref 33–36)
MCV RBC AUTO: 86.7 FL (ref 80–94)
MONOCYTES # BLD AUTO: 0.43 X10(3)/MCL (ref 0.1–1.3)
MONOCYTES NFR BLD AUTO: 5.3 %
NEUTROPHILS # BLD AUTO: 6.6 X10(3)/MCL (ref 2.1–9.2)
NEUTROPHILS NFR BLD AUTO: 81 %
NRBC BLD AUTO-RTO: 0 %
PLATELET # BLD AUTO: 204 X10(3)/MCL (ref 130–400)
PMV BLD AUTO: 10.7 FL (ref 7.4–10.4)
POTASSIUM SERPL-SCNC: 3.7 MMOL/L (ref 3.5–5.1)
PROT SERPL-MCNC: 7.7 GM/DL (ref 5.8–7.6)
RBC # BLD AUTO: 3.99 X10(6)/MCL (ref 4.2–5.4)
SODIUM SERPL-SCNC: 139 MMOL/L (ref 136–145)
TROPONIN I SERPL-MCNC: <0.01 NG/ML (ref 0–0.04)
WBC # SPEC AUTO: 8.1 X10(3)/MCL (ref 4.5–11.5)

## 2022-11-08 PROCEDURE — 83880 ASSAY OF NATRIURETIC PEPTIDE: CPT | Performed by: EMERGENCY MEDICINE

## 2022-11-08 PROCEDURE — 93010 ELECTROCARDIOGRAM REPORT: CPT | Mod: ,,, | Performed by: INTERNAL MEDICINE

## 2022-11-08 PROCEDURE — 93005 ELECTROCARDIOGRAM TRACING: CPT

## 2022-11-08 PROCEDURE — 84484 ASSAY OF TROPONIN QUANT: CPT | Performed by: EMERGENCY MEDICINE

## 2022-11-08 PROCEDURE — 80053 COMPREHEN METABOLIC PANEL: CPT | Performed by: EMERGENCY MEDICINE

## 2022-11-08 PROCEDURE — 99900041 HC LEFT WITHOUT BEING SEEN- EMERGENCY

## 2022-11-08 PROCEDURE — 93010 EKG 12-LEAD: ICD-10-PCS | Mod: ,,, | Performed by: INTERNAL MEDICINE

## 2022-11-08 PROCEDURE — 85025 COMPLETE CBC W/AUTO DIFF WBC: CPT | Performed by: EMERGENCY MEDICINE

## 2022-11-15 PROCEDURE — G0179 PR HOME HEALTH MD RECERTIFICATION: ICD-10-PCS | Mod: ,,, | Performed by: NURSE PRACTITIONER

## 2022-11-15 PROCEDURE — G0179 MD RECERTIFICATION HHA PT: HCPCS | Mod: ,,, | Performed by: NURSE PRACTITIONER

## 2022-12-08 ENCOUNTER — OFFICE VISIT (OUTPATIENT)
Dept: FAMILY MEDICINE | Facility: CLINIC | Age: 67
End: 2022-12-08
Payer: MEDICARE

## 2022-12-08 VITALS
DIASTOLIC BLOOD PRESSURE: 84 MMHG | HEART RATE: 45 BPM | WEIGHT: 258 LBS | OXYGEN SATURATION: 100 % | SYSTOLIC BLOOD PRESSURE: 132 MMHG | TEMPERATURE: 98 F | BODY MASS INDEX: 34.99 KG/M2

## 2022-12-08 DIAGNOSIS — G89.29 CHRONIC BILATERAL LOW BACK PAIN WITHOUT SCIATICA: Primary | ICD-10-CM

## 2022-12-08 DIAGNOSIS — M54.50 CHRONIC BILATERAL LOW BACK PAIN WITHOUT SCIATICA: Primary | ICD-10-CM

## 2022-12-08 DIAGNOSIS — I10 PRIMARY HYPERTENSION: ICD-10-CM

## 2022-12-08 PROCEDURE — 99213 OFFICE O/P EST LOW 20 MIN: CPT | Mod: S$PBB,,, | Performed by: NURSE PRACTITIONER

## 2022-12-08 PROCEDURE — 99214 OFFICE O/P EST MOD 30 MIN: CPT | Mod: PBBFAC | Performed by: NURSE PRACTITIONER

## 2022-12-08 PROCEDURE — 99213 PR OFFICE/OUTPT VISIT, EST, LEVL III, 20-29 MIN: ICD-10-PCS | Mod: S$PBB,,, | Performed by: NURSE PRACTITIONER

## 2022-12-08 RX ORDER — CARVEDILOL 25 MG/1
25 TABLET ORAL 2 TIMES DAILY
COMMUNITY
Start: 2022-10-05

## 2022-12-08 RX ORDER — TRIAMTERENE/HYDROCHLOROTHIAZID 37.5-25 MG
1 TABLET ORAL
COMMUNITY
Start: 2022-10-05

## 2022-12-08 NOTE — PROGRESS NOTES
"Patient Name: Jacqueline Miller   : 1955  MRN: [id number]     SUBJECTIVE DATA:    CHIEF COMPLAINT:   Jacqueline Miller is a 67 y.o. female who presents to clinic today with Follow-up        HPI:  66 Years old female presents to the clinic for a wellness exam. past medical history of HTN, AICD implementation s/p VT/VF arrest in , irregular heartbeat, CAD followed by  cardiologist Dr. 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 Henderson, and vitamin D deficiency.    Patient was seen and evaluated by me for wellness on 2022.    dysfunctional defibrillator lead and placement of defibrillator generator:  Patient to continue to repeat report some discomfort to her left chest wall surgical incision site. Patient had seen Dr. Annetta Kumar cardiovascular surgeon on 2022 and was told her wound has healed very well and was told to give it a little more time as it is still healing.  Discussed with patient the surgical incision usually deep with a had to cut tissue and muscle so they can insert that defibrillator.  The healing will take some time, possible nerve damage.  On exam surgical site healed, no swelling, no erythema, no discharge or bleeding.    Chronic lower back pain:  Patient continue to report lower back discomfort, the pain happen while she is sitting, sometimes it wakes her up from her sleep, worse on the left side. discussed brisk walking as tolerated, discussed referral to physical therapy, patient is hesitant "it is not going to help".  Discussed with patient at least we can use heat, cold therapy, massage, needling.  Patient state will give a try.  Referral to [facility] PHYSICAL THERAPY has been initiated.  Denies any incontinence or bowel habit changes.    Patient " denies chest pain, shortness of breath, dyspnea on exertion, palpitations, peripheral edema, abdominal pain, nausea, vomiting, diarrhea, constipation, fatigue, fever, chills, dysuria,  hematuria, melena, or hematochezia.    Declines colonoscopy or Cologuard testing.  Declines DEXA bone scan.  Declines immunizations.                 ALLERGIES:   Review of patient's allergies indicates:   Allergen Reactions    Ketorolac      Other reaction(s): Tongue finding  slurred speech    Penicillins Hives    Cholecalciferol (vitamin d3) Edema     Other reaction(s): Hand and joint of hands swelling    Flexeril [cyclobenzaprine]          ROS:  Review of Systems   Constitutional:  Negative for diaphoresis, fever, malaise/fatigue and weight loss.   HENT: Negative.     Eyes: Negative.    Respiratory: Negative.     Cardiovascular:  Positive for chest pain (Left chest wall bad defibrillator insertion site).   Gastrointestinal: Negative.    Genitourinary: Negative.    Musculoskeletal:  Positive for back pain (Bilateral chronic).   Skin:  Negative for itching and rash.   Neurological: Negative.    Endo/Heme/Allergies: Negative.    Psychiatric/Behavioral: Negative.     All other systems reviewed and are negative.      OBJECTIVE DATA:  Vital signs  Vitals:    12/08/22 1422   BP: 132/84   Pulse: (!) 45   Temp: 98.1 °F (36.7 °C)   SpO2: 100%   Weight: 117 kg (258 lb)      Body mass index is 34.99 kg/m².    PHYSICAL EXAM:   Physical Exam  Vitals and nursing note reviewed.   Constitutional:       General: She is not in acute distress.     Appearance: Normal appearance. She is not ill-appearing, toxic-appearing or diaphoretic.   HENT:      Head: Normocephalic and atraumatic.   Eyes:      Extraocular Movements: Extraocular movements intact.      Pupils: Pupils are equal, round, and reactive to light.   Cardiovascular:      Rate and Rhythm: Normal rate. Rhythm irregular.      Pulses: Normal pulses.      Heart sounds: Normal heart sounds.    Pulmonary:      Effort: Pulmonary effort is normal.      Breath sounds: Normal breath sounds. No wheezing or rhonchi.   Abdominal:      Palpations: Abdomen is soft.      Tenderness: There is no abdominal tenderness.   Musculoskeletal:         General: Tenderness (Bilateral lower back pain tenderness with palpation, no crepitus, no deformity, paraspinal lumbar muscle skeletal tenderness worse on the left than the right.) present. Normal range of motion.      Cervical back: Normal range of motion and neck supple. No rigidity.   Lymphadenopathy:      Cervical: No cervical adenopathy.   Skin:     General: Skin is warm.      Capillary Refill: Capillary refill takes less than 2 seconds.      Findings: No bruising or erythema.      Comments:  Left-sided upper chest wall surgical site healed, no swelling, no erythema, no discharge or bleeding   Neurological:      General: No focal deficit present.      Mental Status: She is alert and oriented to person, place, and time. Mental status is at baseline.   Psychiatric:         Mood and Affect: Mood normal.         Behavior: Behavior normal.         Thought Content: Thought content normal.         Judgment: Judgment normal.        ASSESSMENT/PLAN:  1. Chronic bilateral low back pain without sciatica  Assessment & Plan:  Referral to physical therapy has been initiated, take Tylenol as directed on the label for pain as needed, stay hydrated with fluids specially water.    Orders:  -     Ambulatory referral/consult to Physical/Occupational Therapy; Future; Expected date: 12/18/2022    2. Primary hypertension  Overview:  The patient presents with essential hypertension.  The patient is tolerating the medication well and is in excellent compliance.  The patient is experiencing no side effects.  Counseling was offered regarding low salt diets.  The patient has a reduced salt intake.  The patient denies chest pain, palpitations, shortness of breath, dyspnea on exertion, left or murmur  neck pain, nausea, vomiting, diaphoresis, paroxysmal nocturnal dyspnea, and orthopnea.       Hypertension Medications               carvediloL (COREG) 12.5 MG tablet Take 1 tablet by mouth twice daily    ENTRESTO 24-26 mg per tablet Take 1 tablet by mouth 2 (two) times daily.    nitroGLYCERIN (NITROSTAT) 0.4 MG SL tablet Place 1 tablet (0.4 mg total) under the tongue every 5 (five) minutes as needed for Chest pain.          Will contact  Dr. Raymond' office/ nurse know patient's bp elevated pulse 70. How would he like me to adjust her Bp.  Entresto can be increased or can start  amlodipine. Which  Antihypertensive does he prefer [facility] on 7/11/2022.  May also try spironolactone.        Assessment & Plan:  Continue medications as prescribed.  Follow-up with Dr. Oscar raymond as directed.  Blood pressure 132/84, controlled with current medication.  Continue to follow low salt, low cholesterol, low-fat diet.             RESULTS:  Recent Results (from the past 1008 hour(s))   Comprehensive metabolic panel    Collection Time: 11/08/22 10:39 PM   Result Value Ref Range    Sodium Level 139 136 - 145 mmol/L    Potassium Level 3.7 3.5 - 5.1 mmol/L    Chloride 104 98 - 107 mmol/L    Carbon Dioxide 25 23 - 31 mmol/L    Glucose Level 158 (H) 82 - 115 mg/dL    Blood Urea Nitrogen 14.9 9.8 - 20.1 mg/dL    Creatinine 1.77 (H) 0.55 - 1.02 mg/dL    Calcium Level Total 9.5 8.4 - 10.2 mg/dL    Protein Total 7.7 (H) 5.8 - 7.6 gm/dL    Albumin Level 3.9 3.4 - 4.8 gm/dL    Globulin 3.8 (H) 2.4 - 3.5 gm/dL    Albumin/Globulin Ratio 1.0 (L) 1.1 - 2.0 ratio    Bilirubin Total 0.7 <=1.5 mg/dL    Alkaline Phosphatase 79 40 - 150 unit/L    Alanine Aminotransferase 6 0 - 55 unit/L    Aspartate Aminotransferase 9 5 - 34 unit/L    eGFR 32 mls/min/1.73/m2   Brain natriuretic peptide    Collection Time: 11/08/22 10:39 PM   Result Value Ref Range    Natriuretic Peptide 62.2 <=100.0 pg/mL   Troponin I    Collection Time: 11/08/22 10:39 PM   Result Value  Ref Range    Troponin-I <0.010 0.000 - 0.045 ng/mL   CBC with Differential    Collection Time: 11/08/22 10:39 PM   Result Value Ref Range    WBC 8.1 4.5 - 11.5 x10(3)/mcL    RBC 3.99 (L) 4.20 - 5.40 x10(6)/mcL    Hgb 10.9 (L) 12.0 - 16.0 gm/dL    Hct 34.6 (L) 37.0 - 47.0 %    MCV 86.7 80.0 - 94.0 fL    MCH 27.3 27.0 - 31.0 pg    MCHC 31.5 (L) 33.0 - 36.0 mg/dL    RDW 12.7 11.5 - 17.0 %    Platelet 204 130 - 400 x10(3)/mcL    MPV 10.7 (H) 7.4 - 10.4 fL    Neut % 81.0 %    Lymph % 10.7 %    Mono % 5.3 %    Eos % 2.1 %    Basophil % 0.5 %    Lymph # 0.87 0.6 - 4.6 x10(3)/mcL    Neut # 6.6 2.1 - 9.2 x10(3)/mcL    Mono # 0.43 0.1 - 1.3 x10(3)/mcL    Eos # 0.17 0 - 0.9 x10(3)/mcL    Baso # 0.04 0 - 0.2 x10(3)/mcL    IG# 0.03 0 - 0.04 x10(3)/mcL    IG% 0.4 %    NRBC% 0.0 %         Follow Up:  Follow up in about 3 months (around 3/8/2023).      Previous medical history/lab work/radiology reviewed and considered during medical management decisions.   Medication list reviewed and medication reconciliation performed.  Patient was provided  and care about his/her current diagnosis (es) and medications including risk/benefit and side effects/adverse events, over the counter medication uses/doses, home self-care and contact precautions,  and red flags and indications for when to seek immediate medical attention.   Patient was advised to continue compliance with current medication list and medical recommendations.  Patient dvised continued compliance with recommended eating habits/ diets for medical conditions and exercise 150 minutes/ week (if possible) for medical condition (s).  Educational handouts and instructions on selected disease management in AVS (After Visit Summary).    All of the patient's questions were answered to patient's satisfaction.   The patient was receptive, expressed verbal understanding and agreement the above plan.       This note was created with the assistance of a voice recognition software or  phone dictation. There may be transcription errors as a result of using this technology however minimal. Effort has been made to assure accuracy of transcription but any obvious errors or omissions should be clarified with the author of the document

## 2022-12-11 PROBLEM — M54.50 CHRONIC BILATERAL LOW BACK PAIN WITHOUT SCIATICA: Status: ACTIVE | Noted: 2022-12-11

## 2022-12-11 PROBLEM — G89.29 CHRONIC BILATERAL LOW BACK PAIN WITHOUT SCIATICA: Status: ACTIVE | Noted: 2022-12-11

## 2022-12-11 NOTE — ASSESSMENT & PLAN NOTE
Referral to physical therapy has been initiated, take Tylenol as directed on the label for pain as needed, stay hydrated with fluids specially water.

## 2022-12-11 NOTE — ASSESSMENT & PLAN NOTE
Continue medications as prescribed.  Follow-up with Dr. Oscar brooks as directed.  Blood pressure 132/84, controlled with current medication.  Continue to follow low salt, low cholesterol, low-fat diet.

## 2022-12-26 PROBLEM — Z00.00 WELLNESS EXAMINATION: Status: RESOLVED | Noted: 2022-09-01 | Resolved: 2022-12-26

## 2023-03-02 ENCOUNTER — EXTERNAL HOME HEALTH (OUTPATIENT)
Dept: HOME HEALTH SERVICES | Facility: HOSPITAL | Age: 68
End: 2023-03-02
Payer: MEDICARE

## 2023-03-08 ENCOUNTER — OFFICE VISIT (OUTPATIENT)
Dept: FAMILY MEDICINE | Facility: CLINIC | Age: 68
End: 2023-03-08
Payer: MEDICARE

## 2023-03-08 ENCOUNTER — HOSPITAL ENCOUNTER (OUTPATIENT)
Dept: RADIOLOGY | Facility: HOSPITAL | Age: 68
Discharge: HOME | End: 2023-03-08
Attending: NURSE PRACTITIONER
Payer: MEDICARE

## 2023-03-08 VITALS
HEART RATE: 65 BPM | HEIGHT: 72 IN | TEMPERATURE: 98 F | OXYGEN SATURATION: 99 % | RESPIRATION RATE: 16 BRPM | WEIGHT: 250 LBS | DIASTOLIC BLOOD PRESSURE: 61 MMHG | SYSTOLIC BLOOD PRESSURE: 137 MMHG | BODY MASS INDEX: 33.86 KG/M2

## 2023-03-08 DIAGNOSIS — I42.9 CARDIOMYOPATHY, UNSPECIFIED TYPE: ICD-10-CM

## 2023-03-08 DIAGNOSIS — M35.9 UNDIFFERENTIATED CONNECTIVE TISSUE DISEASE: ICD-10-CM

## 2023-03-08 DIAGNOSIS — E66.01 MORBID (SEVERE) OBESITY DUE TO EXCESS CALORIES: ICD-10-CM

## 2023-03-08 DIAGNOSIS — I73.9 PERIPHERAL VASCULAR DISEASE, UNSPECIFIED: ICD-10-CM

## 2023-03-08 DIAGNOSIS — B18.2 CHRONIC HEPATITIS C WITHOUT HEPATIC COMA: ICD-10-CM

## 2023-03-08 DIAGNOSIS — Z12.12 ENCOUNTER FOR COLORECTAL CANCER SCREENING: ICD-10-CM

## 2023-03-08 DIAGNOSIS — R10.30 LOWER ABDOMINAL PAIN: ICD-10-CM

## 2023-03-08 DIAGNOSIS — Z12.31 ENCOUNTER FOR SCREENING MAMMOGRAM FOR BREAST CANCER: ICD-10-CM

## 2023-03-08 DIAGNOSIS — I25.118 ATHEROSCLEROTIC HEART DISEASE OF NATIVE CORONARY ARTERY WITH OTHER FORMS OF ANGINA PECTORIS: ICD-10-CM

## 2023-03-08 DIAGNOSIS — R21 RASH: ICD-10-CM

## 2023-03-08 DIAGNOSIS — M54.50 CHRONIC BILATERAL LOW BACK PAIN WITHOUT SCIATICA: ICD-10-CM

## 2023-03-08 DIAGNOSIS — R10.30 LOWER ABDOMINAL PAIN: Primary | ICD-10-CM

## 2023-03-08 DIAGNOSIS — N18.30 STAGE 3 CHRONIC KIDNEY DISEASE, UNSPECIFIED WHETHER STAGE 3A OR 3B CKD: ICD-10-CM

## 2023-03-08 DIAGNOSIS — D18.09 GLOMUS TUMOR OF MIDDLE EAR: ICD-10-CM

## 2023-03-08 DIAGNOSIS — K59.00 CONSTIPATION, UNSPECIFIED CONSTIPATION TYPE: Primary | ICD-10-CM

## 2023-03-08 DIAGNOSIS — G89.29 CHRONIC BILATERAL LOW BACK PAIN WITHOUT SCIATICA: ICD-10-CM

## 2023-03-08 DIAGNOSIS — Z12.11 ENCOUNTER FOR COLORECTAL CANCER SCREENING: ICD-10-CM

## 2023-03-08 PROCEDURE — 99215 OFFICE O/P EST HI 40 MIN: CPT | Mod: PBBFAC | Performed by: NURSE PRACTITIONER

## 2023-03-08 PROCEDURE — 99214 PR OFFICE/OUTPT VISIT, EST, LEVL IV, 30-39 MIN: ICD-10-PCS | Mod: S$PBB,,, | Performed by: NURSE PRACTITIONER

## 2023-03-08 PROCEDURE — 99214 OFFICE O/P EST MOD 30 MIN: CPT | Mod: S$PBB,,, | Performed by: NURSE PRACTITIONER

## 2023-03-08 PROCEDURE — 74019 RADEX ABDOMEN 2 VIEWS: CPT | Mod: TC

## 2023-03-08 RX ORDER — POLYETHYLENE GLYCOL 3350 17 G/17G
17 POWDER, FOR SOLUTION ORAL DAILY
Qty: 289 G | Refills: 1 | Status: SHIPPED | OUTPATIENT
Start: 2023-03-08

## 2023-03-08 NOTE — ASSESSMENT & PLAN NOTE
Discussed x-rays, patient agreed and verbalized.  Also patient agreed to be referred to Gastroenterology for colonoscopy.  Referral to [facility] initiated.

## 2023-03-08 NOTE — PROGRESS NOTES
Patient Name: Jacqueline Miller   : 1955  MRN: [id number]     SUBJECTIVE DATA:    CHIEF COMPLAINT:   Jacqueline Miller is a 67 y.o. female who presents to clinic today with Follow-up and Back Pain (Routine follow up visit for back pain. )    [facility] health documentation signed.    HPI: 66 Years old female presents to the clinic for a wellness exam. past medical history of HTN, AICD implementation s/p VT/VF arrest in , irregular heartbeat, CAD followed by  cardiologist Dr. 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 Henderson, and vitamin D deficiency.    Left lower abdominal pain:  Patient states she is been having left lower abdominal pain since she had her ICD insertion to her left chest wall by Dr. Kumar.  Patient denies any constipation or gas.  Denies any nausea or vomiting or diarrhea. Patient denies any rectal bleed or any blood in stool.  Discussed x-rays, patient agreed and verbalized.  Also patient agreed to be referred to Gastroenterology for colonoscopy.  Referral to [facility] initiated.    Chronic lower back and bilateral shoulder pain:  Patient continue to report lower back discomfort, the pain happen while she is sitting, sometimes it wakes her up from her sleep, worse on the left side.  Also report bilateral shoulder pain.  Patient was referred to [facility] physical therapy.  Patient states she had received a call from physical therapy but she was not able to talk to them because she was involved in prayers over the phone and she never called them back. Denies any incontinence or bowel habit changes.    Rash behind ears:  Patient states she have a spot behind her right ear that is irritating and has been there for quite some time.  Instructed patient this  is due to mask wearing.  She does have multiple skin tags.  There is no erythema or bleeding or any sign of infection.       Patient denies chest pain, shortness of breath, dyspnea on exertion, palpitations, peripheral edema, abdominal pain, nausea, vomiting, diarrhea, constipation, fatigue, fever, chills, dysuria,  hematuria, melena, or hematochezia.     Care gaps:  Patient agreed to be referred to colonoscopy.  Declines DEXA bone scan at this time as well immunizations.  HPI      ALLERGIES:   Review of patient's allergies indicates:   Allergen Reactions    Ketorolac      Other reaction(s): Tongue finding  slurred speech    Penicillins Hives    Cholecalciferol (vitamin d3) Edema     Other reaction(s): Hand and joint of hands swelling    Flexeril [cyclobenzaprine]          ROS:  Review of Systems   Gastrointestinal:  Positive for abdominal pain.   Musculoskeletal:  Positive for back pain.   Skin:  Positive for rash.   All other systems reviewed and are negative.      OBJECTIVE DATA:  Vital signs  Vitals:    03/08/23 0941 03/08/23 0947   BP: (!) 163/70 137/61   Pulse: 78 65   Resp: 16    Temp: 98.1 °F (36.7 °C)    SpO2: 99%    Weight: 113.4 kg (250 lb)    Height: 6' (1.829 m)       Body mass index is 33.91 kg/m².    PHYSICAL EXAM:   Physical Exam  Vitals and nursing note reviewed.   Constitutional:       General: She is awake. She is not in acute distress.     Appearance: Normal appearance. She is well-developed and well-groomed. She is obese. She is not ill-appearing, toxic-appearing or diaphoretic.   HENT:      Head: Normocephalic and atraumatic.      Right Ear: Tympanic membrane, ear canal and external ear normal.      Left Ear: Tympanic membrane, ear canal and external ear normal.      Nose: Nose normal.      Mouth/Throat:      Lips: Pink.      Mouth: Mucous membranes are moist.      Pharynx: Oropharynx is clear. Uvula midline.   Eyes:      General: Lids are normal. Gaze aligned appropriately.      Extraocular  Movements: Extraocular movements intact.      Conjunctiva/sclera: Conjunctivae normal.      Pupils: Pupils are equal, round, and reactive to light.   Neck:      Trachea: Trachea and phonation normal.   Cardiovascular:      Rate and Rhythm: Normal rate. Rhythm irregular.      Pulses: Normal pulses.      Heart sounds: Normal heart sounds.   Pulmonary:      Effort: Pulmonary effort is normal.      Breath sounds: Normal breath sounds and air entry. No wheezing or rhonchi.   Abdominal:      General: Bowel sounds are normal. There is no distension.      Palpations: Abdomen is soft.      Tenderness: There is abdominal tenderness. There is no right CVA tenderness, left CVA tenderness, guarding or rebound.   Musculoskeletal:         General: Tenderness (Chronic bilateral shoulder and left lower back.  Denies any fall or trauma .) present. Normal range of motion.      Cervical back: Full passive range of motion without pain, normal range of motion and neck supple. No rigidity or tenderness.      Right lower leg: No edema.      Left lower leg: No edema.   Lymphadenopathy:      Cervical: No cervical adenopathy.   Skin:     General: Skin is warm and dry.      Capillary Refill: Capillary refill takes less than 2 seconds.      Findings: Rash (Irritation noted behind the ear, is due to mask wearing, multiple skin tag noted, no erythema, no bleeding, no sign of infection.) present.   Neurological:      General: No focal deficit present.      Mental Status: She is alert and oriented to person, place, and time. Mental status is at baseline.      GCS: GCS eye subscore is 4. GCS verbal subscore is 5. GCS motor subscore is 6.      Cranial Nerves: Cranial nerves 2-12 are intact.      Sensory: Sensation is intact.      Motor: Motor function is intact.      Coordination: Coordination is intact.      Gait: Gait is intact. Gait normal.   Psychiatric:         Attention and Perception: Attention and perception normal.         Mood and Affect:  Mood normal.         Speech: Speech normal.         Behavior: Behavior normal. Behavior is cooperative.         Thought Content: Thought content normal.         Cognition and Memory: Cognition and memory normal.         Judgment: Judgment normal.        ASSESSMENT/PLAN:  1. Lower abdominal pain  Assessment & Plan:  Discussed x-rays, patient agreed and verbalized.  Also patient agreed to be referred to Gastroenterology for colonoscopy.  Referral to [facility] initiated.      Orders:  -     X-Ray Abdomen Flat And Erect; Future; Expected date: 03/08/2023    2. Rash  Assessment & Plan:  Instructed patient this is due to mask wearing.  She does have multiple skin tags.  There is no erythema or bleeding or any sign of infection.        3. Chronic bilateral low back pain without sciatica  Assessment & Plan:  Please contact [facility] physical therapy to schedule appointment.  OTC Tylenol arthritis as directed on the label.  Stay hydrated with water.      4. Encounter for screening mammogram for breast cancer  -     Mammo Digital Screening Bilat w/ Sam; Future; Expected date: 03/08/2023    5. Encounter for colorectal cancer screening  -     Ambulatory referral/consult to Gastroenterology; Future; Expected date: 03/15/2023    6. Peripheral vascular disease, unspecified    7. Glomus tumor of middle ear    8. Undifferentiated connective tissue disease    9. Morbid (severe) obesity due to excess calories    10. Atherosclerotic heart disease of native coronary artery with other forms of angina pectoris  -     Ambulatory referral/consult to Outpatient Case Management    11. Stage 3 chronic kidney disease, unspecified whether stage 3a or 3b CKD  -     Ambulatory referral/consult to Outpatient Case Management    12. Cardiomyopathy, unspecified type  -     Ambulatory referral/consult to Outpatient Case Management    13. Chronic hepatitis C without hepatic coma  Overview:  Resolved  Treated successfully  with  Epclusa             RESULTS:  No results found for this or any previous visit (from the past 1008 hour(s)).      Follow Up:  Follow up in about 6 months (around 9/8/2023).      Previous medical history/lab work/radiology reviewed and considered during medical management decisions.   Medication list reviewed and medication reconciliation performed.  Patient was provided  and care about his/her current diagnosis (es) and medications including risk/benefit and side effects/adverse events, over the counter medication uses/doses, home self-care and contact precautions,  and red flags and indications for when to seek immediate medical attention.   Patient was advised to continue compliance with current medication list and medical recommendations.  Patient dvised continued compliance with recommended eating habits/ diets for medical conditions and exercise 150 minutes/ week (if possible) for medical condition (s).  Educational handouts and instructions on selected disease management in AVS (After Visit Summary).    All of the patient's questions were answered to patient's satisfaction.   The patient was receptive, expressed verbal understanding and agreement the above plan.           This note was created with the assistance of a voice recognition software or phone dictation. There may be transcription errors as a result of using this technology however minimal. Effort has been made to assure accuracy of transcription but any obvious errors or omissions should be clarified with the author of the document

## 2023-03-08 NOTE — ASSESSMENT & PLAN NOTE
Instructed patient this is due to mask wearing.  She does have multiple skin tags.  There is no erythema or bleeding or any sign of infection.

## 2023-03-08 NOTE — PROGRESS NOTES
Please notify patient regarding her abdominal x-ray test results,  Some residual feces identified throughout the colon.  Treat for constipation.  Rx MiraLax p.o. once daily the 1st 3 days and then as needed.  Rx has been sent to her preferred pharmacy.

## 2023-03-08 NOTE — ASSESSMENT & PLAN NOTE
Please contact [facility] physical therapy to schedule appointment.  OTC Tylenol arthritis as directed on the label.  Stay hydrated with water.

## 2023-03-09 ENCOUNTER — OUTPATIENT CASE MANAGEMENT (OUTPATIENT)
Dept: ADMINISTRATIVE | Facility: OTHER | Age: 68
End: 2023-03-09
Payer: MEDICARE

## 2023-03-09 NOTE — LETTER
March 9, 2023    Jacqueline Miller   [address] [id number]  [address] [place] [id number]              Dear Ms. Miller,    I am writing from the Outpatient Care Management Department at [facility].  I received a referral from Dr. Traylor to contact you regarding any needs you may have.  I have been unable to reach you by phone.   Please contact me if you would like to discuss any needs.     I can be reached at [phone] Monday through Thursday from 7:30 AM to 4:30pm and on Fridays from 7:30 AM to 12:00 PM.     [facility] also has a program with a nurse available 24/7 to answer questions or provide medical advice.  [facility] can be reached at [phone].     Sincerely,     Kimberlyn Ryan RN   RN Outpatient Case Management

## 2023-03-10 ENCOUNTER — TELEPHONE (OUTPATIENT)
Dept: FAMILY MEDICINE | Facility: CLINIC | Age: 68
End: 2023-03-10
Payer: MEDICARE

## 2023-03-10 NOTE — TELEPHONE ENCOUNTER
----- Message from ASHISH Eaton sent at 3/8/2023  5:09 PM CST -----  Please notify patient regarding her abdominal x-ray test results,  Some residual feces identified throughout the colon.  Treat for constipation.  Rx MiraLax p.o. once daily the 1st 3 days and then as needed.  Rx has been sent to her preferred pharmacy.

## 2023-03-20 NOTE — PROGRESS NOTES
Outpatient Care Management  Initial Patient Assessment    Patient: Jacqueline Miller  MRN: [id number]  Date of Service: 03/09/2023  Completed by: Kimberlyn Ryan RN  Referral Date: 03/08/2023  Program: High Risk  Status: Identified  Start Date: 3/9/2023  Responsible Staff: Kimberlyn Ryan RN        Reason for Visit   Patient presents with    [facility] Chart Review     3/9/23    [facility] RN First Assessment Attempt     3/9/2023  1st attempt to complete Initial Assessment  for Outpatient Care Management, left message.  Will mail unable to assess letter.       [facility] Enrollment Call     3/17/2023       Brief Summary:    3/17/23- Spoke with pt re: [facility] program. Lengthy conversation regarding program and pt's perception of health and distrust of healthcare professionals. Pt agreed to follow up call next week to discuss participation.

## 2023-03-21 ENCOUNTER — HOSPITAL ENCOUNTER (OUTPATIENT)
Dept: RADIOLOGY | Facility: HOSPITAL | Age: 68
Discharge: HOME | End: 2023-03-21
Attending: NURSE PRACTITIONER
Payer: MEDICARE

## 2023-03-21 DIAGNOSIS — Z12.31 ENCOUNTER FOR SCREENING MAMMOGRAM FOR BREAST CANCER: ICD-10-CM

## 2023-03-21 PROCEDURE — 77067 SCR MAMMO BI INCL CAD: CPT | Mod: 26,,, | Performed by: RADIOLOGY

## 2023-03-21 PROCEDURE — 77067 MAMMO DIGITAL SCREENING BILAT WITH TOMO: ICD-10-PCS | Mod: 26,,, | Performed by: RADIOLOGY

## 2023-03-21 PROCEDURE — 77067 SCR MAMMO BI INCL CAD: CPT | Mod: TC

## 2023-03-21 PROCEDURE — 77063 MAMMO DIGITAL SCREENING BILAT WITH TOMO: ICD-10-PCS | Mod: 26,,, | Performed by: RADIOLOGY

## 2023-03-21 PROCEDURE — 77063 BREAST TOMOSYNTHESIS BI: CPT | Mod: 26,,, | Performed by: RADIOLOGY

## 2023-03-22 ENCOUNTER — TELEPHONE (OUTPATIENT)
Dept: FAMILY MEDICINE | Facility: CLINIC | Age: 68
End: 2023-03-22
Payer: MEDICARE

## 2023-03-22 NOTE — TELEPHONE ENCOUNTER
----- Message from ASHISH Eaton sent at 3/22/2023 11:08 AM CDT -----  Please notify patient regarding her mammogram test results  No suspicious mass, asymmetry, distortion, or calcification is identified.          IMPRESSION: BENIGN  Right Breast: Negative (BI-RADS 1)  Left Breast: Benign (BI-RADS 2)     Recommendations:  Recommend continued annual screening mammography, according to [employer] guidelines.

## 2023-03-22 NOTE — PROGRESS NOTES
Please notify patient regarding her mammogram test results  No suspicious mass, asymmetry, distortion, or calcification is identified.        IMPRESSION: BENIGN  Right Breast: Negative (BI-RADS 1)  Left Breast: Benign (BI-RADS 2)     Recommendations:  Recommend continued annual screening mammography, according to [employer] guidelines.

## 2023-03-23 ENCOUNTER — TELEPHONE (OUTPATIENT)
Dept: FAMILY MEDICINE | Facility: CLINIC | Age: 68
End: 2023-03-23
Payer: MEDICARE

## 2023-03-24 ENCOUNTER — TELEPHONE (OUTPATIENT)
Dept: FAMILY MEDICINE | Facility: CLINIC | Age: 68
End: 2023-03-24
Payer: MEDICARE

## 2023-03-24 NOTE — TELEPHONE ENCOUNTER
Called patient with results. There was no answer x 3 Left voicemail to call back.     Letter will be mailed.

## 2023-03-27 ENCOUNTER — OUTPATIENT CASE MANAGEMENT (OUTPATIENT)
Dept: ADMINISTRATIVE | Facility: OTHER | Age: 68
End: 2023-03-27
Payer: MEDICARE

## 2023-03-27 DIAGNOSIS — M54.50 CHRONIC BILATERAL LOW BACK PAIN WITHOUT SCIATICA: Primary | ICD-10-CM

## 2023-03-27 DIAGNOSIS — G89.29 CHRONIC BILATERAL LOW BACK PAIN WITHOUT SCIATICA: Primary | ICD-10-CM

## 2023-03-27 DIAGNOSIS — I42.9 CARDIOMYOPATHY, UNSPECIFIED TYPE: ICD-10-CM

## 2023-03-27 DIAGNOSIS — I25.10 CORONARY ARTERY DISEASE INVOLVING NATIVE CORONARY ARTERY OF NATIVE HEART WITHOUT ANGINA PECTORIS: ICD-10-CM

## 2023-03-27 DIAGNOSIS — T82.110A AICD LEAD MALFUNCTION: Chronic | ICD-10-CM

## 2023-03-28 NOTE — PROGRESS NOTES
3/28/2023- 1st attempt to complete follow-up for Initial assessment for Outpatient Care Management: Left message for patient requesting a return call. Will attempt to contact patient again at a later date.     Pt returned call. Discussed participation in OPCM program. Pt stated it is not a good time for her to enroll due to personal family issues. RN contact information provided. Pt states she will contact me with any future needs. Message sent to PCP to inform.  Will close case.

## 2023-06-01 ENCOUNTER — PATIENT OUTREACH (OUTPATIENT)
Dept: ADMINISTRATIVE | Facility: HOSPITAL | Age: 68
End: 2023-06-01
Payer: MEDICARE

## 2023-06-01 NOTE — PROGRESS NOTES
Population Health Outreach. Chart Review.   The following below is/are DUE for Health Maintenance.    Colorectal Cancer Screening.  - Pt referred to [facility] by pcp on 3/8/2023. Schedule pending.

## 2023-09-08 ENCOUNTER — OFFICE VISIT (OUTPATIENT)
Dept: FAMILY MEDICINE | Facility: CLINIC | Age: 68
End: 2023-09-08
Payer: MEDICARE

## 2023-09-08 VITALS
HEIGHT: 72 IN | WEIGHT: 257 LBS | SYSTOLIC BLOOD PRESSURE: 144 MMHG | HEART RATE: 70 BPM | RESPIRATION RATE: 18 BRPM | DIASTOLIC BLOOD PRESSURE: 76 MMHG | BODY MASS INDEX: 34.81 KG/M2 | OXYGEN SATURATION: 100 % | TEMPERATURE: 98 F

## 2023-09-08 DIAGNOSIS — Z12.11 ENCOUNTER FOR COLORECTAL CANCER SCREENING: ICD-10-CM

## 2023-09-08 DIAGNOSIS — Z12.12 ENCOUNTER FOR COLORECTAL CANCER SCREENING: ICD-10-CM

## 2023-09-08 DIAGNOSIS — Z78.0 MENOPAUSE: Primary | ICD-10-CM

## 2023-09-08 PROCEDURE — 99215 OFFICE O/P EST HI 40 MIN: CPT | Mod: PBBFAC | Performed by: NURSE PRACTITIONER

## 2023-09-08 PROCEDURE — 99213 PR OFFICE/OUTPT VISIT, EST, LEVL III, 20-29 MIN: ICD-10-PCS | Mod: S$PBB,,, | Performed by: NURSE PRACTITIONER

## 2023-09-08 PROCEDURE — 99213 OFFICE O/P EST LOW 20 MIN: CPT | Mod: S$PBB,,, | Performed by: NURSE PRACTITIONER

## 2023-09-08 RX ORDER — AMLODIPINE BESYLATE 5 MG/1
5 TABLET ORAL DAILY
COMMUNITY
Start: 2023-07-19

## 2023-09-08 RX ORDER — CALCIFEDIOL 30 UG/1
1 CAPSULE, EXTENDED RELEASE ORAL DAILY
COMMUNITY
Start: 2023-08-18

## 2023-09-08 RX ORDER — BRIMONIDINE TARTRATE 2 MG/ML
1 SOLUTION/ DROPS OPHTHALMIC 2 TIMES DAILY
COMMUNITY
Start: 2023-08-30

## 2023-09-08 RX ORDER — TIMOLOL MALEATE 5 MG/ML
1 SOLUTION/ DROPS OPHTHALMIC 2 TIMES DAILY
COMMUNITY
Start: 2023-05-13

## 2023-09-08 NOTE — PROGRESS NOTES
Patient Name: Jacqueline Miller   : 1955  MRN: [id number]     SUBJECTIVE DATA:    CHIEF COMPLAINT:   Jacqueline Miller is a 68 y.o. female who presents to clinic today with Follow-up        HPI:  68-year-old female presents to the clinic for routine follow-up.  Denies any complaints.    Patient is here to discuss referral to complete DEXA bone scan as well referral to complete colonoscopy.    Patient denies chest pain, shortness of breath, dyspnea on exertion, palpitations, peripheral edema, abdominal pain, nausea, vomiting, diarrhea, constipation, fatigue, fever, chills, dysuria,  hematuria, melena, or hematochezia.       ALLERGIES:   Review of patient's allergies indicates:   Allergen Reactions    Ketorolac      Other reaction(s): Tongue finding  slurred speech    Penicillins Hives    Cholecalciferol (vitamin d3) Edema     Other reaction(s): Hand and joint of hands swelling    Flexeril [cyclobenzaprine]          ROS:  Review of Systems   All other systems reviewed and are negative.        OBJECTIVE DATA:  Vital signs  Vitals:    23 0859 23 0950   BP: (!) 154/82 (!) 144/76   BP Location: Left arm    Patient Position: Sitting    BP Method: X-Large (Manual) X-Large (Manual)   Pulse: 70    Resp: 18    Temp: 97.9 °F (36.6 °C)    TempSrc: Oral    SpO2: 100%    Weight: 116.6 kg (257 lb)    Height: 6' (1.829 m)       Body mass index is 34.86 kg/m².    PHYSICAL EXAM:   Physical Exam  Vitals and nursing note reviewed.   Constitutional:       General: She is awake. She is not in acute distress.     Appearance: Normal appearance. She is well-developed and well-groomed. She is not ill-appearing, toxic-appearing or diaphoretic.   HENT:      Head: Normocephalic and atraumatic.      Right Ear: Tympanic membrane, ear canal and external ear normal.      Left Ear: Tympanic membrane, ear canal and external ear normal.      Nose: Nose normal.      Mouth/Throat:      Mouth: Mucous membranes are moist.      Pharynx: Oropharynx  is clear.   Eyes:      General: Lids are normal.      Extraocular Movements: Extraocular movements intact.      Conjunctiva/sclera: Conjunctivae normal.      Pupils: Pupils are equal, round, and reactive to light.   Cardiovascular:      Rate and Rhythm: Normal rate and regular rhythm.      Pulses: Normal pulses.      Heart sounds: Normal heart sounds. No murmur heard.  Pulmonary:      Effort: Pulmonary effort is normal.      Breath sounds: Normal breath sounds. No wheezing or rhonchi.   Abdominal:      Palpations: Abdomen is soft.   Musculoskeletal:         General: Normal range of motion.      Cervical back: Normal range of motion and neck supple.      Right lower leg: No edema.      Left lower leg: No edema.   Skin:     General: Skin is warm and dry.      Capillary Refill: Capillary refill takes less than 2 seconds.   Neurological:      General: No focal deficit present.      Mental Status: She is alert and oriented to person, place, and time. Mental status is at baseline.      Cranial Nerves: No cranial nerve deficit.      Sensory: No sensory deficit.      Motor: No weakness.      Coordination: Coordination normal.      Gait: Gait normal.   Psychiatric:         Mood and Affect: Mood normal.         Behavior: Behavior normal. Behavior is cooperative.         Thought Content: Thought content normal.         Judgment: Judgment normal.          ASSESSMENT/PLAN:  1. Menopause  -     DXA Bone Density Axial Skeleton 1 or more sites; Future; Expected date: 09/08/2023    2. Encounter for colorectal cancer screening  -     Ambulatory referral/consult to Gastroenterology; Future; Expected date: 09/15/2023           RESULTS:  No results found for this or any previous visit (from the past 1008 hour(s)).      Follow Up:  Follow up in about 6 months (around 3/8/2024).     Face to face time 20 minutes, including documentation, chart review, counseling, education, review of test results, relevant medical records, and coordination  of care.   I have explained the treatment plan, diagnosis, and prognosis to patient. All questions are answered to the best of my knowledge.     Previous medical history/lab work/radiology reviewed and considered during medical management decisions.   Medication list reviewed and medication reconciliation performed.  Patient was provided  and care about his/her current diagnosis (es) and medications including risk/benefit and side effects/adverse events, over the counter medication uses/doses, home self-care and contact precautions,  and red flags and indications for when to seek immediate medical attention.   Patient was advised to continue compliance with current medication list and medical recommendations.  Patient dvised continued compliance with recommended eating habits/ diets for medical conditions and exercise 150 minutes/ week (if possible) for medical condition (s).  Educational handouts and instructions on selected disease management in AVS (After Visit Summary).    All of the patient's questions were answered to patient's satisfaction.   The patient was receptive, expressed verbal understanding and agreement the above plan.      This note was created with the assistance of a voice recognition software or phone dictation. There may be transcription errors as a result of using this technology however minimal. Effort has been made to assure accuracy of transcription but any obvious errors or omissions should be clarified with the author of the document

## 2023-09-15 PROBLEM — Z78.0 MENOPAUSE: Status: ACTIVE | Noted: 2023-09-15

## 2023-09-15 NOTE — PATIENT INSTRUCTIONS
Rodolfo Phillips,     If you are due for any health screening(s) below please notify me so we can arrange them to be ordered and scheduled. Most healthy patients at your age complete them, but you are free to accept or refuse.     If you can't do it, I'll definitely understand. If you can, I'd certainly appreciate it!    Tests to Keep You Healthy    Mammogram: Met on 3/21/2023  Colon Cancer Screening: DUE  Last Blood Pressure <= 139/89 (9/8/2023): NO      Lets manage your high blood pressure     Your blood pressure was above 140/90 today during your visit. We recommend that you schedule a nurse visit in two weeks to check your blood pressure and discuss ways to support your health goals.     You can also manage your health and record your blood pressure from the comfort of home by keeping a daily blood pressure log. These results are shared with and reviewed by your provider. Please print this form (Daily Blood Pressure Log) to assist you in keeping track of your blood pressure at home.     Schedule your nurse visit in two weeks to learn more about how to track and manage high blood pressure.    Daily Blood Pressure Log    Name:__________________________________                  Date of Birth:_________    Average Blood Pressure:  __________      Date: Time  (a.m.) Blood  Pressure: Pulse  Rate: Time  (p.m.) Blood  Pressure : Pulse  Rate:   Sample 8:37 127/83 84

## 2023-09-20 ENCOUNTER — HOSPITAL ENCOUNTER (OUTPATIENT)
Dept: RADIOLOGY | Facility: HOSPITAL | Age: 68
Discharge: HOME | End: 2023-09-20
Attending: NURSE PRACTITIONER
Payer: MEDICARE

## 2023-09-20 DIAGNOSIS — Z78.0 MENOPAUSE: ICD-10-CM

## 2023-09-20 PROCEDURE — 77080 DXA BONE DENSITY AXIAL: CPT | Mod: TC

## 2023-09-25 ENCOUNTER — TELEPHONE (OUTPATIENT)
Dept: FAMILY MEDICINE | Facility: CLINIC | Age: 68
End: 2023-09-25
Payer: MEDICARE

## 2023-09-25 DIAGNOSIS — M85.852 OSTEOPENIA OF NECK OF LEFT FEMUR: Primary | ICD-10-CM

## 2023-09-25 RX ORDER — CALCIUM CARBONATE 600 MG
600 TABLET ORAL 2 TIMES DAILY WITH MEALS
Qty: 180 TABLET | Refills: 3 | Status: SHIPPED | OUTPATIENT
Start: 2023-09-25

## 2023-09-25 NOTE — TELEPHONE ENCOUNTER
Informed patient regarding DEXA bone scan is not the entire body and the appropriate scan has been completed. Instructed patient she is very welcome to schedule an appointment to discuss results, Patient agreed to appointment and was transferred to the ..     ----- Message from ASHISH Eaton sent at 9/25/2023  2:43 PM CDT -----  Please notify patient regarding DEXA bone scan is not the entire body and the appropriate scan has been completed.  Please instruct patient she is very welcome to schedule an appointment to discuss results.  Thanks

## 2023-09-25 NOTE — TELEPHONE ENCOUNTER
Informed patient that the bone density scan showed Left femoral neck osteopenia and that is when the bone is slightly weak than normal.  Recommendation to start on calcium supplementation 600 mg p.o. twice a day.  A prescription has been sent to preferred pharmacy.  Take with food.    Patient states she only had a scan from waist down, she would like to see pictures of scan      ----- Message from ASHISH Eaton sent at 9/25/2023 12:11 PM CDT -----  Please notify patient regarding her bone scan.  Left femoral neck osteopenia and that is when the bone is slightly weak than normal.  Recommendation to start on calcium supplementation 600 mg p.o. twice a day.  A prescription has been sent to preferred pharmacy.  Take with food

## 2023-09-25 NOTE — PROGRESS NOTES
Please notify patient regarding her bone scan.  Left femoral neck osteopenia and that is when the bone is slightly weak than normal.  Recommendation to start on calcium supplementation 600 mg p.o. twice a day.  A prescription has been sent to preferred pharmacy.  Take with food

## 2023-09-25 NOTE — PROGRESS NOTES
Please notify patient regarding DEXA bone scan is not the entire body and the appropriate scan has been completed.  Please instruct patient she is very welcome to schedule an appointment to discuss results.  Thanks

## 2023-09-27 ENCOUNTER — OFFICE VISIT (OUTPATIENT)
Dept: FAMILY MEDICINE | Facility: CLINIC | Age: 68
End: 2023-09-27
Payer: MEDICARE

## 2023-09-27 VITALS
WEIGHT: 262.63 LBS | OXYGEN SATURATION: 100 % | TEMPERATURE: 98 F | BODY MASS INDEX: 35.57 KG/M2 | DIASTOLIC BLOOD PRESSURE: 72 MMHG | HEART RATE: 72 BPM | SYSTOLIC BLOOD PRESSURE: 144 MMHG | HEIGHT: 72 IN

## 2023-09-27 DIAGNOSIS — M89.8X1 PAIN OF LEFT CLAVICLE: Primary | ICD-10-CM

## 2023-09-27 DIAGNOSIS — Z71.2 ENCOUNTER TO DISCUSS TEST RESULTS: ICD-10-CM

## 2023-09-27 PROCEDURE — 99213 PR OFFICE/OUTPT VISIT, EST, LEVL III, 20-29 MIN: ICD-10-PCS | Mod: S$PBB,,, | Performed by: NURSE PRACTITIONER

## 2023-09-27 PROCEDURE — 99215 OFFICE O/P EST HI 40 MIN: CPT | Mod: PBBFAC | Performed by: NURSE PRACTITIONER

## 2023-09-27 PROCEDURE — 99213 OFFICE O/P EST LOW 20 MIN: CPT | Mod: S$PBB,,, | Performed by: NURSE PRACTITIONER

## 2023-09-27 NOTE — PROGRESS NOTES
Patient Name: Jacqueline Miller   : 1955  MRN: [id number]     SUBJECTIVE DATA:    CHIEF COMPLAINT:   Jacqueline Miller is a 68 y.o. female who presents to clinic today with Follow-up (Patient would like to discuss bone density results.)      HPI:  68-year-old female presents to the clinic to discuss DEXA bone scan results.  Patient thought that DEXA bone scan is a test that is done from head to toe.  Patient state she had history cancer . chemotherapy on her brain.  And for that reason she thought she was getting a complete head-to-toe bone scan.    DEXA bone scan: Results explained at bedside .  Imaging results reviewed with patient.  Questions solicited and answered.  Discussed with patient she needs to start her calcium supplementation and to take as directed.  Discussed with patient most of the nutritional supplementation such as milk and cereals they are fortified with calcium and vitamin-D.  Patient state she does consume these items.  Stay hydrated with water. Patient verbalized and agreed to plan.    Clavicle pain:  Patient state she feels pain to the tip of her clavicle notch at sternal end.  Patient denies any recent fall or trauma.  Patient state the pain started after her DEXA bone scan and also she has been lifting on her grandson.  Discussed with patient no crepitus or abnormality on exam.  Possible ligament  or muscle strain.  Ice as needed.  Discussed x-rays if needed, patient declined.  Agreed with patient decision. Instructed patient to avoid picking up on her grand kid since she has a implanted device AICD to her left chest wall.  Patient verbalized.    Medical records requested from Dr. Segundo Moore her cardiologist.  Patient state her blood pressure usually stable.  Patient state she takes her medications as prescribed she does not have any issue with her blood pressure.    Patient denies chest pain, shortness of breath, dyspnea on exertion, palpitations, peripheral edema, abdominal pain, nausea,  vomiting, diarrhea, constipation, fatigue, fever, chills, dysuria,  hematuria, melena, or hematochezia.     ALLERGIES:   Review of patient's allergies indicates:   Allergen Reactions    Ketorolac      Other reaction(s): Tongue finding  slurred speech    Penicillins Hives    Cholecalciferol (vitamin d3) Edema     Other reaction(s): Hand and joint of hands swelling    Flexeril [cyclobenzaprine]          ROS:  Review of Systems   Musculoskeletal:  Positive for joint pain (Left clavicle sternal end tenderness.).   All other systems reviewed and are negative.        OBJECTIVE DATA:  Vital signs  Vitals:    09/27/23 1301 09/27/23 1305 09/27/23 1346   BP: (!) 150/67 (!) 169/82 (!) 144/72   Pulse: 72     Temp: 98.1 °F (36.7 °C)     TempSrc: Oral     SpO2: 100%     Weight: 119.1 kg (262 lb 9.6 oz)     Height: 6' (1.829 m)        Body mass index is 35.61 kg/m².    PHYSICAL EXAM:   Physical Exam  Vitals and nursing note reviewed.   Constitutional:       General: She is awake. She is not in acute distress.     Appearance: Normal appearance. She is well-developed and well-groomed. She is obese. She is not ill-appearing, toxic-appearing or diaphoretic.   HENT:      Head: Normocephalic and atraumatic.      Right Ear: Tympanic membrane, ear canal and external ear normal.      Left Ear: Tympanic membrane, ear canal and external ear normal.      Nose: Nose normal.      Mouth/Throat:      Mouth: Mucous membranes are moist.      Tongue: No lesions. Tongue does not deviate from midline.      Palate: No mass and lesions.      Pharynx: Oropharynx is clear. Uvula midline.   Eyes:      General: Lids are normal.      Extraocular Movements: Extraocular movements intact.      Conjunctiva/sclera: Conjunctivae normal.      Pupils: Pupils are equal, round, and reactive to light.   Neck:      Trachea: Trachea and phonation normal.   Cardiovascular:      Rate and Rhythm: Normal rate. Rhythm irregular.      Pulses: Normal pulses.           Radial  pulses are 2+ on the right side and 2+ on the left side.      Heart sounds: Normal heart sounds.   Pulmonary:      Effort: Pulmonary effort is normal.      Breath sounds: Normal breath sounds and air entry. No wheezing or rhonchi.   Abdominal:      Palpations: Abdomen is soft.      Tenderness: There is no abdominal tenderness.   Musculoskeletal:         General: Normal range of motion.        Arms:       Cervical back: Normal range of motion and neck supple. No signs of trauma, rigidity or crepitus. No pain with movement or muscular tenderness. Normal range of motion.   Lymphadenopathy:      Cervical: No cervical adenopathy.   Skin:     General: Skin is warm and dry.      Capillary Refill: Capillary refill takes less than 2 seconds.   Neurological:      General: No focal deficit present.      Mental Status: She is alert and oriented to person, place, and time. Mental status is at baseline.      GCS: GCS eye subscore is 4. GCS verbal subscore is 5. GCS motor subscore is 6.      Cranial Nerves: No cranial nerve deficit.      Sensory: No sensory deficit.      Motor: No weakness.      Coordination: Coordination normal.      Gait: Gait normal.   Psychiatric:         Attention and Perception: Attention normal.         Mood and Affect: Mood and affect normal.         Speech: Speech normal.         Behavior: Behavior normal. Behavior is cooperative.         Thought Content: Thought content normal.         Cognition and Memory: Cognition normal.         Judgment: Judgment normal.          ASSESSMENT/PLAN:  1. Pain of left clavicle  Assessment & Plan:  Patient state she feels pain to the tip of her left clavicle notch at sternal end.  Patient denies any recent fall or trauma.  Patient state the pain started after her DEXA bone scan and also she has been lifting on her grandson.  Discussed with patient no crepitus or abnormality on exam.  Possible ligament or muscle strain.  Ice as needed.Discussed x-rays if needed, patient  declined.  Agreed with patient decision.   Instructed patient to avoid picking up on her grand kid since she has a implanted device AICD to her left chest wall.  Patient verbalized.      2. Encounter to discuss test results  Assessment & Plan:  68-year-old female presents to the clinic to discuss DEXA bone scan results.  Patient thought that DEXA bone scan is a test that is done from head to toe.  Patient state she had history cancer . chemotherapy on her brain.  And for that reason she thought she was getting a complete head-to-toe bone scan.    DEXA bone scan: Results explained at bedside .  Imaging results reviewed with patient.  Questions solicited and answered.  Discussed with patient she needs to start her calcium supplementation and to take as directed.  Discussed with patient most of the nutritional supplementation such as milk and cereals they are fortified with calcium and vitamin-D.  Patient state she does consume these items.  Stay hydrated with water. Patient verbalized and agreed to plan.             RESULTS:  No results found for this or any previous visit (from the past 1008 hour(s)).      Follow Up:  Keep appointment as scheduled.    Face to face time 20 minutes, including documentation, chart review, counseling, education, review of test results, relevant medical records, and coordination of care.   I have explained the treatment plan, diagnosis, and prognosis to patient. All questions are answered to the best of my knowledge.     Previous medical history/lab work/radiology reviewed and considered during medical management decisions.   Medication list reviewed and medication reconciliation performed.  Patient was provided  and care about his/her current diagnosis (es) and medications including risk/benefit and side effects/adverse events, over the counter medication uses/doses, home self-care and contact precautions,  and red flags and indications for when to seek immediate medical  attention.   Patient was advised to continue compliance with current medication list and medical recommendations.  Patient dvised continued compliance with recommended eating habits/ diets for medical conditions and exercise 150 minutes/ week (if possible) for medical condition (s).  Educational handouts and instructions on selected disease management in AVS (After Visit Summary).    All of the patient's questions were answered to patient's satisfaction.   The patient was receptive, expressed verbal understanding and agreement the above plan.     This note was created with the assistance of a voice recognition software or phone dictation. There may be transcription errors as a result of using this technology however minimal. Effort has been made to assure accuracy of transcription but any obvious errors or omissions should be clarified with the author of the document

## 2023-09-27 NOTE — ASSESSMENT & PLAN NOTE
68-year-old female presents to the clinic to discuss DEXA bone scan results.  Patient thought that DEXA bone scan is a test that is done from head to toe.  Patient state she had history cancer . chemotherapy on her brain.  And for that reason she thought she was getting a complete head-to-toe bone scan.    DEXA bone scan: Results explained at bedside .  Imaging results reviewed with patient.  Questions solicited and answered.  Discussed with patient she needs to start her calcium supplementation and to take as directed.  Discussed with patient most of the nutritional supplementation such as milk and cereals they are fortified with calcium and vitamin-D.  Patient state she does consume these items.  Stay hydrated with water. Patient verbalized and agreed to plan.

## 2023-09-27 NOTE — ASSESSMENT & PLAN NOTE
Patient state she feels pain to the tip of her left clavicle notch at sternal end.  Patient denies any recent fall or trauma.  Patient state the pain started after her DEXA bone scan and also she has been lifting on her grandson.  Discussed with patient no crepitus or abnormality on exam.  Possible ligament or muscle strain.  Ice as needed.Discussed x-rays if needed, patient declined.  Agreed with patient decision.   Instructed patient to avoid picking up on her grand kid since she has a implanted device AICD to her left chest wall.  Patient verbalized.

## 2023-09-27 NOTE — PATIENT INSTRUCTIONS
Rodolfo hPillips,     If you are due for any health screening(s) below please notify me so we can arrange them to be ordered and scheduled. Most healthy patients at your age complete them, but you are free to accept or refuse.     If you can't do it, I'll definitely understand. If you can, I'd certainly appreciate it!    Tests to Keep You Healthy    Mammogram: Met on 3/21/2023  Colon Cancer Screening: DUE  Last Blood Pressure <= 139/89 (9/27/2023): NO      Lets manage your high blood pressure     Your blood pressure was above 140/90 today during your visit. We recommend that you schedule a nurse visit in two weeks to check your blood pressure and discuss ways to support your health goals.     You can also manage your health and record your blood pressure from the comfort of home by keeping a daily blood pressure log. These results are shared with and reviewed by your provider. Please print this form (Daily Blood Pressure Log) to assist you in keeping track of your blood pressure at home.     Schedule your nurse visit in two weeks to learn more about how to track and manage high blood pressure.    Daily Blood Pressure Log    Name:__________________________________                  Date of Birth:_________    Average Blood Pressure:  __________      Date: Time  (a.m.) Blood  Pressure: Pulse  Rate: Time  (p.m.) Blood  Pressure : Pulse  Rate:   Sample 8:37 127/83 84

## 2023-12-11 ENCOUNTER — OFFICE VISIT (OUTPATIENT)
Dept: FAMILY MEDICINE | Facility: CLINIC | Age: 68
End: 2023-12-11
Payer: MEDICARE

## 2023-12-11 VITALS
DIASTOLIC BLOOD PRESSURE: 72 MMHG | HEART RATE: 64 BPM | SYSTOLIC BLOOD PRESSURE: 134 MMHG | WEIGHT: 269 LBS | BODY MASS INDEX: 36.44 KG/M2 | OXYGEN SATURATION: 99 % | RESPIRATION RATE: 18 BRPM | HEIGHT: 72 IN | TEMPERATURE: 98 F

## 2023-12-11 DIAGNOSIS — N62 HYPERTROPHY OF BREAST: Primary | ICD-10-CM

## 2023-12-11 DIAGNOSIS — Z23 ENCOUNTER FOR IMMUNIZATION: ICD-10-CM

## 2023-12-11 PROCEDURE — 90694 VACC AIIV4 NO PRSRV 0.5ML IM: CPT | Mod: PBBFAC

## 2023-12-11 PROCEDURE — 99213 OFFICE O/P EST LOW 20 MIN: CPT | Mod: S$PBB,,, | Performed by: NURSE PRACTITIONER

## 2023-12-11 PROCEDURE — G0008 ADMIN INFLUENZA VIRUS VAC: HCPCS | Mod: PBBFAC

## 2023-12-11 PROCEDURE — 99213 PR OFFICE/OUTPT VISIT, EST, LEVL III, 20-29 MIN: ICD-10-PCS | Mod: S$PBB,,, | Performed by: NURSE PRACTITIONER

## 2023-12-11 PROCEDURE — 99215 OFFICE O/P EST HI 40 MIN: CPT | Mod: PBBFAC,25 | Performed by: NURSE PRACTITIONER

## 2023-12-11 RX ADMIN — INFLUENZA A VIRUS A/VICTORIA/4897/2022 IVR-238 (H1N1) ANTIGEN (FORMALDEHYDE INACTIVATED), INFLUENZA A VIRUS A/DARWIN/6/2021 IVR-227 (H3N2) ANTIGEN (FORMALDEHYDE INACTIVATED), INFLUENZA B VIRUS B/AUSTRIA/1359417/2021 BVR-26 ANTIGEN (FORMALDEHYDE INACTIVATED), INFLUENZA B VIRUS B/PHUKET/3073/2013 BVR-1B ANTIGEN (FORMALDEHYDE INACTIVATED) 0.5 ML: 15; 15; 15; 15 INJECTION, SUSPENSION INTRAMUSCULAR at 02:12

## 2023-12-11 NOTE — ASSESSMENT & PLAN NOTE
Patient state her breast are too large.  It is making her shoulders and upper back hurts.  It keeps her body leaning forward because of breast heaviness.  Discussed with patient will refer her to Dr. Jordi Pizarro Plastic surgery for evaluation.  Patient verbalized and agreed to plan.

## 2023-12-11 NOTE — PROGRESS NOTES
Patient Name: Jacqueline Miller   : 1955  MRN: [id number]     SUBJECTIVE DATA:    CHIEF COMPLAINT:   Jacqueline Miller is a 68 y.o. female who presents to clinic today with Breast Pain        HPI:  68-year-old female presents to the clinic requesting referral for breast reduction to back pain.  past medical history of HTN, AICD implementation s/p VT/VF arrest in , irregular heartbeat, CAD followed by  cardiologist Dr. 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 Henderson, and vitamin D     Breast reduction:  Patient state her breast are too large.  It is making her shoulders and upper back hurts.  It keeps her body leaning forward because of breast heaviness.  Discussed with patient will refer her to Dr. Jordi Pizarro Plastic surgery for evaluation.  Patient verbalized and agreed to plan.    Care gaps:  Patient agreed to receive flu vaccine.    Patient denies chest pain, shortness of breath, dyspnea on exertion, palpitations, peripheral edema, abdominal pain, nausea, vomiting, diarrhea, constipation, fatigue, fever, chills, dysuria,  hematuria, melena, or hematochezia.       ALLERGIES:   Review of patient's allergies indicates:   Allergen Reactions    Ketorolac      Other reaction(s): Tongue finding  slurred speech    Penicillins Hives    Cholecalciferol (vitamin d3) Edema     Other reaction(s): Hand and joint of hands swelling    Flexeril [cyclobenzaprine]          ROS:  Review of Systems   Musculoskeletal:  Positive for back pain (because of large Breasts.).   All other systems reviewed and are negative.        OBJECTIVE DATA:  Vital signs  Vitals:    23 1330   BP: 134/72   Pulse: 64   Resp: 18   Temp: 98.3 °F (36.8 °C)   TempSrc: Oral   SpO2: 99%   Weight: 122 kg (269 lb)   Height: 6'  (1.829 m)      Body mass index is 36.48 kg/m².    PHYSICAL EXAM:   Physical Exam  Vitals and nursing note reviewed.   Constitutional:       General: She is awake.      Appearance: Normal appearance. She is well-developed and well-groomed. She is obese.   HENT:      Head: Normocephalic and atraumatic.      Right Ear: Tympanic membrane, ear canal and external ear normal.      Left Ear: Tympanic membrane, ear canal and external ear normal.      Nose: Nose normal.      Mouth/Throat:      Mouth: Mucous membranes are moist.      Pharynx: Oropharynx is clear.   Eyes:      General: Lids are normal.      Extraocular Movements: Extraocular movements intact.      Conjunctiva/sclera: Conjunctivae normal.      Pupils: Pupils are equal, round, and reactive to light.   Neck:      Trachea: Trachea and phonation normal.   Cardiovascular:      Rate and Rhythm: Normal rate and regular rhythm.      Pulses: Normal pulses.           Radial pulses are 2+ on the right side and 2+ on the left side.      Heart sounds: Normal heart sounds. No murmur heard.  Pulmonary:      Effort: Pulmonary effort is normal.      Breath sounds: Normal breath sounds and air entry. No wheezing or rhonchi.   Abdominal:      Palpations: Abdomen is soft.   Musculoskeletal:         General: Normal range of motion.      Cervical back: Normal range of motion and neck supple.   Lymphadenopathy:      Cervical: No cervical adenopathy.   Skin:     General: Skin is warm and dry.      Capillary Refill: Capillary refill takes less than 2 seconds.   Neurological:      General: No focal deficit present.      Mental Status: She is alert and oriented to person, place, and time. Mental status is at baseline.      GCS: GCS eye subscore is 4. GCS verbal subscore is 5. GCS motor subscore is 6.      Cranial Nerves: No cranial nerve deficit.      Sensory: No sensory deficit.      Motor: No weakness.      Coordination: Coordination normal.      Gait: Gait normal.   Psychiatric:          Attention and Perception: Attention and perception normal.         Mood and Affect: Mood normal.         Speech: Speech normal.         Behavior: Behavior normal. Behavior is cooperative.         Thought Content: Thought content normal.         Cognition and Memory: Cognition normal.         Judgment: Judgment normal.          ASSESSMENT/PLAN:  1. Hypertrophy of breast  Assessment & Plan:  Patient state her breast are too large.  It is making her shoulders and upper back hurts.  It keeps her body leaning forward because of breast heaviness.  Discussed with patient will refer her to Dr. Jordi Pizarro Plastic surgery for evaluation.  Patient verbalized and agreed to plan.    Orders:  -     Ambulatory referral/consult to Plastic Surgery; Future; Expected date: 12/18/2023    2. Encounter for immunization  -     influenza 65up-adj (QUADRIVALENT ADJUVANTED PF) vaccine 0.5 mL           RESULTS:  No results found for this or any previous visit (from the past 1008 hour(s)).      Follow Up:  Follow up in about 3 months (around 3/22/2024).     Face to face time 20 minutes, including documentation, chart review, counseling, education, review of test results, relevant medical records, and coordination of care.   I have explained the treatment plan, diagnosis, and prognosis to patient. All questions are answered to the best of my knowledge.     Previous medical history/lab work/radiology reviewed and considered during medical management decisions.   Medication list reviewed and medication reconciliation performed.  Patient was provided  and care about his/her current diagnosis (es) and medications including risk/benefit and side effects/adverse events, over the counter medication uses/doses, home self-care and contact precautions,  and red flags and indications for when to seek immediate medical attention.   Patient was advised to continue compliance with current medication list and medical recommendations.  Patient dvised  continued compliance with recommended eating habits/ diets for medical conditions and exercise 150 minutes/ week (if possible) for medical condition (s).  Educational handouts and instructions on selected disease management in AVS (After Visit Summary).    All of the patient's questions were answered to patient's satisfaction.   The patient was receptive, expressed verbal understanding and agreement the above plan.       This note was created with the assistance of a voice recognition software or phone dictation. There may be transcription errors as a result of using this technology however minimal. Effort has been made to assure accuracy of transcription but any obvious errors or omissions should be clarified with the author of the document

## 2024-01-30 ENCOUNTER — TELEPHONE (OUTPATIENT)
Dept: FAMILY MEDICINE | Facility: CLINIC | Age: 69
End: 2024-01-30
Payer: MEDICARE

## 2024-02-06 ENCOUNTER — HOSPITAL ENCOUNTER (EMERGENCY)
Facility: HOSPITAL | Age: 69
Discharge: PSYCHIATRIC OR SUBSTANCE USE FACILITY | End: 2024-02-07
Attending: EMERGENCY MEDICINE
Payer: MEDICARE

## 2024-02-06 DIAGNOSIS — Z00.8 MEDICAL CLEARANCE FOR PSYCHIATRIC ADMISSION: ICD-10-CM

## 2024-02-06 LAB
ALBUMIN SERPL-MCNC: 3.8 G/DL (ref 3.4–4.8)
ALBUMIN/GLOB SERPL: 1.3 RATIO (ref 1.1–2)
ALP SERPL-CCNC: 74 UNIT/L (ref 40–150)
ALT SERPL-CCNC: 9 UNIT/L (ref 0–55)
APAP SERPL-MCNC: <17.4 UG/ML (ref 17.4–30)
AST SERPL-CCNC: 14 UNIT/L (ref 5–34)
BASOPHILS # BLD AUTO: 0.06 X10(3)/MCL
BASOPHILS NFR BLD AUTO: 1.3 %
BILIRUB SERPL-MCNC: 0.6 MG/DL
BUN SERPL-MCNC: 16.5 MG/DL (ref 9.8–20.1)
CALCIUM SERPL-MCNC: 8.9 MG/DL (ref 8.4–10.2)
CHLORIDE SERPL-SCNC: 111 MMOL/L (ref 98–107)
CO2 SERPL-SCNC: 25 MMOL/L (ref 23–31)
CREAT SERPL-MCNC: 1.2 MG/DL (ref 0.55–1.02)
EOSINOPHIL # BLD AUTO: 0.2 X10(3)/MCL (ref 0–0.9)
EOSINOPHIL NFR BLD AUTO: 4.3 %
ERYTHROCYTE [DISTWIDTH] IN BLOOD BY AUTOMATED COUNT: 13.2 % (ref 11.5–17)
ETHANOL SERPL-MCNC: <10 MG/DL
GFR SERPLBLD CREATININE-BSD FMLA CKD-EPI: 50 MLS/MIN/1.73/M2
GLOBULIN SER-MCNC: 3 GM/DL (ref 2.4–3.5)
GLUCOSE SERPL-MCNC: 101 MG/DL (ref 82–115)
HCT VFR BLD AUTO: 32.8 % (ref 37–47)
HGB BLD-MCNC: 10.6 G/DL (ref 12–16)
HOLD SPECIMEN: NORMAL
IMM GRANULOCYTES # BLD AUTO: 0.01 X10(3)/MCL (ref 0–0.04)
IMM GRANULOCYTES NFR BLD AUTO: 0.2 %
LYMPHOCYTES # BLD AUTO: 1.11 X10(3)/MCL (ref 0.6–4.6)
LYMPHOCYTES NFR BLD AUTO: 23.7 %
MCH RBC QN AUTO: 28.4 PG (ref 27–31)
MCHC RBC AUTO-ENTMCNC: 32.3 G/DL (ref 33–36)
MCV RBC AUTO: 87.9 FL (ref 80–94)
MONOCYTES # BLD AUTO: 0.38 X10(3)/MCL (ref 0.1–1.3)
MONOCYTES NFR BLD AUTO: 8.1 %
NEUTROPHILS # BLD AUTO: 2.93 X10(3)/MCL (ref 2.1–9.2)
NEUTROPHILS NFR BLD AUTO: 62.4 %
NRBC BLD AUTO-RTO: 0 %
PLATELET # BLD AUTO: 150 X10(3)/MCL (ref 130–400)
PMV BLD AUTO: 10.4 FL (ref 7.4–10.4)
POTASSIUM SERPL-SCNC: 3.8 MMOL/L (ref 3.5–5.1)
PROT SERPL-MCNC: 6.8 GM/DL (ref 5.8–7.6)
RBC # BLD AUTO: 3.73 X10(6)/MCL (ref 4.2–5.4)
SODIUM SERPL-SCNC: 144 MMOL/L (ref 136–145)
TSH SERPL-ACNC: 0.92 UIU/ML (ref 0.35–4.94)
WBC # SPEC AUTO: 4.69 X10(3)/MCL (ref 4.5–11.5)

## 2024-02-06 PROCEDURE — 85025 COMPLETE CBC W/AUTO DIFF WBC: CPT | Performed by: EMERGENCY MEDICINE

## 2024-02-06 PROCEDURE — 99285 EMERGENCY DEPT VISIT HI MDM: CPT | Mod: 25

## 2024-02-06 PROCEDURE — 25000003 PHARM REV CODE 250: Performed by: EMERGENCY MEDICINE

## 2024-02-06 PROCEDURE — 80143 DRUG ASSAY ACETAMINOPHEN: CPT | Performed by: EMERGENCY MEDICINE

## 2024-02-06 PROCEDURE — 93005 ELECTROCARDIOGRAM TRACING: CPT

## 2024-02-06 PROCEDURE — 84443 ASSAY THYROID STIM HORMONE: CPT | Performed by: EMERGENCY MEDICINE

## 2024-02-06 PROCEDURE — 80053 COMPREHEN METABOLIC PANEL: CPT | Performed by: EMERGENCY MEDICINE

## 2024-02-06 PROCEDURE — 82077 ASSAY SPEC XCP UR&BREATH IA: CPT | Performed by: EMERGENCY MEDICINE

## 2024-02-06 RX ORDER — CLONIDINE HYDROCHLORIDE 0.1 MG/1
0.1 TABLET ORAL
Status: COMPLETED | OUTPATIENT
Start: 2024-02-06 | End: 2024-02-06

## 2024-02-06 RX ADMIN — CLONIDINE HYDROCHLORIDE 0.1 MG: 0.1 TABLET ORAL at 11:02

## 2024-02-07 VITALS
DIASTOLIC BLOOD PRESSURE: 84 MMHG | BODY MASS INDEX: 33.14 KG/M2 | TEMPERATURE: 98 F | OXYGEN SATURATION: 94 % | HEART RATE: 67 BPM | RESPIRATION RATE: 18 BRPM | SYSTOLIC BLOOD PRESSURE: 178 MMHG | HEIGHT: 72 IN | WEIGHT: 244.69 LBS

## 2024-02-07 LAB
AMPHET UR QL SCN: NEGATIVE
APPEARANCE UR: CLEAR
BACTERIA #/AREA URNS AUTO: ABNORMAL /HPF
BARBITURATE SCN PRESENT UR: NEGATIVE
BENZODIAZ UR QL SCN: NEGATIVE
BILIRUB UR QL STRIP.AUTO: NEGATIVE
CANNABINOIDS UR QL SCN: NEGATIVE
CASTS URNS MICRO: ABNORMAL /LPF
COCAINE UR QL SCN: NEGATIVE
COLOR UR AUTO: YELLOW
FENTANYL UR QL SCN: NEGATIVE
GLUCOSE UR QL STRIP.AUTO: NORMAL
HYALINE CASTS #/AREA URNS LPF: ABNORMAL /LPF
KETONES UR QL STRIP.AUTO: NEGATIVE
LEUKOCYTE ESTERASE UR QL STRIP.AUTO: NEGATIVE
MDMA UR QL SCN: NEGATIVE
MUCOUS THREADS URNS QL MICRO: ABNORMAL /LPF
NITRITE UR QL STRIP.AUTO: NEGATIVE
OPIATES UR QL SCN: NEGATIVE
PCP UR QL: NEGATIVE
PH UR STRIP.AUTO: 5.5 [PH]
PH UR: 5.5 [PH] (ref 3–11)
PROT UR QL STRIP.AUTO: ABNORMAL
RBC #/AREA URNS AUTO: ABNORMAL /HPF
RBC UR QL AUTO: NEGATIVE
SARS-COV-2 RDRP RESP QL NAA+PROBE: NEGATIVE
SP GR UR STRIP.AUTO: 1.03 (ref 1–1.03)
SPECIFIC GRAVITY, URINE AUTO (.000) (OHS): 1.03 (ref 1–1.03)
SQUAMOUS #/AREA URNS LPF: ABNORMAL /HPF
UROBILINOGEN UR STRIP-ACNC: ABNORMAL
WBC #/AREA URNS AUTO: ABNORMAL /HPF

## 2024-02-07 PROCEDURE — 87635 SARS-COV-2 COVID-19 AMP PRB: CPT | Performed by: EMERGENCY MEDICINE

## 2024-02-07 PROCEDURE — 80307 DRUG TEST PRSMV CHEM ANLYZR: CPT | Performed by: EMERGENCY MEDICINE

## 2024-02-07 PROCEDURE — 81001 URINALYSIS AUTO W/SCOPE: CPT | Mod: XB | Performed by: EMERGENCY MEDICINE

## 2024-02-07 NOTE — ED PROVIDER NOTES
ED PROVIDER NOTE  2/6/2024    CHIEF COMPLAINT:   Chief Complaint   Patient presents with    Psychiatric Evaluation     Auditory hallucinations, paranoia over lasers being pointed at her and poison in the water. Family requested psych eval; patient brought in by PD for voluntary assessment. Hypertensive; did not take night dose of medication.       HISTORY OF PRESENT ILLNESS:   Jacqueline Miller is a 68 y.o. female who presents with chief complaint Mental health evaluation.  Patient brought in by law enforcement with visual hallucinations and paranoid delusions thinks people are coming into her house and trying to poison her.  Daughter who lives with her states this has been going on since around May of last year and has been getting progressively worse, states she has pulled out her pistol threatening to shoot these people that she believes is coming around the house.  Patient has no psychiatric history and denies any recent medication changes.  The daughter has tried to get her to be seen by psych outpatient or even mentioned this to her PCP but patient refuses.  She saw her PCP back in December but would not let the daughter come with her and so the PCP was not aware of these hallucinations and delusions.  Denies headache, next dismissed, fever, dysuria, nausea, vomiting, abdominal pain.    The history is provided by the patient, the police and a relative.         REVIEW OF SYSTEMS: as noted in the HPI.  NURSING NOTES REVIEWED      PAST MEDICAL/SURGICAL HISTORY:   Past Medical History:   Diagnosis Date    SRUTHI positive     CAD (coronary artery disease)     CKD (chronic kidney disease)     Degenerative joint disease     Fibromyalgia     Glomus tympanicum tumor     Heart attack     Hepatitis C     Hypertension     ICD (implantable cardioverter-defibrillator) in place     Left sided sciatica     Noncompliance with medication regimen     Nonischemic cardiomyopathy     Obesity     Seborrheic keratoses     Tinea corporis      Undifferentiated connective tissue disease     Ventricular fibrillation     Vitamin D deficiency       Past Surgical History:   Procedure Laterality Date    CARDIAC DEFIBRILLATOR PLACEMENT      DENTAL SURGERY      all teeth removed    DERMOID CYST  EXCISION      HYSTERECTOMY      INSERTION OF PACEMAKER N/A 2022    Procedure: INSERTION, PACEMAKER;  Surgeon: Annetta Kumar MD;  Location: [facility];  Service: Cardiology;  Laterality: N/A;  AICD Laser Lead Removal & Replacement of [id number] [place]    LEFT HEART CATHETERIZATION         FAMILY HISTORY:   Family History   Problem Relation Age of Onset    Hypertension Mother     Hypertension Father     Kidney failure Father     Diabetes Sister        SOCIAL HISTORY:   Social History     Tobacco Use    Smoking status: Former     Current packs/day: 0.00     Types: Cigarettes     Quit date:      Years since quittin.1    Smokeless tobacco: Never   Substance Use Topics    Alcohol use: Not Currently    Drug use: Never       ALLERGIES:   Review of patient's allergies indicates:   Allergen Reactions    Ketorolac      Other reaction(s): Tongue finding  slurred speech    Penicillins Hives    Cholecalciferol (vitamin d3) Edema     Other reaction(s): Hand and joint of hands swelling    Flexeril [cyclobenzaprine]        PHYSICAL EXAM:  Initial Vitals [24]   BP Pulse Resp Temp SpO2   (!) 229/102 82 14 98.6 °F (37 °C) 100 %      MAP       --         Physical Exam    Nursing note and vitals reviewed.  Constitutional: She appears well-developed and well-nourished.   HENT:   Head: Normocephalic and atraumatic.   Mouth/Throat: Uvula is midline and mucous membranes are normal.   Eyes: EOM are normal. Pupils are equal, round, and reactive to light.   Neck: Trachea normal. Neck supple.   Cardiovascular:  Normal rate, regular rhythm, intact distal pulses and normal pulses.           Pulmonary/Chest: Effort normal and breath sounds normal.   Abdominal:  Abdomen is soft. Bowel sounds are normal. There is no rebound and no guarding.   Musculoskeletal:         General: Normal range of motion.      Cervical back: Neck supple.     Neurological: She is alert and oriented to person, place, and time. GCS eye subscore is 4. GCS verbal subscore is 5. GCS motor subscore is 6.   Skin: Skin is warm and dry.   Psychiatric: She has a normal mood and affect. Her speech is normal and behavior is normal. Thought content is paranoid and delusional. Cognition and memory are normal. She expresses inappropriate judgment.         RESULTS:  Labs Reviewed   COMPREHENSIVE METABOLIC PANEL - Abnormal; Notable for the following components:       Result Value    Chloride 111 (*)     Creatinine 1.20 (*)     All other components within normal limits   ACETAMINOPHEN LEVEL - Abnormal; Notable for the following components:    Acetaminophen Level <17.4 (*)     All other components within normal limits   CBC WITH DIFFERENTIAL - Abnormal; Notable for the following components:    RBC 3.73 (*)     Hgb 10.6 (*)     Hct 32.8 (*)     MCHC 32.3 (*)     All other components within normal limits   TSH - Normal   ALCOHOL,MEDICAL (ETHANOL) - Normal   CBC W/ AUTO DIFFERENTIAL    Narrative:     The following orders were created for panel order CBC auto differential.  Procedure                               Abnormality         Status                     ---------                               -----------         ------                     CBC with Differential[[id number]]       Abnormal            Final result                 Please view results for these tests on the individual orders.   EXTRA TUBES    Narrative:     The following orders were created for panel order EXTRA TUBES.  Procedure                               Abnormality         Status                     ---------                               -----------         ------                     Light Blue Top Hold[[id number]]                              Final result               Gold Top Hold[[id number]]                                   Final result                 Please view results for these tests on the individual orders.   LIGHT BLUE TOP HOLD   GOLD TOP HOLD   EXTRA TUBES    Narrative:     The following orders were created for panel order EXTRA TUBES.  Procedure                               Abnormality         Status                     ---------                               -----------         ------                     Lavender Top Hold[[id number]]                               Final result                 Please view results for these tests on the individual orders.   LAVENDER TOP HOLD   URINALYSIS, REFLEX TO URINE CULTURE   DRUG SCREEN, URINE (BEAKER)   SARS CORONAVIRUS 2 ANTIGEN POCT, MANUAL READ     Imaging Results              CT Head Without Contrast (Preliminary result)  Result time 02/06/24 21:09:52      Preliminary result by Curly Pina Jr., MD (02/06/24 21:09:52)                   Narrative:    START OF REPORT:  Technique: CT of the head was performed without intravenous contrast with axial as well as coronal and sagittal images.    Comparison: None.    Dosage Information: Automated exposure control was utilized.    Clinical history: Psychiatric Evaluation (Auditory hallucinations, paranoia over lasers being pointed at her and poison in the water. Family requested psych eval; patient brought in by PD for voluntary assessment. Hypertensive; did not take night dose of medication.    Findings:  Hemorrhage: No acute intracranial hemorrhage is seen.  CSF spaces: The ventricles, sulci and basal cisterns all appear mildly prominent consistent with global cerebral atrophy.  Brain parenchyma: There is preservation of the grey white junction throughout. Mild microvascular change is seen in portions of the periventricular and deep white matter tracts.  Cerebellum: Unremarkable.  Sella and skull base: The sella appears to be within normal limits  for age.  Herniation: None.  Intracranial calcifications: Incidental note is made of bilateral choroid plexus calcification. Incidental note is made of some pineal region calcification. Incidental note is made of some calcification of the falx.  Calvarium: No acute linear or depressed skull fracture is seen.    Maxillofacial Structures:  Paranasal sinuses: The visualized paranasal sinuses appear clear with no mucoperiosteal thickening or air fluid levels identified.  Orbits: The orbits appear unremarkable.  Zygomatic arches: The zygomatic arches are intact and unremarkable.  Temporal bones and mastoids: There is opacification of the right mastoid air cells and middle ear cavity. This is likely related to otomastoiditis.  TMJ: The mandibular condyles appear normally placed with respect to the mandibular fossa.      Impression:  1. There is opacification of the right mastoid air cells and middle ear cavity. This is likely related to otomastoiditis.  2. No acute intracranial process identified. Details and other findings as noted above.                                         X-Ray Chest AP Portable (Final result)  Result time 02/06/24 21:43:48      Final result by Zachary Boyd MD (02/06/24 21:43:48)                   Impression:      NO ACUTE CARDIOPULMONARY PROCESS IDENTIFIED.      Electronically signed by: Zachary Boyd  Date:    02/06/2024  Time:    21:43               Narrative:    EXAMINATION:  XR CHEST AP PORTABLE    CLINICAL HISTORY:  psychiatric admission;    TECHNIQUE:  One    COMPARISON:  September 8, 2022.    FINDINGS:  Cardiopericardial silhouette enlarged appearance is about similar given the difference in technique.  Cardiac device electrode is in similar location.  Lungs hypoventilatory changes without dense focal or segmental consolidation, overt congestive process, pleural effusions or pneumothorax.                        Wet Read by Malick Zamora DO (02/06/24 21:27:24, [facility] -  Emergency Dept, Emergency Medicine)    Cardiomegaly.  No dense lobar consolidation or pneumothorax.                                    PROCEDURES:  Procedures    ECG:  EKG Readings: (Independently Interpreted)   Initial Reading: No STEMI. Rhythm: Normal Sinus Rhythm. Heart Rate: 71. Ectopy: PVCs. Conduction: Normal. Axis: Normal.       ED COURSE AND MEDICAL DECISION MAKING:  Medications   cloNIDine tablet 0.1 mg (has no administration in time range)     ED Course as of 02/06/24 2215 Tue Feb 06, 2024 2126 Creatinine(!): 1.20  Improved from 1.77 a year ago [IB]   2126 TSH: 0.920 [IB]   2126 Acetaminophen Level(!): <17.4 [IB]   2126 Alcohol, Serum: <10.0 [IB]   2126 WBC: 4.69 [IB]   2126 Hemoglobin(!): 10.6 [IB]   2126 Platelet Count: 150 [IB]      ED Course User Index  [IB] Malick Zamora, DO        Medical Decision Making  68-year-old female presents with law enforcement for mental health evaluation.  She apparently has fixed paranoid delusions with some visual hallucinations that the daughter reports began back in May of last year and have gotten progressively worse.  Patient believes that people are coming around her house trying to poison her and shooting lasers at her and sneaking around her house at night and up in her attic and states that she can hear them talking.  The daughter is concerned because patient has a pistol that she has pulled out threatening to shoot these people that do not exist in the daughter is worried that she may accidentally injured herself or someone else.  The daughter has tried to get her to seek outpatient treatment but patient refuses and we will not let the daughter go to her PCP appointments so her PCP is unaware of this situation.  Given patient's refusal to seek outpatient treatment I will place her under involuntary commitment.  Routine labs obtained for medical clearance.  CT head shows no acute intracranial process.  Labs reviewed and are grossly unremarkable.  Blood  pressure improved without intervention but we will go ahead and give a dose of clonidine so that we can get it further improved so will not be any issues with getting her accepted into a psychiatric facility.  She was medically cleared for psychiatric admission.    Amount and/or Complexity of Data Reviewed  Independent Historian: caregiver and EMS  External Data Reviewed: labs and notes.  Labs: ordered. Decision-making details documented in ED Course.  Radiology: ordered and independent interpretation performed.  ECG/medicine tests: ordered and independent interpretation performed.    Risk  Prescription drug management.  Decision regarding hospitalization.        CLINICAL IMPRESSION:  1. Medical clearance for psychiatric admission        DISPOSITION:   ED Disposition Condition    Transfer to Psych Facility Stable            ED Prescriptions    None       Follow-up Information    None            Malick Zamora,   02/06/24 0157

## 2024-02-08 LAB
OHS QRS DURATION: 86 MS
OHS QTC CALCULATION: 456 MS

## 2024-02-19 ENCOUNTER — TELEPHONE (OUTPATIENT)
Dept: FAMILY MEDICINE | Facility: CLINIC | Age: 69
End: 2024-02-19
Payer: MEDICARE

## 2024-02-19 DIAGNOSIS — F22 PARANOID DELUSION: Primary | ICD-10-CM

## 2024-02-19 NOTE — PROGRESS NOTES
"Patient Name: Jacqueline Miller   : 1955  MRN: [id number]     SUBJECTIVE DATA:    CHIEF COMPLAINT:   Jacqueline Miller is a 68 y.o. female who presents to clinic today with Follow-up        HPI:  68-year-old female presents to the clinic follow-up after discharge from [facility].    Patient is here alone, she drove herself to the clinic.    Mental issues: pt was admitted to [facility] after being evaluated at [facility] on 2024 with diagnosis of paranoid delusion.    Patient was seen and evaluated by Dr. Melinda Daniels.   Patient was Dicharged on 2024.  Patient was prescribed Seroquel 25 mg take half a tablet at 4:00 p.m. and then take 50 mg at bedtime.    Pt state she lives with her daughter.      Patient state the reason she was sent to [facility] because she knows that is someone is hiding in the house attic.  Also she can hear someone talking and also making the noise of" zip tie when you tight together".  Also state the person who is staying in the attic is "controlling the heat to whatever room they need to".  Patient state this past Saturday the police came to her house after her daughter called them.   Patient state even the police was scared to go in the attic to see who was up there in the attic.  Then the patient went on to talk about equipment called" jammers".  Patient has asked me to Google it.  According to Google search" Known as cell jammers, signal blockers, GPS jammers, or text stoppers, a cell phone signal jammer holds up the radio frequency in a given area, creating a sort of signal traffic jam that blocks all communication. Like a radio silence bubble, no calls or texts can be sent or received as long as the user is within range of the cell phone signal block".    Patient state they are using jammer equipments to block her phone call signals..     Spoke to patient's daughter.  State she called the police last Saturday because " she was looking for her gun at home.  Patient state she is living me and my daughter who is 18 and my son who is 21.  Daughter state her mother had taped the smoke detector as well the doors and the vent as well she taped the curtains to the wall to prevent anyone from coming in.  Discussed sending her mother to [facility] for evaluation.  Patient's daughter agreed as well the patient agreed.    09:44 spoke to Serina at [facility] .  10:30 patient accepted at [facility]  11:00 patient's daughter with her son arrived to clinic to  her mother's car .  11:10 [facility] escorted. By [employer].  Transportation services.      ALLERGIES:   Review of patient's allergies indicates:   Allergen Reactions    Ketorolac      Other reaction(s): Tongue finding  slurred speech    Penicillins Hives    Cholecalciferol (vitamin d3) Edema     Other reaction(s): Hand and joint of hands swelling    Flexeril [cyclobenzaprine]          ROS:  Review of Systems   Psychiatric/Behavioral:          Paranoid.   All other systems reviewed and are negative.        OBJECTIVE DATA:  Vital signs  Vitals:    02/26/24 0849   BP: 117/80   Pulse: 65   Resp: 18   Temp: 97.7 °F (36.5 °C)   TempSrc: Oral   SpO2: 100%   Weight: 109.3 kg (241 lb)   Height: 6' (1.829 m)      Body mass index is 32.69 kg/m².    PHYSICAL EXAM:   Physical Exam  Vitals and nursing note reviewed.   Constitutional:       General: She is awake. She is not in acute distress.     Appearance: Normal appearance. She is well-developed and well-groomed. She is obese. She is not ill-appearing, toxic-appearing or diaphoretic.   HENT:      Head: Normocephalic and atraumatic.      Right Ear: Tympanic membrane, ear canal and external ear normal.      Left Ear: Tympanic membrane, ear canal and external ear normal.      Nose: Nose normal.      Mouth/Throat:      Mouth: Mucous membranes are moist.      Pharynx: Oropharynx is clear.    Eyes:      General: Lids are normal.      Extraocular Movements: Extraocular movements intact.      Conjunctiva/sclera: Conjunctivae normal.      Pupils: Pupils are equal, round, and reactive to light.   Cardiovascular:      Rate and Rhythm: Normal rate and regular rhythm.      Pulses: Normal pulses.           Radial pulses are 2+ on the right side and 2+ on the left side.      Heart sounds: Normal heart sounds.   Pulmonary:      Effort: Pulmonary effort is normal.      Breath sounds: Normal breath sounds and air entry. No wheezing or rhonchi.   Abdominal:      Palpations: Abdomen is soft.      Tenderness: There is no abdominal tenderness.   Musculoskeletal:         General: Normal range of motion.      Cervical back: Normal range of motion and neck supple.   Skin:     General: Skin is warm and dry.      Capillary Refill: Capillary refill takes less than 2 seconds.   Neurological:      General: No focal deficit present.      Mental Status: She is alert and oriented to person, place, and time. Mental status is at baseline.      GCS: GCS eye subscore is 4. GCS verbal subscore is 5. GCS motor subscore is 6.      Cranial Nerves: No cranial nerve deficit.      Sensory: No sensory deficit.      Motor: No weakness.      Coordination: Coordination normal.      Gait: Gait normal.   Psychiatric:         Attention and Perception: Attention normal.         Mood and Affect: Mood is anxious. Affect is flat.         Speech: Speech normal.         Behavior: Behavior normal. Behavior is cooperative.         Thought Content: Thought content is paranoid.         Cognition and Memory: Cognition and memory normal.          ASSESSMENT/PLAN:  1. Paranoid delusion  Assessment & Plan:  Discussed sending her mother to [facility] for evaluation.  Patient's daughter agreed as well the patient agreed.    09:44 spoke to Serina at [facility] .  10:30 patient accepted at [facility]  11:00 patient's  daughter with her son arrived to clinic to  her mother's car .  11:10 [facility] escorted. By [employer].  Transportation services.    Orders:  -     Ambulatory referral/consult to Psychiatry; Future; Expected date: 03/04/2024  -     POCT Glucose, Hand-Held Device           RESULTS:  Recent Results (from the past 1008 hour(s))   TSH    Collection Time: 02/06/24  8:20 PM   Result Value Ref Range    TSH 0.920 0.350 - 4.940 uIU/mL   Ethanol    Collection Time: 02/06/24  8:20 PM   Result Value Ref Range    Ethanol Level <10.0 <=10.0 mg/dL   Acetaminophen level    Collection Time: 02/06/24  8:20 PM   Result Value Ref Range    Acetaminophen Level <17.4 (L) 17.4 - 30.0 ug/ml   CBC with Differential    Collection Time: 02/06/24  8:20 PM   Result Value Ref Range    WBC 4.69 4.50 - 11.50 x10(3)/mcL    RBC 3.73 (L) 4.20 - 5.40 x10(6)/mcL    Hgb 10.6 (L) 12.0 - 16.0 g/dL    Hct 32.8 (L) 37.0 - 47.0 %    MCV 87.9 80.0 - 94.0 fL    MCH 28.4 27.0 - 31.0 pg    MCHC 32.3 (L) 33.0 - 36.0 g/dL    RDW 13.2 11.5 - 17.0 %    Platelet 150 130 - 400 x10(3)/mcL    MPV 10.4 7.4 - 10.4 fL    Neut % 62.4 %    Lymph % 23.7 %    Mono % 8.1 %    Eos % 4.3 %    Basophil % 1.3 %    Lymph # 1.11 0.6 - 4.6 x10(3)/mcL    Neut # 2.93 2.1 - 9.2 x10(3)/mcL    Mono # 0.38 0.1 - 1.3 x10(3)/mcL    Eos # 0.20 0 - 0.9 x10(3)/mcL    Baso # 0.06 <=0.2 x10(3)/mcL    IG# 0.01 0 - 0.04 x10(3)/mcL    IG% 0.2 %    NRBC% 0.0 %   Comprehensive metabolic panel    Collection Time: 02/06/24  8:21 PM   Result Value Ref Range    Sodium Level 144 136 - 145 mmol/L    Potassium Level 3.8 3.5 - 5.1 mmol/L    Chloride 111 (H) 98 - 107 mmol/L    Carbon Dioxide 25 23 - 31 mmol/L    Glucose Level 101 82 - 115 mg/dL    Blood Urea Nitrogen 16.5 9.8 - 20.1 mg/dL    Creatinine 1.20 (H) 0.55 - 1.02 mg/dL    Calcium Level Total 8.9 8.4 - 10.2 mg/dL    Protein Total 6.8 5.8 - 7.6 gm/dL    Albumin Level 3.8 3.4 - 4.8 g/dL    Globulin 3.0 2.4 - 3.5 gm/dL    Albumin/Globulin  Ratio 1.3 1.1 - 2.0 ratio    Bilirubin Total 0.6 <=1.5 mg/dL    Alkaline Phosphatase 74 40 - 150 unit/L    Alanine Aminotransferase 9 0 - 55 unit/L    Aspartate Aminotransferase 14 5 - 34 unit/L    eGFR 50 mls/min/1.73/m2   Light Blue Top Hold    Collection Time: 02/06/24  8:26 PM   Result Value Ref Range    Extra Tube Hold for add-ons.    Gold Top Hold    Collection Time: 02/06/24  8:26 PM   Result Value Ref Range    Extra Tube Hold for add-ons.    Lavender Top Hold    Collection Time: 02/06/24  8:28 PM   Result Value Ref Range    Extra Tube Hold for add-ons.    EKG 12-lead    Collection Time: 02/06/24  8:56 PM   Result Value Ref Range    QRS Duration 86 ms    [facility] QTC Calculation 456 ms   COVID-19 Rapid Screening    Collection Time: 02/07/24 12:00 AM   Result Value Ref Range    SARS COV-2 MOLECULAR Negative Negative   Urinalysis, Reflex to Urine Culture    Collection Time: 02/07/24 12:01 AM    Specimen: Urine   Result Value Ref Range    Color, UA Yellow Yellow, Light-Yellow, Dark Yellow, Adriana, Straw    Appearance, UA Clear Clear    Specific Gravity, UA 1.027 1.005 - 1.030    pH, UA 5.5 5.0 - 8.5    Protein, UA 1+ (A) Negative    Glucose, UA Normal Negative, Normal    Ketones, UA Negative Negative    Blood, UA Negative Negative    Bilirubin, UA Negative Negative    Urobilinogen, UA 2+ (A) 0.2, 1.0, Normal    Nitrites, UA Negative Negative    Leukocyte Esterase, UA Negative Negative    WBC, UA 0-5 None Seen, 0-2, 3-5, 0-5 /HPF    Bacteria, UA None Seen None Seen /HPF    Squamous Epithelial Cells, UA Trace (A) None Seen /HPF    Mucous, UA Occ (A) None Seen /LPF    Unclassified Cast, UA 3-5 (A) None Seen /LPF    Hyaline Casts, UA 0-2 (A) None Seen /lpf    RBC, UA 0-5 None Seen, 0-2, 3-5, 0-5 /HPF   Drug Screen, Urine    Collection Time: 02/07/24 12:01 AM   Result Value Ref Range    Amphetamines, Urine Negative Negative    Barbituates, Urine Negative Negative    Benzodiazepine, Urine Negative Negative    Cannabinoids,  Urine Negative Negative    Cocaine, Urine Negative Negative    Fentanyl, Urine Negative Negative    MDMA, Urine Negative Negative    Opiates, Urine Negative Negative    Phencyclidine, Urine Negative Negative    pH, Urine 5.5 3.0 - 11.0    Specific Gravity, Urine Auto 1.027 1.001 - 1.035   POCT Glucose, Hand-Held Device    Collection Time: 02/26/24 10:34 AM   Result Value Ref Range    POC Glucose 105 70 - 110 MG/DL         Follow Up:  Keep appointment for March 8th.    Face to face time 45 minutes, including documentation, chart review, counseling, education, review of test results, relevant medical records, and coordination of care.   I have explained the treatment plan, diagnosis, and prognosis to patient. All questions are answered to the best of my knowledge      Previous medical history/lab work/radiology reviewed and considered during medical management decisions.   Medication list reviewed and medication reconciliation performed.  Patient was provided  and care about his/her current diagnosis (es) and medications including risk/benefit and side effects/adverse events, over the counter medication uses/doses, home self-care and contact precautions,  and red flags and indications for when to seek immediate medical attention.   Patient was advised to continue compliance with current medication list and medical recommendations.  Patient dvised continued compliance with recommended eating habits/ diets for medical conditions and exercise 150 minutes/ week (if possible) for medical condition (s).  Educational handouts and instructions on selected disease management in AVS (After Visit Summary).    All of the patient's questions were answered to patient's satisfaction.   The patient was receptive, expressed verbal understanding and agreement the above plan.        This note was created with the assistance of a voice recognition software or phone dictation. There may be transcription errors as a result of using  this technology however minimal. Effort has been made to assure accuracy of transcription but any obvious errors or omissions should be clarified with the author of the document     yes

## 2024-02-19 NOTE — TELEPHONE ENCOUNTER
Patient her daughter came to the clinic today to schedule follow-up appointment for next Friday.  This was brought to my attention by registration staff.  Patient had already left the clinic.  Call the patient's daughter, daughter id self and id mother's date of birth.  Patient was discharged from [facility] after being PEC'd for visual hallucination and paranoid delusion.  Patient requesting labs to check on her kidneys and liver because she is starting on a new behavioral health medication.  Instructed patients daughter she must have a follow-up appointment with someone in behavioral health for follow-up.  Daughter state she does have a phone number for her to call as soon as possible which she will complete this today.  Instructed daughter of patient to keep the clinic updated.  If symptom worsens to bring her mother to emergency department or call 911.  Continue medication that was prescribed by [facility] provider and to take as directed.  Questions solicited and answered, patient's daughter verbalized.

## 2024-02-26 ENCOUNTER — OFFICE VISIT (OUTPATIENT)
Dept: FAMILY MEDICINE | Facility: CLINIC | Age: 69
End: 2024-02-26
Payer: MEDICARE

## 2024-02-26 VITALS
HEIGHT: 72 IN | RESPIRATION RATE: 18 BRPM | HEART RATE: 65 BPM | WEIGHT: 241 LBS | OXYGEN SATURATION: 100 % | TEMPERATURE: 98 F | SYSTOLIC BLOOD PRESSURE: 117 MMHG | BODY MASS INDEX: 32.64 KG/M2 | DIASTOLIC BLOOD PRESSURE: 80 MMHG

## 2024-02-26 DIAGNOSIS — F22 PARANOID DELUSION: Primary | ICD-10-CM

## 2024-02-26 LAB — GLUCOSE SERPL-MCNC: 105 MG/DL (ref 70–110)

## 2024-02-26 PROCEDURE — 3079F DIAST BP 80-89 MM HG: CPT | Mod: CPTII,,, | Performed by: NURSE PRACTITIONER

## 2024-02-26 PROCEDURE — 3008F BODY MASS INDEX DOCD: CPT | Mod: CPTII,,, | Performed by: NURSE PRACTITIONER

## 2024-02-26 PROCEDURE — 99215 OFFICE O/P EST HI 40 MIN: CPT | Mod: PBBFAC | Performed by: NURSE PRACTITIONER

## 2024-02-26 PROCEDURE — 3288F FALL RISK ASSESSMENT DOCD: CPT | Mod: CPTII,,, | Performed by: NURSE PRACTITIONER

## 2024-02-26 PROCEDURE — 99215 OFFICE O/P EST HI 40 MIN: CPT | Mod: S$PBB,,, | Performed by: NURSE PRACTITIONER

## 2024-02-26 PROCEDURE — 82962 GLUCOSE BLOOD TEST: CPT | Mod: PBBFAC | Performed by: NURSE PRACTITIONER

## 2024-02-26 PROCEDURE — 1160F RVW MEDS BY RX/DR IN RCRD: CPT | Mod: CPTII,,, | Performed by: NURSE PRACTITIONER

## 2024-02-26 PROCEDURE — 1101F PT FALLS ASSESS-DOCD LE1/YR: CPT | Mod: CPTII,,, | Performed by: NURSE PRACTITIONER

## 2024-02-26 PROCEDURE — 1159F MED LIST DOCD IN RCRD: CPT | Mod: CPTII,,, | Performed by: NURSE PRACTITIONER

## 2024-02-26 PROCEDURE — 1126F AMNT PAIN NOTED NONE PRSNT: CPT | Mod: CPTII,,, | Performed by: NURSE PRACTITIONER

## 2024-02-26 PROCEDURE — 3074F SYST BP LT 130 MM HG: CPT | Mod: CPTII,,, | Performed by: NURSE PRACTITIONER

## 2024-02-26 RX ORDER — QUETIAPINE FUMARATE 50 MG/1
50 TABLET, FILM COATED ORAL NIGHTLY
COMMUNITY
Start: 2024-02-16

## 2024-02-27 NOTE — ASSESSMENT & PLAN NOTE
Discussed sending her mother to [facility] for evaluation.  Patient's daughter agreed as well the patient agreed.    09:44 spoke to Serina at [facility] .  10:30 patient accepted at [facility]  11:00 patient's daughter with her son arrived to clinic to  her mother's car .  11:10 [facility] escorted. By [employer].  Transportation services.

## 2025-07-24 ENCOUNTER — HOSPITAL ENCOUNTER (OUTPATIENT)
Facility: HOSPITAL | Age: 70
Discharge: HOME | End: 2025-07-24
Attending: INTERNAL MEDICINE | Admitting: INTERNAL MEDICINE
Payer: MEDICARE

## 2025-07-24 ENCOUNTER — ANESTHESIA EVENT (OUTPATIENT)
Dept: ENDOSCOPY | Facility: HOSPITAL | Age: 70
End: 2025-07-24
Payer: MEDICARE

## 2025-07-24 ENCOUNTER — ANESTHESIA (OUTPATIENT)
Dept: ENDOSCOPY | Facility: HOSPITAL | Age: 70
End: 2025-07-24
Payer: MEDICARE

## 2025-07-24 VITALS
TEMPERATURE: 97 F | SYSTOLIC BLOOD PRESSURE: 152 MMHG | RESPIRATION RATE: 14 BRPM | DIASTOLIC BLOOD PRESSURE: 75 MMHG | OXYGEN SATURATION: 100 % | HEART RATE: 60 BPM

## 2025-07-24 DIAGNOSIS — Z12.11 SCREEN FOR COLON CANCER: ICD-10-CM

## 2025-07-24 PROCEDURE — 88305 TISSUE EXAM BY PATHOLOGIST: CPT

## 2025-07-24 PROCEDURE — 63600175 PHARM REV CODE 636 W HCPCS: Performed by: NURSE ANESTHETIST, CERTIFIED REGISTERED

## 2025-07-24 PROCEDURE — 25000003 PHARM REV CODE 250: Performed by: NURSE ANESTHETIST, CERTIFIED REGISTERED

## 2025-07-24 PROCEDURE — 27201423 OPTIME MED/SURG SUP & DEVICES STERILE SUPPLY: Performed by: INTERNAL MEDICINE

## 2025-07-24 PROCEDURE — 37000009 HC ANESTHESIA EA ADD 15 MINS: Performed by: INTERNAL MEDICINE

## 2025-07-24 PROCEDURE — 45385 COLONOSCOPY W/LESION REMOVAL: CPT | Mod: PT | Performed by: INTERNAL MEDICINE

## 2025-07-24 PROCEDURE — 37000008 HC ANESTHESIA 1ST 15 MINUTES: Performed by: INTERNAL MEDICINE

## 2025-07-24 PROCEDURE — 88305 TISSUE EXAM BY PATHOLOGIST: CPT | Performed by: INTERNAL MEDICINE

## 2025-07-24 RX ORDER — VALSARTAN 80 MG/1
80 TABLET ORAL DAILY
COMMUNITY

## 2025-07-24 RX ORDER — PROPOFOL 10 MG/ML
VIAL (ML) INTRAVENOUS
Status: DISCONTINUED | OUTPATIENT
Start: 2025-07-24 | End: 2025-07-24

## 2025-07-24 RX ORDER — LIDOCAINE HYDROCHLORIDE 20 MG/ML
INJECTION, SOLUTION EPIDURAL; INFILTRATION; INTRACAUDAL; PERINEURAL
Status: DISCONTINUED
Start: 2025-07-24 | End: 2025-07-24 | Stop reason: HOSPADM

## 2025-07-24 RX ORDER — IPRATROPIUM BROMIDE AND ALBUTEROL SULFATE 2.5; .5 MG/3ML; MG/3ML
3 SOLUTION RESPIRATORY (INHALATION)
Status: DISCONTINUED | OUTPATIENT
Start: 2025-07-24 | End: 2025-07-24 | Stop reason: HOSPADM

## 2025-07-24 RX ORDER — ONDANSETRON 4 MG/1
8 TABLET, ORALLY DISINTEGRATING ORAL EVERY 6 HOURS PRN
Status: DISCONTINUED | OUTPATIENT
Start: 2025-07-24 | End: 2025-07-24 | Stop reason: HOSPADM

## 2025-07-24 RX ORDER — PHENYLEPHRINE HCL IN 0.9% NACL 1 MG/10 ML
SYRINGE (ML) INTRAVENOUS
Status: DISCONTINUED
Start: 2025-07-24 | End: 2025-07-24 | Stop reason: HOSPADM

## 2025-07-24 RX ORDER — LIDOCAINE HYDROCHLORIDE 20 MG/ML
INJECTION INTRAVENOUS
Status: DISCONTINUED | OUTPATIENT
Start: 2025-07-24 | End: 2025-07-24

## 2025-07-24 RX ORDER — PROCHLORPERAZINE EDISYLATE 5 MG/ML
5 INJECTION INTRAMUSCULAR; INTRAVENOUS EVERY 30 MIN PRN
Status: DISCONTINUED | OUTPATIENT
Start: 2025-07-24 | End: 2025-07-24 | Stop reason: HOSPADM

## 2025-07-24 RX ORDER — PROPOFOL 10 MG/ML
VIAL (ML) INTRAVENOUS
Status: COMPLETED
Start: 2025-07-24 | End: 2025-07-24

## 2025-07-24 RX ORDER — GLUCAGON 1 MG
1 KIT INJECTION
Status: DISCONTINUED | OUTPATIENT
Start: 2025-07-24 | End: 2025-07-24 | Stop reason: HOSPADM

## 2025-07-24 RX ORDER — SODIUM CHLORIDE, SODIUM GLUCONATE, SODIUM ACETATE, POTASSIUM CHLORIDE AND MAGNESIUM CHLORIDE 30; 37; 368; 526; 502 MG/100ML; MG/100ML; MG/100ML; MG/100ML; MG/100ML
INJECTION, SOLUTION INTRAVENOUS CONTINUOUS
Status: DISCONTINUED | OUTPATIENT
Start: 2025-07-24 | End: 2025-07-24 | Stop reason: HOSPADM

## 2025-07-24 RX ORDER — ONDANSETRON HYDROCHLORIDE 2 MG/ML
4 INJECTION, SOLUTION INTRAVENOUS DAILY PRN
Status: DISCONTINUED | OUTPATIENT
Start: 2025-07-24 | End: 2025-07-24 | Stop reason: HOSPADM

## 2025-07-24 RX ORDER — LIDOCAINE HYDROCHLORIDE 10 MG/ML
1 INJECTION, SOLUTION EPIDURAL; INFILTRATION; INTRACAUDAL; PERINEURAL ONCE
Status: DISCONTINUED | OUTPATIENT
Start: 2025-07-24 | End: 2025-07-24 | Stop reason: HOSPADM

## 2025-07-24 RX ORDER — PROPRANOLOL HYDROCHLORIDE 10 MG/1
10 TABLET ORAL 3 TIMES DAILY
COMMUNITY

## 2025-07-24 RX ADMIN — PROPOFOL 30 MG: 10 INJECTION, EMULSION INTRAVENOUS at 08:07

## 2025-07-24 RX ADMIN — PROPOFOL 70 MG: 10 INJECTION, EMULSION INTRAVENOUS at 08:07

## 2025-07-24 RX ADMIN — PROPOFOL 20 MG: 10 INJECTION, EMULSION INTRAVENOUS at 08:07

## 2025-07-24 RX ADMIN — PROPOFOL 100 MCG/KG/MIN: 10 INJECTION, EMULSION INTRAVENOUS at 08:07

## 2025-07-24 RX ADMIN — PROPOFOL 40 MG: 10 INJECTION, EMULSION INTRAVENOUS at 08:07

## 2025-07-24 RX ADMIN — SODIUM CHLORIDE, SODIUM GLUCONATE, SODIUM ACETATE, POTASSIUM CHLORIDE AND MAGNESIUM CHLORIDE: 526; 502; 368; 37; 30 INJECTION, SOLUTION INTRAVENOUS at 08:07

## 2025-07-24 RX ADMIN — LIDOCAINE HYDROCHLORIDE 100 MG: 20 INJECTION INTRAVENOUS at 08:07

## 2025-07-24 NOTE — PROVATION PATIENT INSTRUCTIONS
Discharge Summary/Instructions after an Endoscopic Procedure  Patient Name: Jacqueline Miller  Patient MRN: [id number]  Patient YOB: 1955  Thursday, July 24, 2025  Sami Young MD  Dear patient,  As a result of recent federal legislation (The Federal Cures Act), you may   receive lab or pathology results from your procedure in your [facility]   account before your physician is able to contact you. Your physician or   their representative will relay the results to you with their   recommendations at their soonest availability.  Thank you,  RESTRICTIONS:  During your procedure today, you received medications for sedation.  These   medications may affect your judgment, balance and coordination.  Therefore,   for 24 hours, you have the following restrictions:   - DO NOT drive a car, operate machinery, make legal/financial decisions,   sign important papers or drink alcohol.    ACTIVITY:  Today: no heavy lifting, straining or running due to procedural   sedation/anesthesia.  The following day: return to full activity including work.  DIET:  Eat and drink normally unless instructed otherwise.     TREATMENT FOR COMMON SIDE EFFECTS:  - Mild abdominal pain, nausea, belching, bloating or excessive gas:  rest,   eat lightly and use a heating pad.  - Sore Throat: treat with throat lozenges and/or gargle with warm salt   water.  - Because air was used during the procedure, expelling large amounts of air   from your rectum or belching is normal.  - If a bowel prep was taken, you may not have a bowel movement for 1-3 days.    This is normal.  SYMPTOMS TO WATCH FOR AND REPORT TO YOUR PHYSICIAN:  1. Abdominal pain or bloating, other than gas cramps.  2. Chest pain.  3. Back pain.  4. Signs of infection such as: chills or fever occurring within 24 hours   after the procedure.  5. Rectal bleeding, which would show as bright red, maroon, or black stools.   (A tablespoon of blood from the rectum is not serious, especially if    hemorrhoids are present.)  6. Vomiting.  7. Weakness or dizziness.  GO DIRECTLY TO THE NEAREST EMERGENCY ROOM IF YOU HAVE ANY OF THE FOLLOWING:      Difficulty breathing              Chills and/or fever over 101 F   Persistent vomiting and/or vomiting blood   Severe abdominal pain   Severe chest pain   Black, tarry stools   Bleeding- more than one tablespoon   Any other symptom or condition that you feel may need urgent attention  Your doctor recommends these additional instructions:  If any biopsies were taken, your doctors clinic will contact you in 1 to 2   weeks with any results.  Recommendations:  - Discharge patient to home (ambulatory).   - Patient has a contact number available for emergencies.  The signs and   symptoms of potential delayed complications were discussed with the   patient.  Return to normal activities tomorrow.  Written discharge   instructions were provided to the patient.   - Resume previous diet.   - Continue present medications.   - Return to primary care physician as previously scheduled.   - Repeat colonoscopy in 5 years for surveillance.   - Discharge patient to home (via cart).   - Resume previous diet.   - Continue present medications.  Impressions:  - Hemorrhoids found on perianal exam.   - The examined portion of the ileum was normal.   - Two 3 to 4 mm polyps in the rectum and at the ileocecal valve, removed   with a cold snare.  Resected and retrieved.   - Diverticulosis in the sigmoid colon.  For questions, problems or results please call your physician - Sami Young MD at Work:  [phone].  [facility] at [phone]  IF A COMPLICATION OR EMERGENCY SITUATION ARISES AND YOU ARE UNABLE TO REACH   YOUR PHYSICIAN - GO DIRECTLY TO THE EMERGENCY ROOM.  MD Sami Roman MD  7/24/2025 9:02:26 AM  This report has been verified and signed electronically.  Dear patient,  As a result of recent federal legislation (The Federal  Cures Act), you may   receive lab or pathology results from your procedure in your [facility]   account before your physician is able to contact you. Your physician or   their representative will relay the results to you with their   recommendations at their soonest availability.  Thank you,  PROVATION

## 2025-07-24 NOTE — ANESTHESIA POSTPROCEDURE EVALUATION
Anesthesia Post Evaluation    Patient: Jacqueline Miller    Procedure(s) Performed: Procedure(s) (LRB):  COLON (N/A)    Final Anesthesia Type: general      Patient location during evaluation: GI PACU  Patient participation: Yes- Able to Participate  Level of consciousness: awake and alert  Post-procedure vital signs: reviewed and stable  Pain management: adequate  Airway patency: patent    PONV status at discharge: No PONV  Anesthetic complications: no      Cardiovascular status: blood pressure returned to baseline  Respiratory status: spontaneous ventilation and room air  Hydration status: euvolemic  Follow-up not needed.              Vitals Value Taken Time   /75 07/24/25 09:20   Temp 36.1 °C (97 °F) 07/24/25 09:00   Pulse 60 07/24/25 09:20   Resp 14 07/24/25 09:20   SpO2 100 % 07/24/25 09:20         No case tracking events are documented in the log.      Pain/Esteban Score: Esteban Score: 10 (7/24/2025  9:21 AM)

## 2025-07-24 NOTE — ANESTHESIA PREPROCEDURE EVALUATION
07/24/2025  Jacqueline Miller is a 70 y.o., female with a history of V-tach/VFib arrest (2015) status post AICD and replacement in 2022, hepatitis-C, fibromyalgia presents as an outpatient for colonoscopy screening.  Patient tolerated general anesthesia for AICD removal in 2022 with phenylephrine drip (see below).  Patient denies any discharge from defibrillator no assessment of left ventricular function available but patient describes adequate functional capacity        Last 3 sets of Vitals        2/6/2024     7:14 PM 2/26/2024     8:49 AM 7/24/2025     7:52 AM   Vitals - 1 value per visit   SYSTOLIC 229 117 178   DIASTOLIC 102 80 59   Pulse 82 65 72   Temp 37 °C (98.6 °F) 36.5 °C (97.7 °F) 36.3 °C (97.3 °F)   Resp 14 18 22   SPO2 100 % 100 % 100 %   Weight (lb) 244.71 241    Weight (kg) 111 109.317    Height 6' (1.829 m) 6' (1.829 m)    BMI (Calculated) 33.2 32.7    Pain Score  Zero        Lab Results   Component Value Date    WBC 4.4 01/30/2025    HGB 11.8 01/30/2025    HCT 36.5 01/30/2025    MCV 87.9 02/06/2024     01/30/2025   Pre-op Assessment    I have reviewed the Patient Summary Reports.    I have reviewed the NPO Status.   I have reviewed the Medications.     Review of Systems  Anesthesia Hx:               Denies Personal Hx of Anesthesia complications.                    Social:  Non-Smoker       Cardiovascular:     Hypertension                  Functional Capacity good / => 4 METS                     Disorder of Cardiac Rhythm, Ventricular Tachycardia, Ventricular Fibrillation     Renal/:  Chronic Renal Disease                Hepatic/GI:       Hepatitis, C                  Physical Exam  General: Well nourished, Cooperative, Alert and Oriented    Airway:  Mallampati: II   Mouth Opening: Normal  TM Distance: Normal  Tongue: Normal  Neck ROM: Normal  ROM    Dental:  Dentures    Chest/Lungs:  Clear to auscultation, Normal Respiratory Rate    Heart:  Rate: Normal  Rhythm: Regular Rhythm        Anesthesia Plan  Type of Anesthesia, risks & benefits discussed:    Anesthesia Type: Gen Natural Airway  Intra-op Monitoring Plan: Standard ASA Monitors  Induction:  IV  Informed Consent: Informed consent signed with the Patient and all parties understand the risks and agree with anesthesia plan.  All questions answered.   ASA Score: 4  Day of Surgery Review of History & Physical: H&P Update referred to the surgeon/provider.    Ready For Surgery From Anesthesia Perspective.     .

## 2025-07-24 NOTE — H&P
Gastroenterology Consultation Note    Reason for Consult:  The encounter diagnosis was Screen for colon cancer.    PCP:   Jayden Wang Jr.    Referring MD:  Jayden Wang Jr.  [address] [place] [address]    Hospital Day: 0     Initial History of Present Illness (HPI):  This is a 70 y.o. female patient here for screening colonoscopy.  She believes her mother or father may have had polyps.  She denies any symptoms of bleeding diarrhea abdominal pain weight loss or other alarm symptoms.  She tolerated the prep ready for the procedure    ROS:  Review of Systems   All other systems reviewed and are negative.      Medical History:   Past Medical History:   Diagnosis Date    SRUTHI positive     CAD (coronary artery disease)     CKD (chronic kidney disease)     Degenerative joint disease     Fibromyalgia     Glomus tympanicum tumor     Heart attack     Hepatitis C     Hypertension     ICD (implantable cardioverter-defibrillator) in place     Left sided sciatica     Noncompliance with medication regimen     Nonischemic cardiomyopathy     Obesity     Seborrheic keratoses     Tinea corporis     Undifferentiated connective tissue disease     Ventricular fibrillation     Vitamin D deficiency        Surgical History:   Past Surgical History:   Procedure Laterality Date    CARDIAC DEFIBRILLATOR PLACEMENT      DENTAL SURGERY      all teeth removed    DERMOID CYST  EXCISION      HYSTERECTOMY      INSERTION OF PACEMAKER N/A 9/12/2022    Procedure: INSERTION, PACEMAKER;  Surgeon: Annetta Kumar MD;  Location: [facility] OR;  Service: Cardiology;  Laterality: N/A;  AICD Laser Lead Removal & Replacement of TEFW-Edcrctitm-Dcvfrv Noah    LEFT HEART CATHETERIZATION         Family History:   Family History   Problem Relation Name Age of Onset    Hypertension Mother      Hypertension Father      Kidney failure Father      Diabetes Sister     .     Social History:   Social History     Tobacco Use    Smoking status:  Former     Current packs/day: 0.00     Types: Cigarettes     Quit date:      Years since quittin.5    Smokeless tobacco: Never   Substance Use Topics    Alcohol use: Not Currently       Allergies:  Review of patient's allergies indicates:   Allergen Reactions    Ketorolac      Other reaction(s): Tongue finding  slurred speech    Penicillins Hives    Cholecalciferol (vitamin d3) Edema     Other reaction(s): Hand and joint of hands swelling    Flexeril [cyclobenzaprine]        Prescriptions Prior to Admission[1]      Objective Findings:    Vital Signs:  BP (!) 152/75 (BP Location: Right arm, Patient Position: Lying)   Pulse 60   Temp 97 °F (36.1 °C) (Tympanic)   Resp 14   SpO2 100%   Breastfeeding No   There is no height or weight on file to calculate BMI.    Physical Exam:  Physical Exam  Vitals reviewed.   Constitutional:       Appearance: Normal appearance.   HENT:      Head: Normocephalic and atraumatic.      Nose: Nose normal.      Mouth/Throat:      Mouth: Mucous membranes are moist.   Cardiovascular:      Rate and Rhythm: Normal rate and regular rhythm.   Pulmonary:      Effort: Pulmonary effort is normal.      Breath sounds: Normal breath sounds.   Abdominal:      General: Abdomen is flat.      Palpations: Abdomen is soft.   Musculoskeletal:         General: Normal range of motion.      Cervical back: Normal range of motion.   Skin:     General: Skin is warm.   Neurological:      General: No focal deficit present.      Mental Status: She is alert.   Psychiatric:         Mood and Affect: Mood normal.         Labs:  No results found for this or any previous visit (from the past 48 hours).    No orders to display       Imaging:    Endoscopy:        Assessment/Plan:  Proceed with colon    Thank you for allowing us to participate in the care of Jacqueline Miller.    Sami Young MD       [1]   No medications prior to admission.

## 2025-07-24 NOTE — TRANSFER OF CARE
Anesthesia Transfer of Care Note    Patient: Jacqueline Miller    Procedure(s) Performed: Procedure(s) (LRB):  COLON (N/A)    Patient location: PACU    Anesthesia Type: general    Transport from OR: Transported from OR on room air with adequate spontaneous ventilation    Post pain: adequate analgesia    Post assessment: no apparent anesthetic complications    Post vital signs: stable    Level of consciousness: sedated, awake and responds to stimulation    Nausea/Vomiting: no nausea/vomiting    Complications: none    Transfer of care protocol was followed      Last vitals: Visit Vitals  BP (!) 178/59 (BP Location: Left arm, Patient Position: Sitting)   Pulse 72   Temp 36.3 °C (97.3 °F) (Tympanic)   Resp (!) 22   SpO2 100%   Breastfeeding No

## 2025-07-25 LAB — PSYCHE PATHOLOGY RESULT: NORMAL

## (undated) DEVICE — STOPCOCK 4-WAY

## (undated) DEVICE — SYRINGE 0.9% NACL 10MIL PREFIL

## (undated) DEVICE — BOWL STERILE LARGE 32OZ

## (undated) DEVICE — LOCKING DEVICE LEAD #1

## (undated) DEVICE — KIT BRIDGE ACCESSORY

## (undated) DEVICE — Device

## (undated) DEVICE — DRESSING TELFA + BARR 4X6IN

## (undated) DEVICE — NDL HYPO REG 25G X 1 1/2

## (undated) DEVICE — TRAY CATH FOL SIL URIMTR 16FR

## (undated) DEVICE — COVER C-ARM STRAP BAND 44X80IN

## (undated) DEVICE — ELECTRODE PATIENT RETURN DISP

## (undated) DEVICE — SUT MONOCRYL PLUS UD 3-0 27

## (undated) DEVICE — KIT CATHGARD DBL LUMN 9FRX11.5

## (undated) DEVICE — GLOVE PROTEXIS NEOPRN SZ8

## (undated) DEVICE — DRAPE STERI INCISE 23X23IN

## (undated) DEVICE — SEE MEDLINE ITEM 159606

## (undated) DEVICE — SUT VICRYL 2-0 27 CT-1

## (undated) DEVICE — SNARE CAPTIFLEX 2.4X27MM 240CM

## (undated) DEVICE — KIT SURGICAL TURNOVER

## (undated) DEVICE — DRAPE C-ARM COVER EZ 36X28IN

## (undated) DEVICE — GLOVE PROTEXIS LTX MICRO  7

## (undated) DEVICE — KIT SHEATH GLIDELIGHT LSR 14FR

## (undated) DEVICE — CABLE EKG DL RBBN 3 LEAD DISP

## (undated) DEVICE — SHEATH TIGHTRAL SUB-C 13FR

## (undated) DEVICE — KIT SHEATH GLIDELIGHT LSR 16FR

## (undated) DEVICE — GLOVE PROTEXIS LTX MICRO  7.5

## (undated) DEVICE — TIP SUCTION YANKAUER

## (undated) DEVICE — COVER PROBE US 5.5X58L NON LTX

## (undated) DEVICE — SUT 2/0 30IN SILK BLK BRAI

## (undated) DEVICE — KIT LEAD LOCKING DEVICE ACC

## (undated) DEVICE — LINER MEDI-VAC PPV FLTR 1500CC

## (undated) DEVICE — KIT SURGICAL COLON .25 1.1OZ

## (undated) DEVICE — GLOVE PROTEXIS HYDROGEL SZ6.5

## (undated) DEVICE — SOL NACL IRR 1000ML BTL

## (undated) DEVICE — CUTTER LEAD

## (undated) DEVICE — BANDAGE ADHESIVE FABRIC 2X4

## (undated) DEVICE — KIT WRENCH

## (undated) DEVICE — ELECTRODE PROPAD MULTI W/10FT

## (undated) DEVICE — GLOVE PROTEXIS BLUE LATEX 7

## (undated) DEVICE — INTRODUCER CATH 5F 11CM

## (undated) DEVICE — SUT SILK BLK BR. 2 2-60

## (undated) DEVICE — SOL IRRI STRL WATER 1000ML

## (undated) DEVICE — SHEATH SAFESHEATH 9FRX25CM

## (undated) DEVICE — DRESSING TRANS 4X4 TEGADERM

## (undated) DEVICE — TRAP ETRAP POLYP 50 TRAY

## (undated) DEVICE — SUT VICRYL 2-0 36 CT-1

## (undated) DEVICE — MANIFOLD 4 PORT

## (undated) DEVICE — NDL HYPO 22GX1 1/2 SYR 10ML LL